# Patient Record
Sex: FEMALE | Race: WHITE | Employment: FULL TIME | URBAN - NONMETROPOLITAN AREA
[De-identification: names, ages, dates, MRNs, and addresses within clinical notes are randomized per-mention and may not be internally consistent; named-entity substitution may affect disease eponyms.]

---

## 2017-10-03 ENCOUNTER — HOSPITAL ENCOUNTER (OUTPATIENT)
Age: 42
Discharge: HOME | End: 2017-10-03
Payer: COMMERCIAL

## 2017-10-03 LAB
ALBUMIN SERPL-MCNC: 4.3 G/DL (ref 3.5–5.1)
ALT SERPL-CCNC: 15 U/L (ref 11–66)
ANISOCYTOSIS: SLIGHT
BASOPHILS # BLD: 0.3 %
BASOPHILS ABSOLUTE: 0 THOU/MM3 (ref 0–0.1)
CREAT SERPL-MCNC: 0.6 MG/DL (ref 0.4–1.2)
EOSINOPHIL # BLD: 1 %
EOSINOPHILS ABSOLUTE: 0.1 THOU/MM3 (ref 0–0.4)
GFR SERPL CREATININE-BSD FRML MDRD: > 90 ML/MIN/1.73M2
HCT VFR BLD CALC: 38.7 % (ref 37–47)
HEMOGLOBIN: 13.1 GM/DL (ref 12–16)
LYMPHOCYTES # BLD: 27.8 %
LYMPHOCYTES ABSOLUTE: 3.4 THOU/MM3 (ref 1–4.8)
MCH RBC QN AUTO: 29 PG (ref 27–31)
MCHC RBC AUTO-ENTMCNC: 33.9 GM/DL (ref 33–37)
MCV RBC AUTO: 85.4 FL (ref 81–99)
MONOCYTES # BLD: 8.3 %
MONOCYTES ABSOLUTE: 1 THOU/MM3 (ref 0.4–1.3)
NUCLEATED RED BLOOD CELLS: 0 /100 WBC
PDW BLD-RTO: 14.6 % (ref 11.5–14.5)
PLATELET # BLD: 351 THOU/MM3 (ref 130–400)
PMV BLD AUTO: 8.2 MCM (ref 7.4–10.4)
RBC # BLD: 4.53 MILL/MM3 (ref 4.2–5.4)
RBC # BLD: NORMAL 10*6/UL
SEDIMENTATION RATE, ERYTHROCYTE: 15 MM/HR (ref 0–20)
SEG NEUTROPHILS: 62.6 %
SEGMENTED NEUTROPHILS ABSOLUTE COUNT: 7.7 THOU/MM3 (ref 1.8–7.7)
WBC # BLD: 12.3 THOU/MM3 (ref 4.8–10.8)

## 2017-10-03 PROCEDURE — 85025 COMPLETE CBC W/AUTO DIFF WBC: CPT

## 2017-10-03 PROCEDURE — 36415 COLL VENOUS BLD VENIPUNCTURE: CPT

## 2017-10-03 PROCEDURE — 82040 ASSAY OF SERUM ALBUMIN: CPT

## 2017-10-03 PROCEDURE — 84460 ALANINE AMINO (ALT) (SGPT): CPT

## 2017-10-03 PROCEDURE — 82565 ASSAY OF CREATININE: CPT

## 2017-10-03 PROCEDURE — 85651 RBC SED RATE NONAUTOMATED: CPT

## 2018-02-01 ENCOUNTER — HOSPITAL ENCOUNTER (OUTPATIENT)
Age: 43
Discharge: HOME | End: 2018-02-01
Payer: COMMERCIAL

## 2018-02-01 LAB
ALBUMIN SERPL-MCNC: 4.4 G/DL (ref 3.5–5.1)
ALT SERPL-CCNC: 16 U/L (ref 11–66)
ANISOCYTOSIS: ABNORMAL
BASOPHILS # BLD: 0.5 %
BASOPHILS ABSOLUTE: 0 THOU/MM3 (ref 0–0.1)
CREAT SERPL-MCNC: 0.5 MG/DL (ref 0.4–1.2)
EOSINOPHIL # BLD: 1.5 %
EOSINOPHILS ABSOLUTE: 0.1 THOU/MM3 (ref 0–0.4)
GFR SERPL CREATININE-BSD FRML MDRD: > 90 ML/MIN/1.73M2
HCT VFR BLD CALC: 39.1 % (ref 37–47)
HEMOGLOBIN: 13.1 GM/DL (ref 12–16)
LYMPHOCYTES # BLD: 37.9 %
LYMPHOCYTES ABSOLUTE: 3.7 THOU/MM3 (ref 1–4.8)
MCH RBC QN AUTO: 28.6 PG (ref 27–31)
MCHC RBC AUTO-ENTMCNC: 33.6 GM/DL (ref 33–37)
MCV RBC AUTO: 85.3 FL (ref 81–99)
MONOCYTES # BLD: 7 %
MONOCYTES ABSOLUTE: 0.7 THOU/MM3 (ref 0.4–1.3)
NUCLEATED RED BLOOD CELLS: 0 /100 WBC
PDW BLD-RTO: 14.9 % (ref 11.5–14.5)
PLATELET # BLD: 350 THOU/MM3 (ref 130–400)
PMV BLD AUTO: 8.3 FL (ref 7.4–10.4)
RBC # BLD: 4.59 MILL/MM3 (ref 4.2–5.4)
SEDIMENTATION RATE, ERYTHROCYTE: 16 MM/HR (ref 0–20)
SEG NEUTROPHILS: 53.1 %
SEGMENTED NEUTROPHILS ABSOLUTE COUNT: 5.2 THOU/MM3 (ref 1.8–7.7)
WBC # BLD: 9.8 THOU/MM3 (ref 4.8–10.8)

## 2018-02-01 PROCEDURE — 36415 COLL VENOUS BLD VENIPUNCTURE: CPT

## 2018-02-01 PROCEDURE — 85025 COMPLETE CBC W/AUTO DIFF WBC: CPT

## 2018-02-01 PROCEDURE — 84460 ALANINE AMINO (ALT) (SGPT): CPT

## 2018-02-01 PROCEDURE — 85651 RBC SED RATE NONAUTOMATED: CPT

## 2018-02-01 PROCEDURE — 82040 ASSAY OF SERUM ALBUMIN: CPT

## 2018-02-01 PROCEDURE — 82565 ASSAY OF CREATININE: CPT

## 2018-04-23 ENCOUNTER — HOSPITAL ENCOUNTER (OUTPATIENT)
Age: 43
Discharge: HOME | End: 2018-04-23
Payer: COMMERCIAL

## 2018-04-23 LAB
ALBUMIN SERPL-MCNC: 4.4 G/DL (ref 3.5–5.1)
ALT SERPL-CCNC: 18 U/L (ref 11–66)
ANISOCYTOSIS: ABNORMAL
BASOPHILS # BLD: 0.3 %
BASOPHILS ABSOLUTE: 0 THOU/MM3 (ref 0–0.1)
CREAT SERPL-MCNC: 0.5 MG/DL (ref 0.4–1.2)
EOSINOPHIL # BLD: 1.2 %
EOSINOPHILS ABSOLUTE: 0.1 THOU/MM3 (ref 0–0.4)
GFR SERPL CREATININE-BSD FRML MDRD: > 90 ML/MIN/1.73M2
HCT VFR BLD CALC: 37.8 % (ref 37–47)
HEMOGLOBIN: 13.1 GM/DL (ref 12–16)
LYMPHOCYTES # BLD: 36 %
LYMPHOCYTES ABSOLUTE: 3.8 THOU/MM3 (ref 1–4.8)
MCH RBC QN AUTO: 29.4 PG (ref 27–31)
MCHC RBC AUTO-ENTMCNC: 34.7 GM/DL (ref 33–37)
MCV RBC AUTO: 84.7 FL (ref 81–99)
MONOCYTES # BLD: 7.7 %
MONOCYTES ABSOLUTE: 0.8 THOU/MM3 (ref 0.4–1.3)
NUCLEATED RED BLOOD CELLS: 0 /100 WBC
PDW BLD-RTO: 15 % (ref 11.5–14.5)
PLATELET # BLD: 380 THOU/MM3 (ref 130–400)
PMV BLD AUTO: 7.7 FL (ref 7.4–10.4)
RBC # BLD: 4.47 MILL/MM3 (ref 4.2–5.4)
SEDIMENTATION RATE, ERYTHROCYTE: 14 MM/HR (ref 0–20)
SEG NEUTROPHILS: 54.8 %
SEGMENTED NEUTROPHILS ABSOLUTE COUNT: 5.8 THOU/MM3 (ref 1.8–7.7)
WBC # BLD: 10.5 THOU/MM3 (ref 4.8–10.8)

## 2018-04-23 PROCEDURE — 36415 COLL VENOUS BLD VENIPUNCTURE: CPT

## 2018-04-23 PROCEDURE — 82040 ASSAY OF SERUM ALBUMIN: CPT

## 2018-04-23 PROCEDURE — 85025 COMPLETE CBC W/AUTO DIFF WBC: CPT

## 2018-04-23 PROCEDURE — 85651 RBC SED RATE NONAUTOMATED: CPT

## 2018-04-23 PROCEDURE — 82565 ASSAY OF CREATININE: CPT

## 2018-04-23 PROCEDURE — 84460 ALANINE AMINO (ALT) (SGPT): CPT

## 2018-06-06 ENCOUNTER — HOSPITAL ENCOUNTER (OUTPATIENT)
Age: 43
Discharge: HOME | End: 2018-06-06
Payer: COMMERCIAL

## 2018-06-06 LAB
HBV SURFACE AB TITR SER: POSITIVE {TITER}
HEPATITIS B SURFACE ANTIGEN: NEGATIVE
HEPATITIS C ANTIBODY: NEGATIVE

## 2018-06-06 PROCEDURE — 86803 HEPATITIS C AB TEST: CPT

## 2018-06-06 PROCEDURE — 87340 HEPATITIS B SURFACE AG IA: CPT

## 2018-06-06 PROCEDURE — 86706 HEP B SURFACE ANTIBODY: CPT

## 2018-06-06 PROCEDURE — 86480 TB TEST CELL IMMUN MEASURE: CPT

## 2018-06-06 PROCEDURE — 86704 HEP B CORE ANTIBODY TOTAL: CPT

## 2018-06-06 PROCEDURE — 36415 COLL VENOUS BLD VENIPUNCTURE: CPT

## 2018-06-09 LAB
HEPATITIS B CORE TOTAL ANTIBODY: NEGATIVE
QUANTIFERON (R) TB GOLD (INCUBATED): NEGATIVE
QUANTIFERON MITOGEN MINUS NIL: > 10 IU/ML
QUANTIFERON NIL: 0.03 IU/ML
QUANTIFERON TB AG MINUS NIL: 0 IU/ML (ref 0–0.34)

## 2018-07-09 ENCOUNTER — OFFICE VISIT (OUTPATIENT)
Dept: PULMONOLOGY | Age: 43
End: 2018-07-09
Payer: COMMERCIAL

## 2018-07-09 VITALS
BODY MASS INDEX: 53.92 KG/M2 | WEIGHT: 293 LBS | RESPIRATION RATE: 20 BRPM | DIASTOLIC BLOOD PRESSURE: 82 MMHG | SYSTOLIC BLOOD PRESSURE: 122 MMHG | HEART RATE: 71 BPM | HEIGHT: 62 IN | OXYGEN SATURATION: 95 %

## 2018-07-09 DIAGNOSIS — Z99.89 OBSTRUCTIVE SLEEP APNEA ON CPAP: Primary | ICD-10-CM

## 2018-07-09 DIAGNOSIS — G47.33 OBSTRUCTIVE SLEEP APNEA ON CPAP: Primary | ICD-10-CM

## 2018-07-09 PROCEDURE — 99213 OFFICE O/P EST LOW 20 MIN: CPT | Performed by: PHYSICIAN ASSISTANT

## 2018-07-09 PROCEDURE — 1036F TOBACCO NON-USER: CPT | Performed by: PHYSICIAN ASSISTANT

## 2018-07-09 PROCEDURE — G8419 CALC BMI OUT NRM PARAM NOF/U: HCPCS | Performed by: PHYSICIAN ASSISTANT

## 2018-07-09 PROCEDURE — G8427 DOCREV CUR MEDS BY ELIG CLIN: HCPCS | Performed by: PHYSICIAN ASSISTANT

## 2018-07-09 ASSESSMENT — ENCOUNTER SYMPTOMS
SHORTNESS OF BREATH: 0
ORTHOPNEA: 0
SINUS PAIN: 0
SORE THROAT: 0
NAUSEA: 0
RESPIRATORY NEGATIVE: 1
HEARTBURN: 0
WHEEZING: 0
EYES NEGATIVE: 1
COUGH: 0
SPUTUM PRODUCTION: 0
GASTROINTESTINAL NEGATIVE: 1

## 2018-07-09 NOTE — PROGRESS NOTES
25 mg by mouth every 6 hours as needed for Itching      Cetirizine HCl (ZYRTEC ALLERGY PO) Take by mouth      loratadine (CLARITIN) 10 MG tablet Take 10 mg by mouth daily.  Cholecalciferol (VITAMIN D) 2000 UNITS CAPS capsule Take  by mouth.  Ascorbic Acid (VITAMIN C) 500 MG CAPS Take  by mouth.  Omega-3 Fatty Acids (OMEGA 3 PO) Take  by mouth 2 times daily.  Multiple Vitamins-Minerals (MULTIVITAL-M PO) Take  by mouth.  acetaminophen (TYLENOL) 325 MG tablet Take 650 mg by mouth every 6 hours as needed.  lisinopril-hydrochlorothiazide (PRINZIDE;ZESTORETIC) 10-12.5 MG per tablet Take 1 tablet by mouth daily.  folic acid (FOLVITE) 1 MG tablet Take 1 mg by mouth daily.  methotrexate, PF, (RHEUMATREX) 25 MG/ML chemo injection Inject 15 mg into the muscle once a week.  metformin (GLUCOPHAGE-XR) 750 MG XR tablet Take 500 mg by mouth daily (with breakfast)       sertraline (ZOLOFT) 100 MG tablet Take 100 mg by mouth daily.  InFLIXimab (REMICADE IV) Infuse 4 vials intravenously. Every 6 weeks       No current facility-administered medications for this visit. Family History   Problem Relation Age of Onset   Alessio Martinez Cancer Mother     Arthritis Father         Review of Systems -   Review of Systems   Constitutional: Negative. Negative for chills and fever. HENT: Negative. Negative for congestion, nosebleeds, sinus pain and sore throat. Eyes: Negative. Respiratory: Negative. Negative for cough, sputum production, shortness of breath and wheezing. Cardiovascular: Negative. Negative for chest pain, orthopnea and PND. Gastrointestinal: Negative. Negative for heartburn and nausea. Genitourinary: Negative. Musculoskeletal: Negative. Negative for joint pain and myalgias. Skin: Negative. Neurological: Negative. Negative for dizziness, weakness and headaches. Endo/Heme/Allergies: Negative. Psychiatric/Behavioral: Negative. earlier appointment if needed for worsening of sleep symptoms.   - She was instructed on weight loss  - Mathieuminesh Jamblayne was educated about my impression and plan. Patient verbalizes understanding.   We will see Angel Terrell back in: 1 year with download    Mamta Vance PA-C, MPAS  7/9/2018

## 2018-07-25 ENCOUNTER — HOSPITAL ENCOUNTER (OUTPATIENT)
Age: 43
Discharge: HOME | End: 2018-07-25
Payer: COMMERCIAL

## 2018-07-25 LAB
ALBUMIN SERPL-MCNC: 4 G/DL (ref 3.5–5.1)
ALT SERPL-CCNC: 15 U/L (ref 11–66)
BASOPHILS # BLD: 0.3 %
BASOPHILS ABSOLUTE: 0 THOU/MM3 (ref 0–0.1)
CREAT SERPL-MCNC: 0.7 MG/DL (ref 0.4–1.2)
EOSINOPHIL # BLD: 1.5 %
EOSINOPHILS ABSOLUTE: 0.1 THOU/MM3 (ref 0–0.4)
ERYTHROCYTE [DISTWIDTH] IN BLOOD BY AUTOMATED COUNT: 13.7 % (ref 11.5–14.5)
ERYTHROCYTE [DISTWIDTH] IN BLOOD BY AUTOMATED COUNT: 43.8 FL (ref 35–45)
GFR SERPL CREATININE-BSD FRML MDRD: > 90 ML/MIN/1.73M2
HCT VFR BLD CALC: 38.7 % (ref 37–47)
HEMOGLOBIN: 13 GM/DL (ref 12–16)
IMMATURE GRANS (ABS): 0.02 THOU/MM3 (ref 0–0.07)
IMMATURE GRANULOCYTES: 0.2 %
LYMPHOCYTES # BLD: 35.7 %
LYMPHOCYTES ABSOLUTE: 3.5 THOU/MM3 (ref 1–4.8)
MCH RBC QN AUTO: 29.5 PG (ref 26–33)
MCHC RBC AUTO-ENTMCNC: 33.6 GM/DL (ref 32.2–35.5)
MCV RBC AUTO: 88 FL (ref 81–99)
MONOCYTES # BLD: 5.6 %
MONOCYTES ABSOLUTE: 0.5 THOU/MM3 (ref 0.4–1.3)
NUCLEATED RED BLOOD CELLS: 0 /100 WBC
PLATELET # BLD: 312 THOU/MM3 (ref 130–400)
PMV BLD AUTO: 9.8 FL (ref 9.4–12.4)
RBC # BLD: 4.4 MILL/MM3 (ref 4.2–5.4)
SEDIMENTATION RATE, ERYTHROCYTE: 17 MM/HR (ref 0–20)
SEG NEUTROPHILS: 56.7 %
SEGMENTED NEUTROPHILS ABSOLUTE COUNT: 5.5 THOU/MM3 (ref 1.8–7.7)
WBC # BLD: 9.7 THOU/MM3 (ref 4.8–10.8)

## 2018-07-25 PROCEDURE — 36415 COLL VENOUS BLD VENIPUNCTURE: CPT

## 2018-07-25 PROCEDURE — 84460 ALANINE AMINO (ALT) (SGPT): CPT

## 2018-07-25 PROCEDURE — 85651 RBC SED RATE NONAUTOMATED: CPT

## 2018-07-25 PROCEDURE — 82565 ASSAY OF CREATININE: CPT

## 2018-07-25 PROCEDURE — 85025 COMPLETE CBC W/AUTO DIFF WBC: CPT

## 2018-07-25 PROCEDURE — 82040 ASSAY OF SERUM ALBUMIN: CPT

## 2018-09-27 ENCOUNTER — HOSPITAL ENCOUNTER (OUTPATIENT)
Age: 43
Discharge: HOME | End: 2018-09-27
Payer: COMMERCIAL

## 2018-09-27 LAB
ALBUMIN SERPL-MCNC: 4.4 G/DL (ref 3.5–5.1)
ALT SERPL-CCNC: 17 U/L (ref 11–66)
BASOPHILS # BLD: 0.3 %
BASOPHILS ABSOLUTE: 0 THOU/MM3 (ref 0–0.1)
CREAT SERPL-MCNC: 0.5 MG/DL (ref 0.4–1.2)
EOSINOPHIL # BLD: 1.1 %
EOSINOPHILS ABSOLUTE: 0.1 THOU/MM3 (ref 0–0.4)
ERYTHROCYTE [DISTWIDTH] IN BLOOD BY AUTOMATED COUNT: 13.4 % (ref 11.5–14.5)
ERYTHROCYTE [DISTWIDTH] IN BLOOD BY AUTOMATED COUNT: 41.3 FL (ref 35–45)
GFR SERPL CREATININE-BSD FRML MDRD: > 90 ML/MIN/1.73M2
HCT VFR BLD CALC: 38.5 % (ref 37–47)
HEMOGLOBIN: 13.3 GM/DL (ref 12–16)
IMMATURE GRANS (ABS): 0.04 THOU/MM3 (ref 0–0.07)
IMMATURE GRANULOCYTES: 0.3 %
LYMPHOCYTES # BLD: 28.3 %
LYMPHOCYTES ABSOLUTE: 3.4 THOU/MM3 (ref 1–4.8)
MCH RBC QN AUTO: 29.6 PG (ref 26–33)
MCHC RBC AUTO-ENTMCNC: 34.5 GM/DL (ref 32.2–35.5)
MCV RBC AUTO: 85.6 FL (ref 81–99)
MONOCYTES # BLD: 6.4 %
MONOCYTES ABSOLUTE: 0.8 THOU/MM3 (ref 0.4–1.3)
NUCLEATED RED BLOOD CELLS: 0 /100 WBC
PLATELET # BLD: 372 THOU/MM3 (ref 130–400)
PMV BLD AUTO: 9.2 FL (ref 9.4–12.4)
RBC # BLD: 4.5 MILL/MM3 (ref 4.2–5.4)
SEDIMENTATION RATE, ERYTHROCYTE: 20 MM/HR (ref 0–20)
SEG NEUTROPHILS: 63.6 %
SEGMENTED NEUTROPHILS ABSOLUTE COUNT: 7.6 THOU/MM3 (ref 1.8–7.7)
WBC # BLD: 12 THOU/MM3 (ref 4.8–10.8)

## 2018-09-27 PROCEDURE — 85651 RBC SED RATE NONAUTOMATED: CPT

## 2018-09-27 PROCEDURE — 85025 COMPLETE CBC W/AUTO DIFF WBC: CPT

## 2018-09-27 PROCEDURE — 36415 COLL VENOUS BLD VENIPUNCTURE: CPT

## 2018-09-27 PROCEDURE — 82040 ASSAY OF SERUM ALBUMIN: CPT

## 2018-09-27 PROCEDURE — 82565 ASSAY OF CREATININE: CPT

## 2018-09-27 PROCEDURE — 84460 ALANINE AMINO (ALT) (SGPT): CPT

## 2018-11-28 ENCOUNTER — OFFICE VISIT (OUTPATIENT)
Dept: FAMILY MEDICINE CLINIC | Age: 43
End: 2018-11-28
Payer: COMMERCIAL

## 2018-11-28 VITALS
HEART RATE: 84 BPM | RESPIRATION RATE: 14 BRPM | DIASTOLIC BLOOD PRESSURE: 64 MMHG | WEIGHT: 293 LBS | BODY MASS INDEX: 44.41 KG/M2 | HEIGHT: 68 IN | OXYGEN SATURATION: 98 % | SYSTOLIC BLOOD PRESSURE: 118 MMHG | TEMPERATURE: 98.2 F

## 2018-11-28 DIAGNOSIS — F32.89 OTHER DEPRESSION: ICD-10-CM

## 2018-11-28 DIAGNOSIS — Z99.89 OBSTRUCTIVE SLEEP APNEA ON CPAP: Primary | ICD-10-CM

## 2018-11-28 DIAGNOSIS — E88.81 INSULIN RESISTANCE: ICD-10-CM

## 2018-11-28 DIAGNOSIS — E66.01 MORBID OBESITY WITH BODY MASS INDEX OF 40.0-44.9 IN ADULT (HCC): ICD-10-CM

## 2018-11-28 DIAGNOSIS — I10 HYPERTENSION, UNSPECIFIED TYPE: ICD-10-CM

## 2018-11-28 DIAGNOSIS — G47.33 OBSTRUCTIVE SLEEP APNEA ON CPAP: Primary | ICD-10-CM

## 2018-11-28 DIAGNOSIS — E28.2 PCOS (POLYCYSTIC OVARIAN SYNDROME): ICD-10-CM

## 2018-11-28 PROCEDURE — G8484 FLU IMMUNIZE NO ADMIN: HCPCS | Performed by: NURSE PRACTITIONER

## 2018-11-28 PROCEDURE — G8427 DOCREV CUR MEDS BY ELIG CLIN: HCPCS | Performed by: NURSE PRACTITIONER

## 2018-11-28 PROCEDURE — G8417 CALC BMI ABV UP PARAM F/U: HCPCS | Performed by: NURSE PRACTITIONER

## 2018-11-28 PROCEDURE — 1036F TOBACCO NON-USER: CPT | Performed by: NURSE PRACTITIONER

## 2018-11-28 PROCEDURE — 99203 OFFICE O/P NEW LOW 30 MIN: CPT | Performed by: NURSE PRACTITIONER

## 2018-11-28 RX ORDER — MELOXICAM 15 MG/1
15 TABLET ORAL DAILY
COMMUNITY
End: 2019-05-28 | Stop reason: ALTCHOICE

## 2018-11-28 RX ORDER — METFORMIN HYDROCHLORIDE 500 MG/1
500 TABLET, EXTENDED RELEASE ORAL
Qty: 90 TABLET | Refills: 3 | Status: SHIPPED | OUTPATIENT
Start: 2018-11-28 | End: 2019-11-26 | Stop reason: SDUPTHER

## 2018-11-28 RX ORDER — LORATADINE 10 MG/1
10 TABLET ORAL DAILY
Qty: 90 TABLET | Refills: 3 | Status: SHIPPED | OUTPATIENT
Start: 2018-11-28 | End: 2019-11-26 | Stop reason: SDUPTHER

## 2018-11-28 RX ORDER — SERTRALINE HYDROCHLORIDE 100 MG/1
100 TABLET, FILM COATED ORAL DAILY
Qty: 90 TABLET | Refills: 3 | Status: SHIPPED | OUTPATIENT
Start: 2018-11-28 | End: 2019-11-26 | Stop reason: SDUPTHER

## 2018-11-28 RX ORDER — LISINOPRIL AND HYDROCHLOROTHIAZIDE 12.5; 1 MG/1; MG/1
1 TABLET ORAL DAILY
Qty: 90 TABLET | Refills: 3 | Status: SHIPPED | OUTPATIENT
Start: 2018-11-28 | End: 2019-11-26 | Stop reason: SDUPTHER

## 2018-11-28 RX ORDER — MELATONIN
1 DAILY
COMMUNITY

## 2018-11-28 RX ORDER — METFORMIN HYDROCHLORIDE 500 MG/1
500 TABLET, EXTENDED RELEASE ORAL
COMMUNITY
End: 2018-11-28 | Stop reason: SDUPTHER

## 2018-11-28 ASSESSMENT — PATIENT HEALTH QUESTIONNAIRE - PHQ9
SUM OF ALL RESPONSES TO PHQ QUESTIONS 1-9: 0
SUM OF ALL RESPONSES TO PHQ9 QUESTIONS 1 & 2: 0
1. LITTLE INTEREST OR PLEASURE IN DOING THINGS: 0
SUM OF ALL RESPONSES TO PHQ QUESTIONS 1-9: 0
2. FEELING DOWN, DEPRESSED OR HOPELESS: 0

## 2018-11-28 ASSESSMENT — ENCOUNTER SYMPTOMS
SINUS PRESSURE: 0
DIARRHEA: 0
BLOOD IN STOOL: 0
SHORTNESS OF BREATH: 0
RHINORRHEA: 0
WHEEZING: 0
EYE ITCHING: 0
VOMITING: 0
EYE REDNESS: 0
CONSTIPATION: 0
COUGH: 1
NAUSEA: 0
EYE PAIN: 0
SINUS PAIN: 0

## 2018-11-28 NOTE — PROGRESS NOTES
prescribed medications. All patient questions answered. Pt voiced understanding. Reviewed health maintenance.        Electronically signed THU Castro - CNP on 11/28/2018 at 5:08 PM

## 2018-11-28 NOTE — PATIENT INSTRUCTIONS
Take part in a Mosque activity or other social gathering. Go to a [employer] game. · Ask a friend to have dinner with you. Take care of yourself  · Eat a balanced diet with plenty of fresh fruits and vegetables, whole grains, and lean protein. If you have lost your appetite, eat small snacks rather than large meals. · Avoid drinking alcohol or using illegal drugs. Do not take medicines that have not been prescribed for you. They may interfere with medicines you may be taking for depression, or they may make your depression worse. · Take your medicines exactly as they are prescribed. You may start to feel better within 1 to 3 weeks of taking antidepressant medicine. But it can take as many as 6 to 8 weeks to see more improvement. If you have questions or concerns about your medicines, or if you do not notice any improvement by 3 weeks, talk to your doctor. · If you have any side effects from your medicine, tell your doctor. Antidepressants can make you feel tired, dizzy, or nervous. Some people have dry mouth, constipation, headaches, sexual problems, or diarrhea. Many of these side effects are mild and will go away on their own after you have been taking the medicine for a few weeks. Some may last longer. Talk to your doctor if side effects are bothering you too much. You might be able to try a different medicine. · Get enough sleep. If you have problems sleeping:  ? Go to bed at the same time every night, and get up at the same time every morning. ? Keep your bedroom dark and quiet. ? Do not exercise after 5:00 p.m.  ? Avoid drinks with caffeine after 5:00 p.m. · Avoid sleeping pills unless they are prescribed by the doctor treating your depression. Sleeping pills may make you groggy during the day, and they may interact with other medicine you are taking. · If you have any other illnesses, such as diabetes, heart disease, or high blood pressure, make sure to continue with your treatment.  Tell your doctor about all of the medicines you take, including those with or without a prescription. · Keep the numbers for these national suicide hotlines: 2-340-174-TALK (8-415.652.6058) and 8-670-XNEXHPH (2-195.561.7556). If you or someone you know talks about suicide or feeling hopeless, get help right away. When should you call for help? Call 911 anytime you think you may need emergency care. For example, call if:    · You feel like hurting yourself or someone else.     · Someone you know has depression and is about to attempt or is attempting suicide.   [facility] your doctor now or seek immediate medical care if:    · You hear voices.     · Someone you know has depression and:  ? Starts to give away his or her possessions. ? Uses illegal drugs or drinks alcohol heavily. ? Talks or writes about death, including writing suicide notes or talking about guns, knives, or pills. ? Starts to spend a lot of time alone. ? Acts very aggressively or suddenly appears calm.    Watch closely for changes in your health, and be sure to contact your doctor if:    · You do not get better as expected. Where can you learn more? Go to https://Brisk.io.Loginza. org and sign in to your [employer] account. Enter I021 in the [facility] box to learn more about \"Recovering From Depression: Care Instructions. \"     If you do not have an account, please click on the \"Sign Up Now\" link. Current as of: December 7, 2017  Content Version: 11.8  © 1591-4881 Healthwise, [employer]. Care instructions adapted under license by [facility] [place] ([facility]). If you have questions about a medical condition or this instruction, always ask your healthcare professional. [place] [address] any warranty or liability for your use of this information. Patient Education        DASH Diet: Care Instructions  Your Care Instructions    The DASH diet is an eating plan that can help lower your blood pressure.  DASH stands for Dietary Approaches to medicines. Take your medicine exactly as prescribed. Call your doctor if you think you are having a problem with your medicine. · Talk to your doctor before you start taking aspirin every day. Aspirin can help certain people lower their risk of a heart attack or stroke. But taking aspirin isn't right for everyone, because it can cause serious bleeding. · See your doctor regularly. You may need to see the doctor more often at first or until your blood pressure comes down. · If you are taking blood pressure medicine, talk to your doctor before you take decongestants or anti-inflammatory medicine, such as ibuprofen. Some of these medicines can raise blood pressure. · Learn how to check your blood pressure at home. Lifestyle changes  · Stay at a healthy weight. This is especially important if you put on weight around the waist. Losing even 10 pounds can help you lower your blood pressure. · If your doctor recommends it, get more exercise. Walking is a good choice. Bit by bit, increase the amount you walk every day. Try for at least 30 minutes on most days of the week. You also may want to swim, bike, or do other activities. · Avoid or limit alcohol. Talk to your doctor about whether you can drink any alcohol. · Try to limit how much sodium you eat to less than 2,300 milligrams (mg) a day. Your doctor may ask you to try to eat less than 1,500 mg a day. · Eat plenty of fruits (such as bananas and oranges), vegetables, legumes, whole grains, and low-fat dairy products. · Lower the amount of saturated fat in your diet. Saturated fat is found in animal products such as milk, cheese, and meat. Limiting these foods may help you lose weight and also lower your risk for heart disease. · Do not smoke. Smoking increases your risk for heart attack and stroke. If you need help quitting, talk to your doctor about stop-smoking programs and medicines. These can increase your chances of quitting for good.   When should you

## 2018-12-18 ENCOUNTER — HOSPITAL ENCOUNTER (OUTPATIENT)
Age: 43
Discharge: HOME | End: 2018-12-18
Payer: COMMERCIAL

## 2018-12-18 DIAGNOSIS — Z99.89 OBSTRUCTIVE SLEEP APNEA ON CPAP: ICD-10-CM

## 2018-12-18 DIAGNOSIS — E66.01 MORBID OBESITY WITH BODY MASS INDEX OF 40.0-44.9 IN ADULT (HCC): ICD-10-CM

## 2018-12-18 DIAGNOSIS — G47.33 OBSTRUCTIVE SLEEP APNEA ON CPAP: ICD-10-CM

## 2018-12-18 DIAGNOSIS — I10 HYPERTENSION, UNSPECIFIED TYPE: ICD-10-CM

## 2018-12-18 LAB
ALBUMIN SERPL-MCNC: 4 G/DL (ref 3.5–5.1)
ALP BLD-CCNC: 74 U/L (ref 38–126)
ALT SERPL-CCNC: 15 U/L (ref 11–66)
ANION GAP SERPL CALCULATED.3IONS-SCNC: 13 MEQ/L (ref 8–16)
AST SERPL-CCNC: 14 U/L (ref 5–40)
BASOPHILS # BLD: 0.3 %
BASOPHILS ABSOLUTE: 0 THOU/MM3 (ref 0–0.1)
BILIRUB SERPL-MCNC: 0.2 MG/DL (ref 0.3–1.2)
BUN BLDV-MCNC: 15 MG/DL (ref 7–22)
CALCIUM SERPL-MCNC: 9.2 MG/DL (ref 8.5–10.5)
CHLORIDE BLD-SCNC: 97 MEQ/L (ref 98–111)
CHOLESTEROL, TOTAL: 150 MG/DL (ref 100–199)
CO2: 26 MEQ/L (ref 23–33)
CREAT SERPL-MCNC: 0.6 MG/DL (ref 0.4–1.2)
EOSINOPHIL # BLD: 1.2 %
EOSINOPHILS ABSOLUTE: 0.1 THOU/MM3 (ref 0–0.4)
ERYTHROCYTE [DISTWIDTH] IN BLOOD BY AUTOMATED COUNT: 14.3 % (ref 11.5–14.5)
ERYTHROCYTE [DISTWIDTH] IN BLOOD BY AUTOMATED COUNT: 44.3 FL (ref 35–45)
GFR SERPL CREATININE-BSD FRML MDRD: > 90 ML/MIN/1.73M2
GLUCOSE BLD-MCNC: 122 MG/DL (ref 70–108)
HCT VFR BLD CALC: 38.5 % (ref 37–47)
HDLC SERPL-MCNC: 32 MG/DL
HEMOGLOBIN: 12.8 GM/DL (ref 12–16)
IMMATURE GRANS (ABS): 0.04 THOU/MM3 (ref 0–0.07)
IMMATURE GRANULOCYTES: 0.3 %
LDL CHOLESTEROL CALCULATED: 67 MG/DL
LYMPHOCYTES # BLD: 25.1 %
LYMPHOCYTES ABSOLUTE: 2.9 THOU/MM3 (ref 1–4.8)
MCH RBC QN AUTO: 28.8 PG (ref 26–33)
MCHC RBC AUTO-ENTMCNC: 33.2 GM/DL (ref 32.2–35.5)
MCV RBC AUTO: 86.7 FL (ref 81–99)
MONOCYTES # BLD: 7.6 %
MONOCYTES ABSOLUTE: 0.9 THOU/MM3 (ref 0.4–1.3)
NUCLEATED RED BLOOD CELLS: 0 /100 WBC
PLATELET # BLD: 359 THOU/MM3 (ref 130–400)
PMV BLD AUTO: 10 FL (ref 9.4–12.4)
POTASSIUM SERPL-SCNC: 3.9 MEQ/L (ref 3.5–5.2)
RBC # BLD: 4.44 MILL/MM3 (ref 4.2–5.4)
SEDIMENTATION RATE, ERYTHROCYTE: 20 MM/HR (ref 0–20)
SEG NEUTROPHILS: 65.5 %
SEGMENTED NEUTROPHILS ABSOLUTE COUNT: 7.6 THOU/MM3 (ref 1.8–7.7)
SODIUM BLD-SCNC: 136 MEQ/L (ref 135–145)
TOTAL PROTEIN: 7.4 G/DL (ref 6.1–8)
TRIGL SERPL-MCNC: 257 MG/DL (ref 0–199)
WBC # BLD: 11.6 THOU/MM3 (ref 4.8–10.8)

## 2018-12-18 PROCEDURE — 85025 COMPLETE CBC W/AUTO DIFF WBC: CPT

## 2018-12-18 PROCEDURE — 36415 COLL VENOUS BLD VENIPUNCTURE: CPT

## 2018-12-18 PROCEDURE — 80053 COMPREHEN METABOLIC PANEL: CPT

## 2018-12-18 PROCEDURE — 80061 LIPID PANEL: CPT

## 2018-12-18 PROCEDURE — 85651 RBC SED RATE NONAUTOMATED: CPT

## 2018-12-19 ENCOUNTER — TELEPHONE (OUTPATIENT)
Dept: FAMILY MEDICINE CLINIC | Age: 43
End: 2018-12-19

## 2018-12-19 NOTE — TELEPHONE ENCOUNTER
----- Message from THU Sapp CNP sent at 12/19/2018 11:20 AM EST -----  Labs are mostly within normal limits. Blood sugar remains elevated at 122 on the metformin. And Triglycerides are elevated at 297; would like this less than 150. Would like pt to really watch diet carb, sugar and simple sweet intake over the next 3-4 months and then recheck. If levels are still high would consider changing her medication. Kidney and liver function within normal. No other changes at this time.

## 2018-12-19 NOTE — TELEPHONE ENCOUNTER
I called the patient and received voicemail. I left a detailed message regarding Gina's response and if she had any questions to give our office a call.

## 2018-12-28 ENCOUNTER — OFFICE VISIT (OUTPATIENT)
Dept: FAMILY MEDICINE CLINIC | Age: 43
End: 2018-12-28
Payer: COMMERCIAL

## 2018-12-28 VITALS
TEMPERATURE: 98.5 F | DIASTOLIC BLOOD PRESSURE: 72 MMHG | RESPIRATION RATE: 28 BRPM | BODY MASS INDEX: 49.14 KG/M2 | OXYGEN SATURATION: 96 % | SYSTOLIC BLOOD PRESSURE: 130 MMHG | WEIGHT: 293 LBS | HEART RATE: 79 BPM

## 2018-12-28 DIAGNOSIS — J01.40 ACUTE NON-RECURRENT PANSINUSITIS: ICD-10-CM

## 2018-12-28 DIAGNOSIS — J20.9 ACUTE BRONCHITIS, UNSPECIFIED ORGANISM: Primary | ICD-10-CM

## 2018-12-28 DIAGNOSIS — B37.31 VAGINA, CANDIDIASIS: ICD-10-CM

## 2018-12-28 PROCEDURE — G8417 CALC BMI ABV UP PARAM F/U: HCPCS | Performed by: FAMILY MEDICINE

## 2018-12-28 PROCEDURE — 1036F TOBACCO NON-USER: CPT | Performed by: FAMILY MEDICINE

## 2018-12-28 PROCEDURE — G8427 DOCREV CUR MEDS BY ELIG CLIN: HCPCS | Performed by: FAMILY MEDICINE

## 2018-12-28 PROCEDURE — 99213 OFFICE O/P EST LOW 20 MIN: CPT | Performed by: FAMILY MEDICINE

## 2018-12-28 PROCEDURE — G8484 FLU IMMUNIZE NO ADMIN: HCPCS | Performed by: FAMILY MEDICINE

## 2018-12-28 RX ORDER — FLUCONAZOLE 150 MG/1
TABLET ORAL
Qty: 2 TABLET | Refills: 0 | Status: SHIPPED | OUTPATIENT
Start: 2018-12-28 | End: 2018-12-29

## 2018-12-28 RX ORDER — DOXYCYCLINE HYCLATE 100 MG
100 TABLET ORAL 2 TIMES DAILY
Qty: 20 TABLET | Refills: 0 | Status: SHIPPED | OUTPATIENT
Start: 2018-12-28 | End: 2019-01-07

## 2018-12-28 ASSESSMENT — ENCOUNTER SYMPTOMS
EYE PAIN: 0
RHINORRHEA: 1
ABDOMINAL PAIN: 0
BLOOD IN STOOL: 0
NAUSEA: 0
WHEEZING: 0
HEARTBURN: 0
HEMOPTYSIS: 0
COUGH: 1
SORE THROAT: 0
DIARRHEA: 0
CONSTIPATION: 0
SHORTNESS OF BREATH: 0
CHEST TIGHTNESS: 0
BACK PAIN: 0
VOMITING: 0

## 2019-02-08 ENCOUNTER — OFFICE VISIT (OUTPATIENT)
Dept: FAMILY MEDICINE CLINIC | Age: 44
End: 2019-02-08
Payer: COMMERCIAL

## 2019-02-08 VITALS
WEIGHT: 293 LBS | DIASTOLIC BLOOD PRESSURE: 78 MMHG | TEMPERATURE: 98.1 F | OXYGEN SATURATION: 98 % | SYSTOLIC BLOOD PRESSURE: 128 MMHG | BODY MASS INDEX: 46.83 KG/M2 | HEART RATE: 90 BPM

## 2019-02-08 DIAGNOSIS — J40 BRONCHITIS: Primary | ICD-10-CM

## 2019-02-08 DIAGNOSIS — R06.2 WHEEZING: ICD-10-CM

## 2019-02-08 LAB
INFLUENZA A ANTIBODY: NEGATIVE
INFLUENZA B ANTIBODY: NEGATIVE

## 2019-02-08 PROCEDURE — G8484 FLU IMMUNIZE NO ADMIN: HCPCS | Performed by: NURSE PRACTITIONER

## 2019-02-08 PROCEDURE — 99213 OFFICE O/P EST LOW 20 MIN: CPT | Performed by: NURSE PRACTITIONER

## 2019-02-08 PROCEDURE — G8427 DOCREV CUR MEDS BY ELIG CLIN: HCPCS | Performed by: NURSE PRACTITIONER

## 2019-02-08 PROCEDURE — G8417 CALC BMI ABV UP PARAM F/U: HCPCS | Performed by: NURSE PRACTITIONER

## 2019-02-08 PROCEDURE — 87804 INFLUENZA ASSAY W/OPTIC: CPT | Performed by: NURSE PRACTITIONER

## 2019-02-08 PROCEDURE — 1036F TOBACCO NON-USER: CPT | Performed by: NURSE PRACTITIONER

## 2019-02-08 RX ORDER — PREDNISONE 20 MG/1
20 TABLET ORAL 2 TIMES DAILY
Qty: 10 TABLET | Refills: 0 | Status: SHIPPED | OUTPATIENT
Start: 2019-02-08 | End: 2019-02-13

## 2019-02-08 RX ORDER — ALBUTEROL SULFATE 90 UG/1
2 AEROSOL, METERED RESPIRATORY (INHALATION) 4 TIMES DAILY PRN
Qty: 3 INHALER | Refills: 1 | Status: SHIPPED | OUTPATIENT
Start: 2019-02-08 | End: 2019-11-26 | Stop reason: CLARIF

## 2019-02-08 RX ORDER — CEFADROXIL 500 MG/1
500 CAPSULE ORAL 2 TIMES DAILY
Qty: 20 CAPSULE | Refills: 0 | Status: SHIPPED | OUTPATIENT
Start: 2019-02-08 | End: 2019-02-18

## 2019-02-08 ASSESSMENT — ENCOUNTER SYMPTOMS
DIARRHEA: 0
SHORTNESS OF BREATH: 0
CONSTIPATION: 0
CHEST TIGHTNESS: 0
COUGH: 1
FACIAL SWELLING: 0
ABDOMINAL DISTENTION: 0
EYE PAIN: 0
BACK PAIN: 0
NAUSEA: 0
PHOTOPHOBIA: 0
SORE THROAT: 1
APNEA: 0
EYE DISCHARGE: 0

## 2019-02-20 ENCOUNTER — TELEPHONE (OUTPATIENT)
Dept: FAMILY MEDICINE CLINIC | Age: 44
End: 2019-02-20

## 2019-02-20 ENCOUNTER — HOSPITAL ENCOUNTER (OUTPATIENT)
Age: 44
Discharge: HOME | End: 2019-02-20
Payer: COMMERCIAL

## 2019-02-20 ENCOUNTER — HOSPITAL ENCOUNTER (OUTPATIENT)
Dept: GENERAL RADIOLOGY | Age: 44
Discharge: HOME | End: 2019-02-20
Payer: COMMERCIAL

## 2019-02-20 DIAGNOSIS — J40 BRONCHITIS: Primary | ICD-10-CM

## 2019-02-20 DIAGNOSIS — J40 BRONCHITIS: ICD-10-CM

## 2019-02-20 PROCEDURE — 71046 X-RAY EXAM CHEST 2 VIEWS: CPT

## 2019-02-20 RX ORDER — AZITHROMYCIN 250 MG/1
250 TABLET, FILM COATED ORAL SEE ADMIN INSTRUCTIONS
Qty: 6 TABLET | Refills: 0 | Status: SHIPPED | OUTPATIENT
Start: 2019-02-20 | End: 2019-05-28 | Stop reason: SDUPTHER

## 2019-02-20 RX ORDER — METHYLPREDNISOLONE 4 MG/1
TABLET ORAL
Qty: 1 KIT | Refills: 0 | Status: SHIPPED | OUTPATIENT
Start: 2019-02-20 | End: 2019-05-28 | Stop reason: SDUPTHER

## 2019-02-20 RX ORDER — GUAIFENESIN 600 MG/1
600 TABLET, EXTENDED RELEASE ORAL 2 TIMES DAILY
Qty: 30 TABLET | Refills: 0 | Status: SHIPPED | OUTPATIENT
Start: 2019-02-20 | End: 2019-03-07

## 2019-02-28 ENCOUNTER — HOSPITAL ENCOUNTER (OUTPATIENT)
Age: 44
Discharge: HOME | End: 2019-02-28
Payer: COMMERCIAL

## 2019-02-28 LAB
ALBUMIN SERPL-MCNC: 4.3 G/DL (ref 3.5–5.1)
ALT SERPL-CCNC: 19 U/L (ref 11–66)
BASOPHILS # BLD: 0.3 %
BASOPHILS ABSOLUTE: 0 THOU/MM3 (ref 0–0.1)
CREAT SERPL-MCNC: 0.6 MG/DL (ref 0.4–1.2)
EOSINOPHIL # BLD: 1.4 %
EOSINOPHILS ABSOLUTE: 0.2 THOU/MM3 (ref 0–0.4)
ERYTHROCYTE [DISTWIDTH] IN BLOOD BY AUTOMATED COUNT: 14.6 % (ref 11.5–14.5)
ERYTHROCYTE [DISTWIDTH] IN BLOOD BY AUTOMATED COUNT: 45.5 FL (ref 35–45)
GFR SERPL CREATININE-BSD FRML MDRD: > 90 ML/MIN/1.73M2
HCT VFR BLD CALC: 40.2 % (ref 37–47)
HEMOGLOBIN: 13 GM/DL (ref 12–16)
IMMATURE GRANS (ABS): 0.02 THOU/MM3 (ref 0–0.07)
IMMATURE GRANULOCYTES: 0.2 %
LYMPHOCYTES # BLD: 30.9 %
LYMPHOCYTES ABSOLUTE: 3.4 THOU/MM3 (ref 1–4.8)
MCH RBC QN AUTO: 28.1 PG (ref 26–33)
MCHC RBC AUTO-ENTMCNC: 32.3 GM/DL (ref 32.2–35.5)
MCV RBC AUTO: 87 FL (ref 81–99)
MONOCYTES # BLD: 6.3 %
MONOCYTES ABSOLUTE: 0.7 THOU/MM3 (ref 0.4–1.3)
NUCLEATED RED BLOOD CELLS: 0 /100 WBC
PLATELET # BLD: 345 THOU/MM3 (ref 130–400)
PMV BLD AUTO: 9.4 FL (ref 9.4–12.4)
RBC # BLD: 4.62 MILL/MM3 (ref 4.2–5.4)
SEDIMENTATION RATE, ERYTHROCYTE: 15 MM/HR (ref 0–20)
SEG NEUTROPHILS: 60.9 %
SEGMENTED NEUTROPHILS ABSOLUTE COUNT: 6.8 THOU/MM3 (ref 1.8–7.7)
WBC # BLD: 11.1 THOU/MM3 (ref 4.8–10.8)

## 2019-02-28 PROCEDURE — 84460 ALANINE AMINO (ALT) (SGPT): CPT

## 2019-02-28 PROCEDURE — 85025 COMPLETE CBC W/AUTO DIFF WBC: CPT

## 2019-02-28 PROCEDURE — 82040 ASSAY OF SERUM ALBUMIN: CPT

## 2019-02-28 PROCEDURE — 85651 RBC SED RATE NONAUTOMATED: CPT

## 2019-02-28 PROCEDURE — 82565 ASSAY OF CREATININE: CPT

## 2019-02-28 PROCEDURE — 36415 COLL VENOUS BLD VENIPUNCTURE: CPT

## 2019-05-02 ENCOUNTER — NURSE ONLY (OUTPATIENT)
Dept: LAB | Age: 44
End: 2019-05-02

## 2019-05-02 LAB
ALBUMIN SERPL-MCNC: 4.1 G/DL (ref 3.5–5.1)
ALT SERPL-CCNC: 22 U/L (ref 11–66)
BASOPHILS # BLD: 0.3 %
BASOPHILS ABSOLUTE: 0 THOU/MM3 (ref 0–0.1)
CREAT SERPL-MCNC: 0.6 MG/DL (ref 0.4–1.2)
EOSINOPHIL # BLD: 1.3 %
EOSINOPHILS ABSOLUTE: 0.1 THOU/MM3 (ref 0–0.4)
ERYTHROCYTE [DISTWIDTH] IN BLOOD BY AUTOMATED COUNT: 14.2 % (ref 11.5–14.5)
ERYTHROCYTE [DISTWIDTH] IN BLOOD BY AUTOMATED COUNT: 44.2 FL (ref 35–45)
GFR SERPL CREATININE-BSD FRML MDRD: > 90 ML/MIN/1.73M2
HCT VFR BLD CALC: 37.3 % (ref 37–47)
HEMOGLOBIN: 12.3 GM/DL (ref 12–16)
IMMATURE GRANS (ABS): 0.03 THOU/MM3 (ref 0–0.07)
IMMATURE GRANULOCYTES: 0.3 %
LYMPHOCYTES # BLD: 30.8 %
LYMPHOCYTES ABSOLUTE: 3.2 THOU/MM3 (ref 1–4.8)
MCH RBC QN AUTO: 28.6 PG (ref 26–33)
MCHC RBC AUTO-ENTMCNC: 33 GM/DL (ref 32.2–35.5)
MCV RBC AUTO: 86.7 FL (ref 81–99)
MONOCYTES # BLD: 5.9 %
MONOCYTES ABSOLUTE: 0.6 THOU/MM3 (ref 0.4–1.3)
NUCLEATED RED BLOOD CELLS: 0 /100 WBC
PLATELET # BLD: 368 THOU/MM3 (ref 130–400)
PMV BLD AUTO: 9.6 FL (ref 9.4–12.4)
RBC # BLD: 4.3 MILL/MM3 (ref 4.2–5.4)
SEDIMENTATION RATE, ERYTHROCYTE: 17 MM/HR (ref 0–20)
SEG NEUTROPHILS: 61.4 %
SEGMENTED NEUTROPHILS ABSOLUTE COUNT: 6.3 THOU/MM3 (ref 1.8–7.7)
WBC # BLD: 10.3 THOU/MM3 (ref 4.8–10.8)

## 2019-05-15 ENCOUNTER — TELEPHONE (OUTPATIENT)
Dept: FAMILY MEDICINE CLINIC | Age: 44
End: 2019-05-15

## 2019-05-15 DIAGNOSIS — R92.8 ABNORMAL MAMMOGRAM OF RIGHT BREAST: Primary | ICD-10-CM

## 2019-05-15 NOTE — TELEPHONE ENCOUNTER
I called the patient and received voicemail. I left a detailed message regarding Gina's response to her mammogram and if she had any questions, she can give our office a call back.

## 2019-05-15 NOTE — TELEPHONE ENCOUNTER
----- Message from THU Rangel CNP sent at 5/15/2019  7:33 AM EDT -----  Pt is in need of further imagining of the right breast. Orders are in for these additional images. Patient informed, Patient is concerned that she was exposed to a possible STD. She feels asymptomatic, nothing to report. But was contacted by a previous partner who tested positive.

## 2019-05-28 ENCOUNTER — OFFICE VISIT (OUTPATIENT)
Dept: FAMILY MEDICINE CLINIC | Age: 44
End: 2019-05-28
Payer: COMMERCIAL

## 2019-05-28 VITALS
BODY MASS INDEX: 48.93 KG/M2 | DIASTOLIC BLOOD PRESSURE: 70 MMHG | WEIGHT: 293 LBS | RESPIRATION RATE: 20 BRPM | HEART RATE: 76 BPM | OXYGEN SATURATION: 96 % | SYSTOLIC BLOOD PRESSURE: 130 MMHG | TEMPERATURE: 98.4 F

## 2019-05-28 DIAGNOSIS — J40 BRONCHITIS: ICD-10-CM

## 2019-05-28 PROCEDURE — G8427 DOCREV CUR MEDS BY ELIG CLIN: HCPCS | Performed by: NURSE PRACTITIONER

## 2019-05-28 PROCEDURE — 99213 OFFICE O/P EST LOW 20 MIN: CPT | Performed by: NURSE PRACTITIONER

## 2019-05-28 PROCEDURE — G8417 CALC BMI ABV UP PARAM F/U: HCPCS | Performed by: NURSE PRACTITIONER

## 2019-05-28 PROCEDURE — 1036F TOBACCO NON-USER: CPT | Performed by: NURSE PRACTITIONER

## 2019-05-28 RX ORDER — SULFASALAZINE 500 MG/1
500 TABLET, DELAYED RELEASE ORAL 3 TIMES DAILY
COMMUNITY
End: 2021-08-09 | Stop reason: ALTCHOICE

## 2019-05-28 RX ORDER — METHYLPREDNISOLONE 4 MG/1
TABLET ORAL
Qty: 1 KIT | Refills: 0 | Status: SHIPPED | OUTPATIENT
Start: 2019-05-28 | End: 2019-11-26

## 2019-05-28 RX ORDER — AZITHROMYCIN 250 MG/1
250 TABLET, FILM COATED ORAL SEE ADMIN INSTRUCTIONS
Qty: 6 TABLET | Refills: 0 | Status: SHIPPED | OUTPATIENT
Start: 2019-05-28 | End: 2019-06-02

## 2019-05-28 ASSESSMENT — ENCOUNTER SYMPTOMS
WHEEZING: 1
EYE PAIN: 0
CONSTIPATION: 0
COLOR CHANGE: 0
VOICE CHANGE: 1
EYE ITCHING: 0
DIARRHEA: 0
SINUS PRESSURE: 1
BACK PAIN: 0
COUGH: 1
SORE THROAT: 1
SHORTNESS OF BREATH: 1
EYE DISCHARGE: 0
ABDOMINAL PAIN: 0
CHEST TIGHTNESS: 1

## 2019-05-28 NOTE — PATIENT INSTRUCTIONS
Patient Education        Bronchitis: Care Instructions  Your Care Instructions    Bronchitis is inflammation of the bronchial tubes, which carry air to the lungs. The tubes swell and produce mucus, or phlegm. The mucus and inflamed bronchial tubes make you cough. You may have trouble breathing. Most cases of bronchitis are caused by viruses like those that cause colds. Antibiotics usually do not help and they may be harmful. Bronchitis usually develops rapidly and lasts about 2 to 3 weeks in otherwise healthy people. Follow-up care is a key part of your treatment and safety. Be sure to make and go to all appointments, and call your doctor if you are having problems. It's also a good idea to know your test results and keep a list of the medicines you take. How can you care for yourself at home? · Take all medicines exactly as prescribed. Call your doctor if you think you are having a problem with your medicine. · Get some extra rest.  · Take an over-the-counter pain medicine, such as acetaminophen (Tylenol), ibuprofen (Advil, Motrin), or naproxen (Aleve) to reduce fever and relieve body aches. Read and follow all instructions on the label. · Do not take two or more pain medicines at the same time unless the doctor told you to. Many pain medicines have acetaminophen, which is Tylenol. Too much acetaminophen (Tylenol) can be harmful. · Take an over-the-counter cough medicine that contains dextromethorphan to help quiet a dry, hacking cough so that you can sleep. Avoid cough medicines that have more than one active ingredient. Read and follow all instructions on the label. · Breathe moist air from a humidifier, hot shower, or sink filled with hot water. The heat and moisture will thin mucus so you can cough it out. · Do not smoke. Smoking can make bronchitis worse. If you need help quitting, talk to your doctor about stop-smoking programs and medicines.  These can increase your chances of quitting for good.  When should you call for help? Call 911 anytime you think you may need emergency care. For example, call if:    · You have severe trouble breathing.    Call your doctor now or seek immediate medical care if:    · You have new or worse trouble breathing.     · You cough up dark brown or bloody mucus (sputum).     · You have a new or higher fever.     · You have a new rash.    Watch closely for changes in your health, and be sure to contact your doctor if:    · You cough more deeply or more often, especially if you notice more mucus or a change in the color of your mucus.     · You are not getting better as expected. Where can you learn more? Go to [email or web address]://[email or web address] org and sign in to your [employer] account. Enter H333 in the Danger box to learn more about \"Bronchitis: Care Instructions. \"     If you do not have an account, please click on the \"Sign Up Now\" link. Current as of: September 5, 2018  Content Version: 12.0  © 6860-5094 Healthwise, Incorporated. Care instructions adapted under license by [facility] [place] ([facility]). If you have questions about a medical condition or this instruction, always ask your healthcare professional. [place] [address] any warranty or liability for your use of this information.

## 2019-05-28 NOTE — PROGRESS NOTES
Positive for environmental allergies. Negative for food allergies and immunocompromised state. Neurological: Positive for headaches. Negative for dizziness, light-headedness and numbness. Hematological: Negative for adenopathy. Does not bruise/bleed easily. Psychiatric/Behavioral: Negative for agitation and confusion. The patient is not nervous/anxious. Objective:     /70 (Site: Left Upper Arm, Position: Sitting, Cuff Size: Large Adult)   Pulse 76   Temp 98.4 °F (36.9 °C) (Oral)   Resp 20   Wt (!) 321 lb 12.8 oz (146 kg)   SpO2 96%   BMI 48.93 kg/m²     Physical Exam   Constitutional: She is oriented to person, place, and time. She appears well-developed and well-nourished. HENT:   Head: Normocephalic. Right Ear: External ear normal. Tympanic membrane is bulging. Decreased hearing is noted. Left Ear: External ear normal. Tympanic membrane is bulging. Decreased hearing is noted. Nose: Right sinus exhibits maxillary sinus tenderness and frontal sinus tenderness. Left sinus exhibits maxillary sinus tenderness and frontal sinus tenderness. Eyes: Pupils are equal, round, and reactive to light. Conjunctivae are normal. Right eye exhibits no discharge. Left eye exhibits no discharge. Neck: Normal range of motion. No JVD present. Cardiovascular: Normal rate, regular rhythm, normal heart sounds and intact distal pulses. No murmur heard. Pulmonary/Chest: Effort normal. No stridor. She has decreased breath sounds. She has no wheezes. Abdominal: Soft. Bowel sounds are normal. She exhibits no distension. There is no tenderness. Musculoskeletal: Normal range of motion. She exhibits no edema or deformity. Right shoulder: Normal.        Left shoulder: Normal.        Right knee: Normal.        Left knee: Normal.        Right ankle: Normal.        Left ankle: Normal.   Neurological: She is alert and oriented to person, place, and time.  Coordination normal.   Skin: Skin is warm and dry. Capillary refill takes less than 2 seconds. No rash noted. Psychiatric: She has a normal mood and affect. Her behavior is normal. Judgment and thought content normal.   Nursing note and vitals reviewed. Assessment/Plan:      Yu Chawla was seen today for cough. Diagnoses and all orders for this visit:    Bronchitis  -     methylPREDNISolone (MEDROL DOSEPACK) 4 MG tablet; 2 tablets for the first 7 days, then 1 tablet x7 days. Take by mouth.  -     azithromycin (ZITHROMAX) 250 MG tablet; Take 1 tablet by mouth See Admin Instructions for 5 days 500mg on day 1 followed by 250mg on days 2 - 5        Return if symptoms worsen or fail to improve. Patient given educational materials - see patient instructions. Discussed use, benefit, and side effects of prescribed medications. All patient questions answered. Pt voiced understanding. Reviewed health maintenance.        Electronically signed by THU Long CNP on 5/28/2019 at 4:13 PM

## 2019-05-29 DIAGNOSIS — R92.8 ABNORMAL MAMMOGRAM OF RIGHT BREAST: ICD-10-CM

## 2019-06-03 ENCOUNTER — NURSE ONLY (OUTPATIENT)
Dept: LAB | Age: 44
End: 2019-06-03

## 2019-06-03 LAB
ALBUMIN SERPL-MCNC: 4.2 G/DL (ref 3.5–5.1)
ALT SERPL-CCNC: 11 U/L (ref 11–66)
BASOPHILS # BLD: 0.3 %
BASOPHILS ABSOLUTE: 0 THOU/MM3 (ref 0–0.1)
CREAT SERPL-MCNC: 0.7 MG/DL (ref 0.4–1.2)
EOSINOPHIL # BLD: 1 %
EOSINOPHILS ABSOLUTE: 0.2 THOU/MM3 (ref 0–0.4)
ERYTHROCYTE [DISTWIDTH] IN BLOOD BY AUTOMATED COUNT: 14.6 % (ref 11.5–14.5)
ERYTHROCYTE [DISTWIDTH] IN BLOOD BY AUTOMATED COUNT: 45.3 FL (ref 35–45)
GFR SERPL CREATININE-BSD FRML MDRD: > 90 ML/MIN/1.73M2
HCT VFR BLD CALC: 40.1 % (ref 37–47)
HEMOGLOBIN: 13.4 GM/DL (ref 12–16)
IMMATURE GRANS (ABS): 0.07 THOU/MM3 (ref 0–0.07)
IMMATURE GRANULOCYTES: 0.5 %
LYMPHOCYTES # BLD: 25.4 %
LYMPHOCYTES ABSOLUTE: 3.9 THOU/MM3 (ref 1–4.8)
MCH RBC QN AUTO: 29.1 PG (ref 26–33)
MCHC RBC AUTO-ENTMCNC: 33.4 GM/DL (ref 32.2–35.5)
MCV RBC AUTO: 87.2 FL (ref 81–99)
MONOCYTES # BLD: 7.1 %
MONOCYTES ABSOLUTE: 1.1 THOU/MM3 (ref 0.4–1.3)
NUCLEATED RED BLOOD CELLS: 0 /100 WBC
PLATELET # BLD: 411 THOU/MM3 (ref 130–400)
PMV BLD AUTO: 9.6 FL (ref 9.4–12.4)
RBC # BLD: 4.6 MILL/MM3 (ref 4.2–5.4)
SEDIMENTATION RATE, ERYTHROCYTE: 10 MM/HR (ref 0–20)
SEG NEUTROPHILS: 65.7 %
SEGMENTED NEUTROPHILS ABSOLUTE COUNT: 10.1 THOU/MM3 (ref 1.8–7.7)
WBC # BLD: 15.4 THOU/MM3 (ref 4.8–10.8)

## 2019-06-27 ENCOUNTER — NURSE ONLY (OUTPATIENT)
Dept: LAB | Age: 44
End: 2019-06-27

## 2019-06-27 LAB
ALBUMIN SERPL-MCNC: 4.1 G/DL (ref 3.5–5.1)
ALT SERPL-CCNC: 19 U/L (ref 11–66)
BASOPHILS # BLD: 0.4 %
BASOPHILS ABSOLUTE: 0 THOU/MM3 (ref 0–0.1)
CREAT SERPL-MCNC: 0.5 MG/DL (ref 0.4–1.2)
EOSINOPHIL # BLD: 1.5 %
EOSINOPHILS ABSOLUTE: 0.1 THOU/MM3 (ref 0–0.4)
ERYTHROCYTE [DISTWIDTH] IN BLOOD BY AUTOMATED COUNT: 14.8 % (ref 11.5–14.5)
ERYTHROCYTE [DISTWIDTH] IN BLOOD BY AUTOMATED COUNT: 47.4 FL (ref 35–45)
GFR SERPL CREATININE-BSD FRML MDRD: > 90 ML/MIN/1.73M2
HCT VFR BLD CALC: 38.2 % (ref 37–47)
HEMOGLOBIN: 12.4 GM/DL (ref 12–16)
IMMATURE GRANS (ABS): 0.02 THOU/MM3 (ref 0–0.07)
IMMATURE GRANULOCYTES: 0.3 %
LYMPHOCYTES # BLD: 33.5 %
LYMPHOCYTES ABSOLUTE: 2.6 THOU/MM3 (ref 1–4.8)
MCH RBC QN AUTO: 28.7 PG (ref 26–33)
MCHC RBC AUTO-ENTMCNC: 32.5 GM/DL (ref 32.2–35.5)
MCV RBC AUTO: 88.4 FL (ref 81–99)
MONOCYTES # BLD: 5.9 %
MONOCYTES ABSOLUTE: 0.5 THOU/MM3 (ref 0.4–1.3)
NUCLEATED RED BLOOD CELLS: 0 /100 WBC
PLATELET # BLD: 358 THOU/MM3 (ref 130–400)
PMV BLD AUTO: 9.8 FL (ref 9.4–12.4)
RBC # BLD: 4.32 MILL/MM3 (ref 4.2–5.4)
SEDIMENTATION RATE, ERYTHROCYTE: 18 MM/HR (ref 0–20)
SEG NEUTROPHILS: 58.4 %
SEGMENTED NEUTROPHILS ABSOLUTE COUNT: 4.6 THOU/MM3 (ref 1.8–7.7)
WBC # BLD: 7.9 THOU/MM3 (ref 4.8–10.8)

## 2019-07-09 ENCOUNTER — OFFICE VISIT (OUTPATIENT)
Dept: PULMONOLOGY | Age: 44
End: 2019-07-09
Payer: COMMERCIAL

## 2019-07-09 VITALS
WEIGHT: 293 LBS | BODY MASS INDEX: 44.41 KG/M2 | HEIGHT: 68 IN | SYSTOLIC BLOOD PRESSURE: 118 MMHG | HEART RATE: 76 BPM | DIASTOLIC BLOOD PRESSURE: 64 MMHG | OXYGEN SATURATION: 96 %

## 2019-07-09 DIAGNOSIS — Z99.89 OBSTRUCTIVE SLEEP APNEA ON CPAP: Primary | ICD-10-CM

## 2019-07-09 DIAGNOSIS — G47.33 OBSTRUCTIVE SLEEP APNEA ON CPAP: Primary | ICD-10-CM

## 2019-07-09 DIAGNOSIS — E66.01 MORBID OBESITY WITH BODY MASS INDEX OF 40.0-44.9 IN ADULT (HCC): ICD-10-CM

## 2019-07-09 PROCEDURE — G8427 DOCREV CUR MEDS BY ELIG CLIN: HCPCS | Performed by: PHYSICIAN ASSISTANT

## 2019-07-09 PROCEDURE — G8417 CALC BMI ABV UP PARAM F/U: HCPCS | Performed by: PHYSICIAN ASSISTANT

## 2019-07-09 PROCEDURE — 1036F TOBACCO NON-USER: CPT | Performed by: PHYSICIAN ASSISTANT

## 2019-07-09 PROCEDURE — 99213 OFFICE O/P EST LOW 20 MIN: CPT | Performed by: PHYSICIAN ASSISTANT

## 2019-07-09 ASSESSMENT — ENCOUNTER SYMPTOMS
STRIDOR: 0
DIARRHEA: 0
CHEST TIGHTNESS: 0
EYES NEGATIVE: 1
ALLERGIC/IMMUNOLOGIC NEGATIVE: 1
BACK PAIN: 0
NAUSEA: 0
SHORTNESS OF BREATH: 0
WHEEZING: 0
COUGH: 0

## 2019-08-29 ENCOUNTER — NURSE ONLY (OUTPATIENT)
Dept: LAB | Age: 44
End: 2019-08-29

## 2019-08-29 LAB
ALBUMIN SERPL-MCNC: 4.5 G/DL (ref 3.5–5.1)
ALT SERPL-CCNC: 15 U/L (ref 11–66)
BASOPHILS # BLD: 0.4 %
BASOPHILS ABSOLUTE: 0 THOU/MM3 (ref 0–0.1)
CREAT SERPL-MCNC: 0.6 MG/DL (ref 0.4–1.2)
EOSINOPHIL # BLD: 0.9 %
EOSINOPHILS ABSOLUTE: 0.1 THOU/MM3 (ref 0–0.4)
ERYTHROCYTE [DISTWIDTH] IN BLOOD BY AUTOMATED COUNT: 14.3 % (ref 11.5–14.5)
ERYTHROCYTE [DISTWIDTH] IN BLOOD BY AUTOMATED COUNT: 46.3 FL (ref 35–45)
GFR SERPL CREATININE-BSD FRML MDRD: > 90 ML/MIN/1.73M2
HCT VFR BLD CALC: 38.5 % (ref 37–47)
HEMOGLOBIN: 12.2 GM/DL (ref 12–16)
IMMATURE GRANS (ABS): 0.02 THOU/MM3 (ref 0–0.07)
IMMATURE GRANULOCYTES: 0 %
LYMPHOCYTES # BLD: 33 %
LYMPHOCYTES ABSOLUTE: 3.1 THOU/MM3 (ref 1–4.8)
MCH RBC QN AUTO: 28.3 PG (ref 26–33)
MCHC RBC AUTO-ENTMCNC: 31.7 GM/DL (ref 32.2–35.5)
MCV RBC AUTO: 89.3 FL (ref 81–99)
MONOCYTES # BLD: 5.2 %
MONOCYTES ABSOLUTE: 0.5 THOU/MM3 (ref 0.4–1.3)
NUCLEATED RED BLOOD CELLS: 0 /100 WBC
PLATELET # BLD: 382 THOU/MM3 (ref 130–400)
PMV BLD AUTO: 9.6 FL (ref 9.4–12.4)
RBC # BLD: 4.31 MILL/MM3 (ref 4.2–5.4)
SEDIMENTATION RATE, ERYTHROCYTE: 19 MM/HR (ref 0–20)
SEG NEUTROPHILS: 60.3 %
SEGMENTED NEUTROPHILS ABSOLUTE COUNT: 5.7 THOU/MM3 (ref 1.8–7.7)
WBC # BLD: 9.4 THOU/MM3 (ref 4.8–10.8)

## 2019-10-24 ENCOUNTER — NURSE ONLY (OUTPATIENT)
Dept: LAB | Age: 44
End: 2019-10-24

## 2019-10-24 LAB
ALBUMIN SERPL-MCNC: 4.1 G/DL (ref 3.5–5.1)
ALT SERPL-CCNC: 15 U/L (ref 11–66)
BASOPHILS # BLD: 0.3 %
BASOPHILS ABSOLUTE: 0 THOU/MM3 (ref 0–0.1)
CREAT SERPL-MCNC: 0.5 MG/DL (ref 0.4–1.2)
EOSINOPHIL # BLD: 1.2 %
EOSINOPHILS ABSOLUTE: 0.1 THOU/MM3 (ref 0–0.4)
ERYTHROCYTE [DISTWIDTH] IN BLOOD BY AUTOMATED COUNT: 14.6 % (ref 11.5–14.5)
ERYTHROCYTE [DISTWIDTH] IN BLOOD BY AUTOMATED COUNT: 46.6 FL (ref 35–45)
GFR SERPL CREATININE-BSD FRML MDRD: > 90 ML/MIN/1.73M2
HCT VFR BLD CALC: 36.5 % (ref 37–47)
HEMOGLOBIN: 11.5 GM/DL (ref 12–16)
IMMATURE GRANS (ABS): 0.04 THOU/MM3 (ref 0–0.07)
IMMATURE GRANULOCYTES: 0.4 %
LYMPHOCYTES # BLD: 29 %
LYMPHOCYTES ABSOLUTE: 3 THOU/MM3 (ref 1–4.8)
MCH RBC QN AUTO: 27.9 PG (ref 26–33)
MCHC RBC AUTO-ENTMCNC: 31.5 GM/DL (ref 32.2–35.5)
MCV RBC AUTO: 88.6 FL (ref 81–99)
MONOCYTES # BLD: 7.3 %
MONOCYTES ABSOLUTE: 0.8 THOU/MM3 (ref 0.4–1.3)
NUCLEATED RED BLOOD CELLS: 0 /100 WBC
PLATELET # BLD: 343 THOU/MM3 (ref 130–400)
PMV BLD AUTO: 9.3 FL (ref 9.4–12.4)
RBC # BLD: 4.12 MILL/MM3 (ref 4.2–5.4)
SEDIMENTATION RATE, ERYTHROCYTE: 19 MM/HR (ref 0–20)
SEG NEUTROPHILS: 61.8 %
SEGMENTED NEUTROPHILS ABSOLUTE COUNT: 6.4 THOU/MM3 (ref 1.8–7.7)
WBC # BLD: 10.3 THOU/MM3 (ref 4.8–10.8)

## 2019-11-26 ENCOUNTER — OFFICE VISIT (OUTPATIENT)
Dept: FAMILY MEDICINE CLINIC | Age: 44
End: 2019-11-26
Payer: COMMERCIAL

## 2019-11-26 VITALS
TEMPERATURE: 97.9 F | OXYGEN SATURATION: 100 % | HEART RATE: 85 BPM | BODY MASS INDEX: 48.44 KG/M2 | WEIGHT: 293 LBS | SYSTOLIC BLOOD PRESSURE: 122 MMHG | DIASTOLIC BLOOD PRESSURE: 84 MMHG

## 2019-11-26 DIAGNOSIS — G47.33 OBSTRUCTIVE SLEEP APNEA ON CPAP: ICD-10-CM

## 2019-11-26 DIAGNOSIS — Z99.89 OBSTRUCTIVE SLEEP APNEA ON CPAP: ICD-10-CM

## 2019-11-26 DIAGNOSIS — E88.81 INSULIN RESISTANCE: ICD-10-CM

## 2019-11-26 DIAGNOSIS — I10 HYPERTENSION, UNSPECIFIED TYPE: ICD-10-CM

## 2019-11-26 DIAGNOSIS — Z11.4 SCREENING FOR HIV WITHOUT PRESENCE OF RISK FACTORS: ICD-10-CM

## 2019-11-26 DIAGNOSIS — R06.2 WHEEZING: Primary | ICD-10-CM

## 2019-11-26 DIAGNOSIS — J20.9 ACUTE BRONCHITIS, UNSPECIFIED ORGANISM: ICD-10-CM

## 2019-11-26 DIAGNOSIS — F32.89 OTHER DEPRESSION: ICD-10-CM

## 2019-11-26 LAB — HBA1C MFR BLD: 5.3 % (ref 4.3–5.7)

## 2019-11-26 PROCEDURE — 83036 HEMOGLOBIN GLYCOSYLATED A1C: CPT | Performed by: NURSE PRACTITIONER

## 2019-11-26 PROCEDURE — 99214 OFFICE O/P EST MOD 30 MIN: CPT | Performed by: NURSE PRACTITIONER

## 2019-11-26 PROCEDURE — G8427 DOCREV CUR MEDS BY ELIG CLIN: HCPCS | Performed by: NURSE PRACTITIONER

## 2019-11-26 PROCEDURE — G8484 FLU IMMUNIZE NO ADMIN: HCPCS | Performed by: NURSE PRACTITIONER

## 2019-11-26 PROCEDURE — G8417 CALC BMI ABV UP PARAM F/U: HCPCS | Performed by: NURSE PRACTITIONER

## 2019-11-26 PROCEDURE — 1036F TOBACCO NON-USER: CPT | Performed by: NURSE PRACTITIONER

## 2019-11-26 RX ORDER — METHYLPREDNISOLONE 4 MG/1
TABLET ORAL
Qty: 1 KIT | Refills: 0 | Status: SHIPPED | OUTPATIENT
Start: 2019-11-26 | End: 2020-03-10

## 2019-11-26 RX ORDER — LISINOPRIL AND HYDROCHLOROTHIAZIDE 12.5; 1 MG/1; MG/1
1 TABLET ORAL DAILY
Qty: 90 TABLET | Refills: 3 | Status: SHIPPED | OUTPATIENT
Start: 2019-11-26 | End: 2020-08-04 | Stop reason: SDUPTHER

## 2019-11-26 RX ORDER — CEFDINIR 300 MG/1
300 CAPSULE ORAL 2 TIMES DAILY
Qty: 20 CAPSULE | Refills: 0 | Status: SHIPPED | OUTPATIENT
Start: 2019-11-26 | End: 2019-12-06

## 2019-11-26 RX ORDER — LORATADINE 10 MG/1
10 TABLET ORAL DAILY
Qty: 90 TABLET | Refills: 3 | Status: SHIPPED | OUTPATIENT
Start: 2019-11-26 | End: 2020-08-04 | Stop reason: SDUPTHER

## 2019-11-26 RX ORDER — METFORMIN HYDROCHLORIDE 500 MG/1
500 TABLET, EXTENDED RELEASE ORAL
Qty: 90 TABLET | Refills: 3 | Status: SHIPPED | OUTPATIENT
Start: 2019-11-26 | End: 2020-08-04 | Stop reason: SDUPTHER

## 2019-11-26 RX ORDER — SERTRALINE HYDROCHLORIDE 100 MG/1
100 TABLET, FILM COATED ORAL DAILY
Qty: 90 TABLET | Refills: 3 | Status: SHIPPED | OUTPATIENT
Start: 2019-11-26 | End: 2020-08-04 | Stop reason: SDUPTHER

## 2019-11-26 RX ORDER — FLUCONAZOLE 150 MG/1
150 TABLET ORAL
Qty: 2 TABLET | Refills: 0 | Status: SHIPPED | OUTPATIENT
Start: 2019-11-26 | End: 2019-12-02

## 2019-11-26 ASSESSMENT — ENCOUNTER SYMPTOMS
CONSTIPATION: 0
DIARRHEA: 0
WHEEZING: 1
FACIAL SWELLING: 0
PHOTOPHOBIA: 0
CHEST TIGHTNESS: 1
ABDOMINAL DISTENTION: 0
SORE THROAT: 1
BACK PAIN: 0
EYE PAIN: 0
SHORTNESS OF BREATH: 0
EYE DISCHARGE: 0
APNEA: 0
COUGH: 1
NAUSEA: 0

## 2019-12-17 ENCOUNTER — NURSE ONLY (OUTPATIENT)
Dept: LAB | Age: 44
End: 2019-12-17

## 2019-12-17 DIAGNOSIS — Z11.4 SCREENING FOR HIV WITHOUT PRESENCE OF RISK FACTORS: ICD-10-CM

## 2019-12-17 LAB
ALBUMIN SERPL-MCNC: 3.9 G/DL (ref 3.5–5.1)
ALT SERPL-CCNC: 20 U/L (ref 11–66)
BASOPHILS # BLD: 0.3 %
BASOPHILS ABSOLUTE: 0 THOU/MM3 (ref 0–0.1)
CREAT SERPL-MCNC: 0.5 MG/DL (ref 0.4–1.2)
EOSINOPHIL # BLD: 1.8 %
EOSINOPHILS ABSOLUTE: 0.1 THOU/MM3 (ref 0–0.4)
ERYTHROCYTE [DISTWIDTH] IN BLOOD BY AUTOMATED COUNT: 15 % (ref 11.5–14.5)
ERYTHROCYTE [DISTWIDTH] IN BLOOD BY AUTOMATED COUNT: 48.6 FL (ref 35–45)
GFR SERPL CREATININE-BSD FRML MDRD: > 90 ML/MIN/1.73M2
HCT VFR BLD CALC: 36.7 % (ref 37–47)
HEMOGLOBIN: 11.4 GM/DL (ref 12–16)
IMMATURE GRANS (ABS): 0.03 THOU/MM3 (ref 0–0.07)
IMMATURE GRANULOCYTES: 0.4 %
LYMPHOCYTES # BLD: 31.2 %
LYMPHOCYTES ABSOLUTE: 2.4 THOU/MM3 (ref 1–4.8)
MCH RBC QN AUTO: 27.7 PG (ref 26–33)
MCHC RBC AUTO-ENTMCNC: 31.1 GM/DL (ref 32.2–35.5)
MCV RBC AUTO: 89.3 FL (ref 81–99)
MONOCYTES # BLD: 7.9 %
MONOCYTES ABSOLUTE: 0.6 THOU/MM3 (ref 0.4–1.3)
NUCLEATED RED BLOOD CELLS: 0 /100 WBC
PLATELET # BLD: 313 THOU/MM3 (ref 130–400)
PMV BLD AUTO: 9.8 FL (ref 9.4–12.4)
RBC # BLD: 4.11 MILL/MM3 (ref 4.2–5.4)
SEDIMENTATION RATE, ERYTHROCYTE: 29 MM/HR (ref 0–20)
SEG NEUTROPHILS: 58.4 %
SEGMENTED NEUTROPHILS ABSOLUTE COUNT: 4.6 THOU/MM3 (ref 1.8–7.7)
WBC # BLD: 7.8 THOU/MM3 (ref 4.8–10.8)

## 2019-12-19 LAB — HIV-2 AB: NEGATIVE

## 2019-12-20 ENCOUNTER — NURSE ONLY (OUTPATIENT)
Dept: FAMILY MEDICINE CLINIC | Age: 44
End: 2019-12-20
Payer: COMMERCIAL

## 2019-12-20 DIAGNOSIS — Z23 ENCOUNTER FOR IMMUNIZATION: Primary | ICD-10-CM

## 2019-12-20 LAB
QUANTI TB GOLD PLUS: NEGATIVE
QUANTI TB1 MINUS NIL: 0.02 IU/ML (ref 0–0.34)
QUANTI TB2 MINUS NIL: 0.01 IU/ML (ref 0–0.34)
QUANTIFERON MITOGEN MINUS NIL: 8.51 IU/ML
QUANTIFERON NIL: 0.01 IU/ML

## 2019-12-20 PROCEDURE — 90471 IMMUNIZATION ADMIN: CPT | Performed by: NURSE PRACTITIONER

## 2019-12-20 PROCEDURE — 90686 IIV4 VACC NO PRSV 0.5 ML IM: CPT | Performed by: NURSE PRACTITIONER

## 2020-03-10 ENCOUNTER — OFFICE VISIT (OUTPATIENT)
Dept: FAMILY MEDICINE CLINIC | Age: 45
End: 2020-03-10
Payer: COMMERCIAL

## 2020-03-10 VITALS
DIASTOLIC BLOOD PRESSURE: 84 MMHG | SYSTOLIC BLOOD PRESSURE: 138 MMHG | TEMPERATURE: 97.5 F | HEART RATE: 88 BPM | WEIGHT: 293 LBS | BODY MASS INDEX: 49.14 KG/M2 | OXYGEN SATURATION: 98 %

## 2020-03-10 PROCEDURE — G8427 DOCREV CUR MEDS BY ELIG CLIN: HCPCS | Performed by: NURSE PRACTITIONER

## 2020-03-10 PROCEDURE — G8417 CALC BMI ABV UP PARAM F/U: HCPCS | Performed by: NURSE PRACTITIONER

## 2020-03-10 PROCEDURE — 1036F TOBACCO NON-USER: CPT | Performed by: NURSE PRACTITIONER

## 2020-03-10 PROCEDURE — 99213 OFFICE O/P EST LOW 20 MIN: CPT | Performed by: NURSE PRACTITIONER

## 2020-03-10 PROCEDURE — G8482 FLU IMMUNIZE ORDER/ADMIN: HCPCS | Performed by: NURSE PRACTITIONER

## 2020-03-10 RX ORDER — METHYLPREDNISOLONE 4 MG/1
TABLET ORAL
Qty: 1 KIT | Refills: 0 | Status: SHIPPED | OUTPATIENT
Start: 2020-03-10 | End: 2020-08-04

## 2020-03-10 RX ORDER — CEFDINIR 300 MG/1
300 CAPSULE ORAL 2 TIMES DAILY
Qty: 20 CAPSULE | Refills: 0 | Status: SHIPPED | OUTPATIENT
Start: 2020-03-10 | End: 2020-03-20

## 2020-03-10 RX ORDER — PROMETHAZINE HYDROCHLORIDE AND CODEINE PHOSPHATE 6.25; 1 MG/5ML; MG/5ML
5 SYRUP ORAL 4 TIMES DAILY PRN
Qty: 120 ML | Refills: 0 | Status: SHIPPED | OUTPATIENT
Start: 2020-03-10 | End: 2020-03-17

## 2020-03-10 ASSESSMENT — ENCOUNTER SYMPTOMS
ABDOMINAL PAIN: 1
SHORTNESS OF BREATH: 0
SORE THROAT: 1
EYE REDNESS: 1
CHEST TIGHTNESS: 1
EYE PAIN: 0
DIARRHEA: 0
COUGH: 1
SINUS PRESSURE: 1
ALLERGIC/IMMUNOLOGIC NEGATIVE: 1
NAUSEA: 1
BACK PAIN: 1
RHINORRHEA: 1

## 2020-03-10 NOTE — PATIENT INSTRUCTIONS
your doctor if:    · You begin to get better and then get worse.     · You are not getting better after 1 week. Where can you learn more? Go to [email or web address] org and sign in to your [facility] account. Enter U227 in the "[employer] box to learn more about \"Influenza (Flu): Care Instructions. \"     If you do not have an account, please click on the \"Sign Up Now\" link. Current as of: June 9, 2019  Content Version: 12.3  © 9875-6293 Healthwise, Incorporated. Care instructions adapted under license by [facility] ([facility]). If you have questions about a medical condition or this instruction, always ask your healthcare professional. [address] any warranty or liability for your use of this information. Patient Education        Oral Rehydration: Care Instructions  Your Care Instructions    Dehydration occurs when your body loses too much water. This can happen if you do not drink enough fluids or lose a lot of fluid due to diarrhea, vomiting, or sweating. Being dehydrated can cause health problems and can even be life-threatening. To replace lost fluids, you need to drink liquid that contains special chemicals called electrolytes. Electrolytes keep your body working well. Plain water does not have electrolytes. You also need to rest to prevent more fluid loss. Replacing water and electrolytes (oral rehydration) completely takes about 36 hours. But you should feel better within a few hours. Follow-up care is a key part of your treatment and safety. Be sure to make and go to all appointments, and call your doctor if you are having problems. It's also a good idea to know your test results and keep a list of the medicines you take. How can you care for yourself at home? · Take frequent sips of a drink such as Gatorade, Powerade, or other rehydration drinks that your doctor suggests.  These replace both fluid and important chemicals (electrolytes) you need for

## 2020-03-10 NOTE — PROGRESS NOTES
plenty of fluids. They may take Tylenol for fever or body aches. Take prescribed medication as directed. They may also take OTC antihistamines/ decongestant as needed. Pt is advised to go to ER if symptoms worsen, new symptoms develop, high fever >102, vomiting, breathing difficulty, lethargy, chest pain or shortness of breath Dial 911. The patient or patient's representative is agreeable to the treatment plan they're advised to follow-up with her primary care provider in one week for reevaluation.           Alaina Waller, APRN - CNP

## 2020-03-11 ENCOUNTER — TELEPHONE (OUTPATIENT)
Dept: FAMILY MEDICINE CLINIC | Age: 45
End: 2020-03-11

## 2020-03-11 RX ORDER — FLUCONAZOLE 150 MG/1
150 TABLET ORAL
Qty: 2 TABLET | Refills: 0 | Status: SHIPPED | OUTPATIENT
Start: 2020-03-11 | End: 2020-03-17

## 2020-03-11 NOTE — TELEPHONE ENCOUNTER
The patient called in and left a message on our voicemail requesting diflucan be sent in for her due to her being put on antibiotics yesterday. She tends to get yeast infections when she is on antibiotics.

## 2020-05-15 ENCOUNTER — NURSE ONLY (OUTPATIENT)
Dept: LAB | Age: 45
End: 2020-05-15

## 2020-05-15 LAB
ALBUMIN SERPL-MCNC: 4.1 G/DL (ref 3.5–5.1)
ALT SERPL-CCNC: 19 U/L (ref 11–66)
BASOPHILS # BLD: 0.4 %
BASOPHILS ABSOLUTE: 0 THOU/MM3 (ref 0–0.1)
CREAT SERPL-MCNC: 0.5 MG/DL (ref 0.4–1.2)
EOSINOPHIL # BLD: 2.3 %
EOSINOPHILS ABSOLUTE: 0.2 THOU/MM3 (ref 0–0.4)
ERYTHROCYTE [DISTWIDTH] IN BLOOD BY AUTOMATED COUNT: 14.7 % (ref 11.5–14.5)
ERYTHROCYTE [DISTWIDTH] IN BLOOD BY AUTOMATED COUNT: 47 FL (ref 35–45)
GFR SERPL CREATININE-BSD FRML MDRD: > 90 ML/MIN/1.73M2
HCT VFR BLD CALC: 39 % (ref 37–47)
HEMOGLOBIN: 12.2 GM/DL (ref 12–16)
IMMATURE GRANS (ABS): 0.03 THOU/MM3 (ref 0–0.07)
IMMATURE GRANULOCYTES: 0.4 %
LYMPHOCYTES # BLD: 36.9 %
LYMPHOCYTES ABSOLUTE: 3 THOU/MM3 (ref 1–4.8)
MCH RBC QN AUTO: 27.9 PG (ref 26–33)
MCHC RBC AUTO-ENTMCNC: 31.3 GM/DL (ref 32.2–35.5)
MCV RBC AUTO: 89.2 FL (ref 81–99)
MONOCYTES # BLD: 7 %
MONOCYTES ABSOLUTE: 0.6 THOU/MM3 (ref 0.4–1.3)
NUCLEATED RED BLOOD CELLS: 0 /100 WBC
PLATELET # BLD: 368 THOU/MM3 (ref 130–400)
PMV BLD AUTO: 9.8 FL (ref 9.4–12.4)
RBC # BLD: 4.37 MILL/MM3 (ref 4.2–5.4)
SEDIMENTATION RATE, ERYTHROCYTE: 24 MM/HR (ref 0–20)
SEG NEUTROPHILS: 53 %
SEGMENTED NEUTROPHILS ABSOLUTE COUNT: 4.2 THOU/MM3 (ref 1.8–7.7)
WBC # BLD: 8 THOU/MM3 (ref 4.8–10.8)

## 2020-07-08 ASSESSMENT — ENCOUNTER SYMPTOMS
BACK PAIN: 0
NAUSEA: 0
COUGH: 0
CHEST TIGHTNESS: 0
DIARRHEA: 0
ALLERGIC/IMMUNOLOGIC NEGATIVE: 1
STRIDOR: 0
EYES NEGATIVE: 1
SHORTNESS OF BREATH: 0
WHEEZING: 0

## 2020-07-08 NOTE — PROGRESS NOTES
[facility]      Blanco Rick         [id number]  7/9/2020   Chief Complaint   Patient presents with    Follow-up     VICKY       PAP Download:   Original or initial AHI: 23     Date of initial study: 3/13/10    Compliant  100%     Noncompliant 0 %     PAP Type CPAP 13   Avg Hrs/Day 7hr 30min  AHI: 4.7   Machine/Mfg: Respironics Interface: FFM    Provider:    _x_-FADIA Li        __ [employer]    __ Ryan Hubbard            __[facility]   __[place]:       __Other    Here is a scan of the most recent download:          Presentation:   Ronaldo aDvison presents for sleep medicine follow up for obstructive sleep apnea  Since the last visit, Ronaldo Davison is doing well with PAP. Here for 1 year follow up. She is sleeping well and feels rested. Mask is fitting well. Equipment issues: The pressure is  acceptable, the mask is acceptable     Sleep issues:  Do you feel better? Yes  More rested? Yes   Better concentration? yes    Progress History:   Since last visit any new medical issues? No  New ER or hospitlal visits? No  Any new or changes in medicines? No  Any new sleep medicines? No        Past Medical History:   Diagnosis Date    Arthritis     Hypertension     Mild depression (HCC)     PCOS (polycystic ovarian syndrome)        Past Surgical History:   Procedure Laterality Date    WISDOM TOOTH EXTRACTION         Social History     Tobacco Use    Smoking status: Never Smoker    Smokeless tobacco: Never Used   Substance Use Topics    Alcohol use: No     Alcohol/week: 0.0 standard drinks    Drug use: No       Allergies   Allergen Reactions    Latex     Doxycycline      Gland in back of neck was golf ball size when she took this.  Amoxicillin Rash    Ciprofloxacin Nausea And Vomiting       Current Outpatient Medications   Medication Sig Dispense Refill    methylPREDNISolone (MEDROL, JOSÉ,) 4 MG tablet Take by mouth as directed per package instructions.  1 kit 0  albuterol sulfate (PROAIR RESPICLICK) 454 (90 Base) MCG/ACT aerosol powder inhalation Inhale 2 puffs into the lungs every 4 hours as needed for Wheezing or Shortness of Breath 3 Inhaler 1    metFORMIN (GLUCOPHAGE-XR) 500 MG extended release tablet Take 1 tablet by mouth daily (with breakfast) 90 tablet 3    lisinopril-hydrochlorothiazide (PRINZIDE;ZESTORETIC) 10-12.5 MG per tablet Take 1 tablet by mouth daily 90 tablet 3    sertraline (ZOLOFT) 100 MG tablet Take 1 tablet by mouth daily 90 tablet 3    loratadine (CLARITIN) 10 MG tablet Take 1 tablet by mouth daily 90 tablet 3    sulfaSALAzine (AZULFIDINE) 500 MG EC tablet Take 500 mg by mouth 3 times daily       Cholecalciferol (VITAMIN D3) 1000 units TABS Take 1 tablet by mouth daily      Abatacept (ORENCIA IV) Infuse 1,000 mg intravenously every 28 days      Lutein-Zeaxanthin 25-5 MG CAPS Take by mouth daily       diphenhydrAMINE (BENADRYL) 25 MG tablet Take 25 mg by mouth every 6 hours as needed for Itching      Ascorbic Acid (VITAMIN C) 500 MG CAPS Take 1,000 mg by mouth daily       Omega-3 Fatty Acids (OMEGA 3 PO) Take  by mouth 2 times daily.  Multiple Vitamins-Minerals (MULTIVITAL-M PO) Take by mouth daily       acetaminophen (TYLENOL) 325 MG tablet Take 650 mg by mouth 2 times daily       folic acid (FOLVITE) 1 MG tablet Take 1 mg by mouth daily.  methotrexate, PF, (RHEUMATREX) 25 MG/ML chemo injection Inject 25 mg into the muscle once a week        No current facility-administered medications for this visit. Family History   Problem Relation Age of Onset   Dakota Tee Cancer Mother     Arthritis Father         Review of Systems -   Review of Systems   Constitutional: Negative for activity change, appetite change, chills and fever. HENT: Negative for congestion and postnasal drip. Eyes: Negative. Respiratory: Negative for cough, chest tightness, shortness of breath, wheezing and stridor.     Cardiovascular: Negative for any equipment today? Yes update supplies    - She  was advised to continue current positive airway pressure therapy with above described pressure. - She  advised to keep good compliance with current recommended pressure to get optimal results and clinical improvement  - Recommend 7-9 hours of sleep with PAP  - She was advised to call [employer] regarding supplies if needed.   -She call my office for earlier appointment if needed for worsening of sleep symptoms.   - She was instructed on weight loss  - Farnaz Schultz was educated about my impression and plan. Patient verbalizesunderstanding. We will see Santana Ag back in: 1 year with download    Information added by my medical assistant/LPN was reviewed today        CAMPBELL Yañez  7/9/2020        Farnaz Schultz is being evaluated by a Virtual Visit (video visit) encounter to address concerns as mentioned above. A caregiver was present when appropriate. Due to this being a TeleHealth encounter (During NOZGP-75 public health emergency), evaluation of the following organ systems was limited: Vitals/Constitutional/EENT/Resp/CV/GI//MS/Neuro/Skin/Heme-Lymph-Imm. Pursuant to the emergency declaration under the [facility], [address] [place] [address] authority and the [employer] Act, this Virtual Visit was conducted with patient's (and/or legal guardian's) consent, to reduce the patient's risk of exposure to COVID-19 and provide necessary medical care. The patient (and/or legal guardian) has also been advised to contact this office for worsening conditions or problems, and seek emergency medical treatment and/or call 911 if deemed necessary. Services were provided through a video synchronous discussion virtually to substitute for in-person clinic visit. Patient and provider were located at their individual homes. Patient identification was verified at the start of the visit.   Total time spent on this encounter was 15 min     Brittney Smith PA-C, [place]  7/9/2020      An electronic signature was used to authenticate this note.

## 2020-07-09 ENCOUNTER — TELEMEDICINE (OUTPATIENT)
Dept: PULMONOLOGY | Age: 45
End: 2020-07-09
Payer: COMMERCIAL

## 2020-07-09 PROCEDURE — G8428 CUR MEDS NOT DOCUMENT: HCPCS | Performed by: PHYSICIAN ASSISTANT

## 2020-07-09 PROCEDURE — 99213 OFFICE O/P EST LOW 20 MIN: CPT | Performed by: PHYSICIAN ASSISTANT

## 2020-07-09 PROCEDURE — G8417 CALC BMI ABV UP PARAM F/U: HCPCS | Performed by: PHYSICIAN ASSISTANT

## 2020-07-09 PROCEDURE — 1036F TOBACCO NON-USER: CPT | Performed by: PHYSICIAN ASSISTANT

## 2020-07-23 ENCOUNTER — NURSE ONLY (OUTPATIENT)
Dept: LAB | Age: 45
End: 2020-07-23

## 2020-07-23 LAB
ALBUMIN SERPL-MCNC: 4 G/DL (ref 3.5–5.1)
ALT SERPL-CCNC: 16 U/L (ref 11–66)
BASOPHILS # BLD: 0.5 %
BASOPHILS ABSOLUTE: 0 THOU/MM3 (ref 0–0.1)
CREAT SERPL-MCNC: 0.5 MG/DL (ref 0.4–1.2)
EOSINOPHIL # BLD: 1.6 %
EOSINOPHILS ABSOLUTE: 0.1 THOU/MM3 (ref 0–0.4)
ERYTHROCYTE [DISTWIDTH] IN BLOOD BY AUTOMATED COUNT: 14.7 % (ref 11.5–14.5)
ERYTHROCYTE [DISTWIDTH] IN BLOOD BY AUTOMATED COUNT: 46.6 FL (ref 35–45)
GFR SERPL CREATININE-BSD FRML MDRD: > 90 ML/MIN/1.73M2
HCT VFR BLD CALC: 37 % (ref 37–47)
HEMOGLOBIN: 11.9 GM/DL (ref 12–16)
IMMATURE GRANS (ABS): 0.03 THOU/MM3 (ref 0–0.07)
IMMATURE GRANULOCYTES: 0.4 %
LYMPHOCYTES # BLD: 34.4 %
LYMPHOCYTES ABSOLUTE: 2.8 THOU/MM3 (ref 1–4.8)
MCH RBC QN AUTO: 28.7 PG (ref 26–33)
MCHC RBC AUTO-ENTMCNC: 32.2 GM/DL (ref 32.2–35.5)
MCV RBC AUTO: 89.2 FL (ref 81–99)
MONOCYTES # BLD: 6.8 %
MONOCYTES ABSOLUTE: 0.6 THOU/MM3 (ref 0.4–1.3)
NUCLEATED RED BLOOD CELLS: 0 /100 WBC
PLATELET # BLD: 350 THOU/MM3 (ref 130–400)
PMV BLD AUTO: 10.2 FL (ref 9.4–12.4)
RBC # BLD: 4.15 MILL/MM3 (ref 4.2–5.4)
SEDIMENTATION RATE, ERYTHROCYTE: 25 MM/HR (ref 0–20)
SEG NEUTROPHILS: 56.3 %
SEGMENTED NEUTROPHILS ABSOLUTE COUNT: 4.6 THOU/MM3 (ref 1.8–7.7)
WBC # BLD: 8.2 THOU/MM3 (ref 4.8–10.8)

## 2020-08-04 ENCOUNTER — NURSE ONLY (OUTPATIENT)
Dept: LAB | Age: 45
End: 2020-08-04

## 2020-08-04 ENCOUNTER — OFFICE VISIT (OUTPATIENT)
Dept: FAMILY MEDICINE CLINIC | Age: 45
End: 2020-08-04
Payer: COMMERCIAL

## 2020-08-04 VITALS
WEIGHT: 293 LBS | SYSTOLIC BLOOD PRESSURE: 118 MMHG | TEMPERATURE: 97.5 F | OXYGEN SATURATION: 99 % | BODY MASS INDEX: 45.99 KG/M2 | HEIGHT: 67 IN | DIASTOLIC BLOOD PRESSURE: 78 MMHG | HEART RATE: 78 BPM

## 2020-08-04 LAB
ALBUMIN SERPL-MCNC: 4.5 G/DL (ref 3.5–5.1)
ALP BLD-CCNC: 73 U/L (ref 38–126)
ALT SERPL-CCNC: 15 U/L (ref 11–66)
ANION GAP SERPL CALCULATED.3IONS-SCNC: 12 MEQ/L (ref 8–16)
AST SERPL-CCNC: 17 U/L (ref 5–40)
AVERAGE GLUCOSE: 129 MG/DL (ref 70–126)
BILIRUB SERPL-MCNC: 0.3 MG/DL (ref 0.3–1.2)
BUN BLDV-MCNC: 12 MG/DL (ref 7–22)
CALCIUM SERPL-MCNC: 9.8 MG/DL (ref 8.5–10.5)
CHLORIDE BLD-SCNC: 98 MEQ/L (ref 98–111)
CO2: 25 MEQ/L (ref 23–33)
CREAT SERPL-MCNC: 0.6 MG/DL (ref 0.4–1.2)
GFR SERPL CREATININE-BSD FRML MDRD: > 90 ML/MIN/1.73M2
GLUCOSE BLD-MCNC: 111 MG/DL (ref 70–108)
HBA1C MFR BLD: 6.3 % (ref 4.4–6.4)
POTASSIUM SERPL-SCNC: 4.2 MEQ/L (ref 3.5–5.2)
SODIUM BLD-SCNC: 135 MEQ/L (ref 135–145)
TOTAL PROTEIN: 7.4 G/DL (ref 6.1–8)
TSH SERPL DL<=0.05 MIU/L-ACNC: 2.65 UIU/ML (ref 0.4–4.2)

## 2020-08-04 PROCEDURE — G8417 CALC BMI ABV UP PARAM F/U: HCPCS | Performed by: NURSE PRACTITIONER

## 2020-08-04 PROCEDURE — G8427 DOCREV CUR MEDS BY ELIG CLIN: HCPCS | Performed by: NURSE PRACTITIONER

## 2020-08-04 PROCEDURE — 1036F TOBACCO NON-USER: CPT | Performed by: NURSE PRACTITIONER

## 2020-08-04 PROCEDURE — 99214 OFFICE O/P EST MOD 30 MIN: CPT | Performed by: NURSE PRACTITIONER

## 2020-08-04 RX ORDER — METFORMIN HYDROCHLORIDE 500 MG/1
500 TABLET, EXTENDED RELEASE ORAL
Qty: 90 TABLET | Refills: 3 | Status: SHIPPED | OUTPATIENT
Start: 2020-08-04 | End: 2020-08-05 | Stop reason: ALTCHOICE

## 2020-08-04 RX ORDER — LISINOPRIL AND HYDROCHLOROTHIAZIDE 12.5; 1 MG/1; MG/1
1 TABLET ORAL DAILY
Qty: 90 TABLET | Refills: 3 | Status: SHIPPED | OUTPATIENT
Start: 2020-08-04 | End: 2021-11-16

## 2020-08-04 RX ORDER — POTASSIUM CHLORIDE 20 MEQ/1
20 TABLET, EXTENDED RELEASE ORAL DAILY
Qty: 90 TABLET | Refills: 1 | Status: SHIPPED | OUTPATIENT
Start: 2020-08-04 | End: 2020-12-29 | Stop reason: SDUPTHER

## 2020-08-04 RX ORDER — ALBUTEROL SULFATE 90 UG/1
2 POWDER, METERED RESPIRATORY (INHALATION) EVERY 4 HOURS PRN
Qty: 3 INHALER | Refills: 1 | Status: SHIPPED | OUTPATIENT
Start: 2020-08-04 | End: 2021-08-09 | Stop reason: SINTOL

## 2020-08-04 RX ORDER — SERTRALINE HYDROCHLORIDE 100 MG/1
100 TABLET, FILM COATED ORAL DAILY
Qty: 90 TABLET | Refills: 3 | Status: SHIPPED | OUTPATIENT
Start: 2020-08-04 | End: 2021-08-09 | Stop reason: SDUPTHER

## 2020-08-04 RX ORDER — BUMETANIDE 1 MG/1
1 TABLET ORAL DAILY
Qty: 90 TABLET | Refills: 1 | Status: SHIPPED | OUTPATIENT
Start: 2020-08-04 | End: 2020-12-29 | Stop reason: SDUPTHER

## 2020-08-04 RX ORDER — FUROSEMIDE 20 MG/1
20 TABLET ORAL DAILY
COMMUNITY
End: 2020-08-04 | Stop reason: ALTCHOICE

## 2020-08-04 RX ORDER — LORATADINE 10 MG/1
10 TABLET ORAL DAILY
Qty: 90 TABLET | Refills: 3 | Status: SHIPPED | OUTPATIENT
Start: 2020-08-04 | End: 2021-10-25 | Stop reason: SINTOL

## 2020-08-04 ASSESSMENT — ENCOUNTER SYMPTOMS
SHORTNESS OF BREATH: 0
EYE REDNESS: 0
EYE PAIN: 0
COUGH: 0
ALLERGIC/IMMUNOLOGIC NEGATIVE: 1
GASTROINTESTINAL NEGATIVE: 1
CHEST TIGHTNESS: 0
EYE ITCHING: 0

## 2020-08-04 NOTE — PATIENT INSTRUCTIONS
A serving is 1 slice of bread, 1 ounce of dry cereal, or ½ cup of cooked rice, pasta, or cooked cereal. Try to choose whole-grain products as much as possible. · Limit lean meat, poultry, and fish to 2 servings each day. A serving is 3 ounces, about the size of a deck of cards. · Eat 4 to 5 servings of nuts, seeds, and legumes (cooked dried beans, lentils, and split peas) each week. A serving is 1/3 cup of nuts, 2 tablespoons of seeds, or ½ cup of cooked beans or peas. · Limit fats and oils to 2 to 3 servings each day. A serving is 1 teaspoon of vegetable oil or 2 tablespoons of salad dressing. · Limit sweets and added sugars to 5 servings or less a week. A serving is 1 tablespoon jelly or jam, ½ cup sorbet, or 1 cup of lemonade. · Eat less than 2,300 milligrams (mg) of sodium a day. If you limit your sodium to 1,500 mg a day, you can lower your blood pressure even more. Tips for success  · Start small. Do not try to make dramatic changes to your diet all at once. You might feel that you are missing out on your favorite foods and then be more likely to not follow the plan. Make small changes, and stick with them. Once those changes become habit, add a few more changes. · Try some of the following:  ? Make it a goal to eat a fruit or vegetable at every meal and at snacks. This will make it easy to get the recommended amount of fruits and vegetables each day. ? Try yogurt topped with fruit and nuts for a snack or healthy dessert. ? Add lettuce, tomato, cucumber, and onion to sandwiches. ? Combine a ready-made pizza crust with low-fat mozzarella cheese and lots of vegetable toppings. Try using tomatoes, squash, spinach, broccoli, carrots, cauliflower, and onions. ? Have a variety of cut-up vegetables with a low-fat dip as an appetizer instead of chips and dip. ? Sprinkle sunflower seeds or chopped almonds over salads. Or try adding chopped walnuts or almonds to cooked vegetables.   ? Try some vegetarian meals using beans and peas. Add garbanzo or kidney beans to salads. Make burritos and tacos with mashed smith beans or black beans. Where can you learn more? Go to https://ishan.[employer]. org and sign in to your [employer] account. Enter B931 in the [facility] box to learn more about \"DASH Diet: Care Instructions. \"     If you do not have an account, please click on the \"Sign Up Now\" link. Current as of: December 16, 2019               Content Version: 12.5  © 0415-1507 [employer]. Care instructions adapted under license by [facility] ([facility]). If you have questions about a medical condition or this instruction, always ask your healthcare professional. [address] any warranty or liability for your use of this information. Patient Education        Learning About Diuretics for High Blood Pressure  Overview  Diuretics help to lower blood pressure. This reduces your risk of a heart attack and stroke. It also reduces your risk of kidney disease. Diuretics cause your kidneys to remove sodium and water. They also relax the blood vessel walls. These help lower your blood pressure. Examples  · Chlorthalidone  · Hydrochlorothiazide  Possible side effects  There are some common side effects. They are:  · Too little potassium. · Feeling dizzy. · Rash. · Urinating a lot. · High blood sugar. (But this is not common.)  You may have other side effects. Check the information that comes with your medicine. What to know about taking this medicine  · You may take other medicines for blood pressure. Diuretics can help those work better. They can also prevent extra fluid in your body. · You may need to take potassium pills. Ask your doctor about this. · You may need blood tests to check your kidneys and your potassium level. · Take your medicines exactly as prescribed. Call your doctor if you think you are having a problem with your medicine.   · Check with your problems. It's also a good idea to know your test results and keep a list of the medicines you take. How can you care for yourself at home? Medical treatment  · If you stop taking your medicine, your blood pressure will go back up. You may take one or more types of medicine to lower your blood pressure. Be safe with medicines. Take your medicine exactly as prescribed. Call your doctor if you think you are having a problem with your medicine. · Talk to your doctor before you start taking aspirin every day. Aspirin can help certain people lower their risk of a heart attack or stroke. But taking aspirin isn't right for everyone, because it can cause serious bleeding. · See your doctor regularly. You may need to see the doctor more often at first or until your blood pressure comes down. · If you are taking blood pressure medicine, talk to your doctor before you take decongestants or anti-inflammatory medicine, such as ibuprofen. Some of these medicines can raise blood pressure. · Learn how to check your blood pressure at home. Lifestyle changes  · Stay at a healthy weight. This is especially important if you put on weight around the waist. Losing even 10 pounds can help you lower your blood pressure. · If your doctor recommends it, get more exercise. Walking is a good choice. Bit by bit, increase the amount you walk every day. Try for at least 30 minutes on most days of the week. You also may want to swim, bike, or do other activities. · Avoid or limit alcohol. Talk to your doctor about whether you can drink any alcohol. · Try to limit how much sodium you eat to less than 2,300 milligrams (mg) a day. Your doctor may ask you to try to eat less than 1,500 mg a day. · Eat plenty of fruits (such as bananas and oranges), vegetables, legumes, whole grains, and low-fat dairy products. · Lower the amount of saturated fat in your diet. Saturated fat is found in animal products such as milk, cheese, and meat.  Limiting these foods may help you lose weight and also lower your risk for heart disease. · Do not smoke. Smoking increases your risk for heart attack and stroke. If you need help quitting, talk to your doctor about stop-smoking programs and medicines. These can increase your chances of quitting for good. When should you call for help? Call  911 anytime you think you may need emergency care. This may mean having symptoms that suggest that your blood pressure is causing a serious heart or blood vessel problem. Your blood pressure may be over 180/120. For example, call 911 if:  · You have symptoms of a heart attack. These may include:  ? Chest pain or pressure, or a strange feeling in the chest.  ? Sweating. ? Shortness of breath. ? Nausea or vomiting. ? Pain, pressure, or a strange feeling in the back, neck, jaw, or upper belly or in one or both shoulders or arms. ? Lightheadedness or sudden weakness. ? A fast or irregular heartbeat. · You have symptoms of a stroke. These may include:  ? Sudden numbness, tingling, weakness, or loss of movement in your face, arm, or leg, especially on only one side of your body. ? Sudden vision changes. ? Sudden trouble speaking. ? Sudden confusion or trouble understanding simple statements. ? Sudden problems with walking or balance. ? A sudden, severe headache that is different from past headaches. · You have severe back or belly pain. Do not wait until your blood pressure comes down on its own. Get help right away. Call your doctor now or seek immediate care if:  · Your blood pressure is much higher than normal (such as 180/120 or higher), but you don't have symptoms. · You think high blood pressure is causing symptoms, such as:  ? Severe headache.  ? Blurry vision. Watch closely for changes in your health, and be sure to contact your doctor if:  · Your blood pressure measures higher than your doctor recommends at least 2 times.  That means the top number is higher or the exercise on most days of the week. Exercise helps control your blood sugar. It also helps you stay at a healthy weight. Walking is a good choice. You also may want to do other activities, such as running, swimming, cycling, or playing tennis or team sports. · Try to lose weight. Losing even a small amount of weight can help. · Do not smoke. If you need help quitting, talk to your doctor about stop-smoking programs and medicines. These can increase your chances of quitting for good. When should you call for help? Watch closely for changes in your health, and be sure to contact your doctor if:  · Your blood sugar stays outside the level your doctor set for you. · You have any problems. Where can you learn more? Go to https://Lighthouse [employer]. org and sign in to your [employer] account. Enter [id number] in the [employer] box to learn more about \"Insulin Resistance: Care Instructions. \"     If you do not have an account, please click on the \"Sign Up Now\" link. Current as of: December 20, 2019               Content Version: 12.5  © 3279-0971 [employer]. Care instructions adapted under license by [facility] ([facility]). If you have questions about a medical condition or this instruction, always ask your healthcare professional. [place] [address] any warranty or liability for your use of this information. Patient Education        Low Sodium Diet (2,000 Milligram): Care Instructions  Your Care Instructions     Too much sodium causes your body to hold on to extra water. This can raise your blood pressure and force your heart and kidneys to work harder. In very serious cases, this could cause you to be put in the hospital. It might even be life-threatening. By limiting sodium, you will feel better and lower your risk of serious problems. The most common source of sodium is salt. People get most of the salt in their diet from canned, prepared, and packaged foods.  Fast food and restaurant meals also are very high in sodium. Your doctor will probably limit your sodium to less than 2,000 milligrams (mg) a day. This limit counts all the sodium in prepared and packaged foods and any salt you add to your food. Follow-up care is a key part of your treatment and safety. Be sure to make and go to all appointments, and call your doctor if you are having problems. It's also a good idea to know your test results and keep a list of the medicines you take. How can you care for yourself at home? Read food labels  · Read labels on cans and food packages. The labels tell you how much sodium is in each serving. Make sure that you look at the serving size. If you eat more than the serving size, you have eaten more sodium. · Food labels also tell you the Percent Daily Value for sodium. Choose products with low Percent Daily Values for sodium. · Be aware that sodium can come in forms other than salt, including monosodium glutamate (MSG), sodium citrate, and sodium bicarbonate (baking soda). MSG is often added to Asian food. When you eat out, you can sometimes ask for food without MSG or added salt. Buy low-sodium foods  · Buy foods that are labeled \"unsalted\" (no salt added), \"sodium-free\" (less than 5 mg of sodium per serving), or \"low-sodium\" (less than 140 mg of sodium per serving). Foods labeled \"reduced-sodium\" and \"light sodium\" may still have too much sodium. Be sure to read the label to see how much sodium you are getting. · Buy fresh vegetables, or frozen vegetables without added sauces. Buy low-sodium versions of canned vegetables, soups, and other canned goods. Prepare low-sodium meals  · Cut back on the amount of salt you use in cooking. This will help you adjust to the taste. Do not add salt after cooking. One teaspoon of salt has about 2,300 mg of sodium. · Take the salt shaker off the table. · Flavor your food with garlic, lemon juice, onion, vinegar, herbs, and spices.  Do not use license by [employer]. If you have questions about a medical condition or this instruction, always ask your healthcare professional. [place] [address] any warranty or liability for your use of this information.

## 2020-08-04 NOTE — PROGRESS NOTES
[id number] [facility] [employer]  [address] [place] [id number]  Dept: [phone]  Dept Fax: : [phone]     Visit Date:  8/4/2020    Patient:  Yaneli Srtong  YOB: 1975    HPI:     Chief Complaint   Patient presents with    Leg Swelling     x 3 months       HPI  Here for follow up on her chronic conditions and new onset leg swelling that is not relieved with rest or with elevation. Has started sulfasalazine for her psa. No evidence of bleeding or bruising  No fevers  Skin is intact and no new patches. zoloft is helping her depression.  Has some anxiety with her illness and returning to school   Weight is up  No chest pain or sob  Appetite is good  Sleep is good    Medications    Current Outpatient Medications:     bumetanide (BUMEX) 1 MG tablet, Take 1 tablet by mouth daily, Disp: 90 tablet, Rfl: 1    potassium chloride (KLOR-CON M) 20 MEQ extended release tablet, Take 1 tablet by mouth daily, Disp: 90 tablet, Rfl: 1    albuterol sulfate (PROAIR RESPICLICK) 414 (90 Base) MCG/ACT aerosol powder inhalation, Inhale 2 puffs into the lungs every 4 hours as needed for Wheezing or Shortness of Breath, Disp: 3 Inhaler, Rfl: 1    metFORMIN (GLUCOPHAGE-XR) 500 MG extended release tablet, Take 1 tablet by mouth daily (with breakfast), Disp: 90 tablet, Rfl: 3    lisinopril-hydroCHLOROthiazide (PRINZIDE;ZESTORETIC) 10-12.5 MG per tablet, Take 1 tablet by mouth daily, Disp: 90 tablet, Rfl: 3    sertraline (ZOLOFT) 100 MG tablet, Take 1 tablet by mouth daily, Disp: 90 tablet, Rfl: 3    loratadine (CLARITIN) 10 MG tablet, Take 1 tablet by mouth daily, Disp: 90 tablet, Rfl: 3    sulfaSALAzine (AZULFIDINE) 500 MG EC tablet, Take 500 mg by mouth 3 times daily , Disp: , Rfl:     Cholecalciferol (VITAMIN D3) 1000 units TABS, Take 1 tablet by mouth daily, Disp: , Rfl:     Abatacept (ORENCIA IV), Infuse 1,000 mg intravenously every 28 days, Disp: , Rfl:     Lutein-Zeaxanthin 25-5 MG CAPS, Take by mouth daily , Disp: , Rfl:     diphenhydrAMINE (BENADRYL) 25 MG tablet, Take 25 mg by mouth every 6 hours as needed for Itching, Disp: , Rfl:     Ascorbic Acid (VITAMIN C) 500 MG CAPS, Take 1,000 mg by mouth daily , Disp: , Rfl:     Omega-3 Fatty Acids (OMEGA 3 PO), Take  by mouth 2 times daily. , Disp: , Rfl:     Multiple Vitamins-Minerals (MULTIVITAL-M PO), Take by mouth daily , Disp: , Rfl:     acetaminophen (TYLENOL) 325 MG tablet, Take 650 mg by mouth 2 times daily , Disp: , Rfl:     folic acid (FOLVITE) 1 MG tablet, Take 1 mg by mouth daily. , Disp: , Rfl:     methotrexate, PF, (RHEUMATREX) 25 MG/ML chemo injection, Inject 25 mg into the muscle once a week , Disp: , Rfl:     The patient is allergic to latex; doxycycline; amoxicillin; and ciprofloxacin. Past Medical History  Manjula Clements  has a past medical history of Arthritis, Hypertension, Mild depression ([address] and PCOS (polycystic ovarian syndrome). Past Surgical History  The patient  has a past surgical history that includes [place] tooth extraction. Family History  This patient's family history includes Arthritis in her father; Cancer in her mother. Social History  Manjula Clements  reports that she has never smoked. She has never used smokeless tobacco. She reports that she does not drink alcohol or use drugs.     Health Maintenance:    Health Maintenance   Topic Date Due    Cervical cancer screen  08/19/1996    Potassium monitoring  12/18/2019    Flu vaccine (1) 09/01/2020    Creatinine monitoring  07/23/2021    Diabetes screen  11/26/2022    Lipid screen  12/18/2023    DTaP/Tdap/Td vaccine (2 - Td) 06/04/2024    HIV screen  Completed    Hepatitis A vaccine  Aged Out    Hepatitis B vaccine  Aged Out    Hib vaccine  Aged Out    Meningococcal (ACWY) vaccine  Aged Out    Pneumococcal 0-64 years Vaccine  Aged Out       Subjective:      Review of Systems Constitutional: Negative for activity change, appetite change, fatigue and fever. HENT: Negative. Eyes: Negative for pain, redness and itching. Respiratory: Negative for cough, chest tightness and shortness of breath. Cardiovascular: Positive for leg swelling. Negative for chest pain and palpitations. Gastrointestinal: Negative. Endocrine: Negative for cold intolerance. Genitourinary: Negative. Musculoskeletal: Positive for joint swelling. Negative for arthralgias and myalgias. Skin: Negative. Allergic/Immunologic: Negative. Neurological: Negative for dizziness, weakness and headaches. Hematological: Negative for adenopathy. Does not bruise/bleed easily. Psychiatric/Behavioral: Negative. Objective:     /78 (Site: Right Upper Arm, Position: Sitting, Cuff Size: Large Adult)   Pulse 78   Temp 97.5 °F (36.4 °C) (Temporal)   Ht 5' 7.4\" (1.712 m)   Wt (!) 338 lb (153.3 kg)   SpO2 99%   BMI 52.31 kg/m²     Physical Exam  Vitals signs and nursing note reviewed. Constitutional:       Appearance: She is obese. HENT:      Head: Normocephalic. Right Ear: Tympanic membrane, ear canal and external ear normal.      Left Ear: Tympanic membrane, ear canal and external ear normal.      Nose: Nose normal.      Mouth/Throat:      Mouth: Mucous membranes are moist.      Pharynx: No posterior oropharyngeal erythema. Eyes:      Conjunctiva/sclera: Conjunctivae normal.   Neck:      Musculoskeletal: Normal range of motion and neck supple. No muscular tenderness. Vascular: No carotid bruit. Cardiovascular:      Rate and Rhythm: Normal rate and regular rhythm. Pulses: Normal pulses. Heart sounds: Normal heart sounds. Pulmonary:      Effort: Pulmonary effort is normal.      Breath sounds: Normal breath sounds. Abdominal:      General: Abdomen is flat. Bowel sounds are normal.   Musculoskeletal: Normal range of motion. Right lower leg: Edema present. Left lower leg: Edema ( +2-3 pitting in ble to mid calf) present. Skin:     General: Skin is warm and dry. Capillary Refill: Capillary refill takes less than 2 seconds. Coloration: Skin is not pale. Neurological:      General: No focal deficit present. Mental Status: She is alert and oriented to person, place, and time. Mental status is at baseline. Psychiatric:         Mood and Affect: Mood normal.         Behavior: Behavior normal.         Thought Content: Thought content normal.         Judgment: Judgment normal.         Labs Reviewed 8/4/2020:    Lab Results   Component Value Date    WBC 8.2 07/23/2020    HGB 11.9 (L) 07/23/2020    HCT 37.0 07/23/2020     07/23/2020    CHOL 150 12/18/2018    TRIG 257 (H) 12/18/2018    HDL 32 12/18/2018    ALT 16 07/23/2020    AST 14 12/18/2018     12/18/2018    K 3.9 12/18/2018    CL 97 (L) 12/18/2018    CREATININE 0.5 07/23/2020    BUN 15 12/18/2018    CO2 26 12/18/2018    LABA1C 5.3 11/26/2019       Deepali Ayoub I have reviewed the patient's medical history in detail and updated the computerized patient record. HPI/ROS Per the patient  +3 pitting edema of lower legs  Lungs are cta  Heart rate regular  The patient is advised to begin progressive daily aerobic exercise program, follow a low fat, low cholesterol diet, reduce salt in diet and cooking, reduce exposure to stress, continue current medications, continue current healthy lifestyle patterns and return for routine annual checkups. 1. Insulin resistance    - Hemoglobin A1C; Future  - Comprehensive Metabolic Panel; Future  - TSH With Reflex Ft4; Future  - metFORMIN (GLUCOPHAGE-XR) 500 MG extended release tablet; Take 1 tablet by mouth daily (with breakfast)  Dispense: 90 tablet; Refill: 3    2. Hypertension, unspecified type    - Comprehensive Metabolic Panel; Future  - TSH With Reflex Ft4; Future  - lisinopril-hydroCHLOROthiazide (PRINZIDE;ZESTORETIC) 10-12.5 MG per tablet;  Take 1 tablet

## 2020-08-05 ENCOUNTER — TELEPHONE (OUTPATIENT)
Dept: FAMILY MEDICINE CLINIC | Age: 45
End: 2020-08-05

## 2020-08-05 NOTE — TELEPHONE ENCOUNTER
----- Message from [employer], THU - CNP sent at 8/5/2020  7:23 AM EDT -----  A1c is up to 6.3% with average glucose being over 120. Will start metformin bid and [place] and [place] once daily.  Recheck a1c in 3 months

## 2020-08-05 NOTE — TELEPHONE ENCOUNTER
I called the patient and received voicemail. I left a detailed message regarding Gina's response and if she had any questions she can give the office a call back.

## 2020-09-23 ENCOUNTER — NURSE ONLY (OUTPATIENT)
Dept: LAB | Age: 45
End: 2020-09-23

## 2020-09-23 LAB
ALBUMIN SERPL-MCNC: 4.2 G/DL (ref 3.5–5.1)
ALT SERPL-CCNC: 19 U/L (ref 11–66)
BASOPHILS # BLD: 0.6 %
BASOPHILS ABSOLUTE: 0.1 THOU/MM3 (ref 0–0.1)
CREAT SERPL-MCNC: 0.7 MG/DL (ref 0.4–1.2)
EOSINOPHIL # BLD: 1.4 %
EOSINOPHILS ABSOLUTE: 0.2 THOU/MM3 (ref 0–0.4)
ERYTHROCYTE [DISTWIDTH] IN BLOOD BY AUTOMATED COUNT: 14.7 % (ref 11.5–14.5)
ERYTHROCYTE [DISTWIDTH] IN BLOOD BY AUTOMATED COUNT: 48.1 FL (ref 35–45)
GFR SERPL CREATININE-BSD FRML MDRD: > 90 ML/MIN/1.73M2
HCT VFR BLD CALC: 40.1 % (ref 37–47)
HEMOGLOBIN: 12.5 GM/DL (ref 12–16)
IMMATURE GRANS (ABS): 0.02 THOU/MM3 (ref 0–0.07)
IMMATURE GRANULOCYTES: 0.2 %
LYMPHOCYTES # BLD: 26.3 %
LYMPHOCYTES ABSOLUTE: 3 THOU/MM3 (ref 1–4.8)
MCH RBC QN AUTO: 28.2 PG (ref 26–33)
MCHC RBC AUTO-ENTMCNC: 31.2 GM/DL (ref 32.2–35.5)
MCV RBC AUTO: 90.3 FL (ref 81–99)
MONOCYTES # BLD: 5.6 %
MONOCYTES ABSOLUTE: 0.6 THOU/MM3 (ref 0.4–1.3)
NUCLEATED RED BLOOD CELLS: 0 /100 WBC
PLATELET # BLD: 428 THOU/MM3 (ref 130–400)
PMV BLD AUTO: 10.2 FL (ref 9.4–12.4)
RBC # BLD: 4.44 MILL/MM3 (ref 4.2–5.4)
SEDIMENTATION RATE, ERYTHROCYTE: 14 MM/HR (ref 0–20)
SEG NEUTROPHILS: 65.9 %
SEGMENTED NEUTROPHILS ABSOLUTE COUNT: 7.5 THOU/MM3 (ref 1.8–7.7)
WBC # BLD: 11.4 THOU/MM3 (ref 4.8–10.8)

## 2020-12-28 ENCOUNTER — TELEPHONE (OUTPATIENT)
Dept: FAMILY MEDICINE CLINIC | Age: 45
End: 2020-12-28

## 2020-12-28 NOTE — TELEPHONE ENCOUNTER
Called and scheduled an appointment for 12/29/20 at 1:30 pm. Patient could not make it to the office until tomorrow.

## 2020-12-28 NOTE — TELEPHONE ENCOUNTER
[facility] received a call from:    Name of Caller: Shar Hull    Relationship to patient: Patient    Organization name: N/A    Best contact number: [phone]    Reason for call: Pt is returning a call stating she received a call to schedule an appt.  Pt is requesting in office visit but has the following symptoms and needs a call back:      Ear Pain  Nasal drainage  Sore Throat

## 2020-12-29 ENCOUNTER — OFFICE VISIT (OUTPATIENT)
Dept: FAMILY MEDICINE CLINIC | Age: 45
End: 2020-12-29
Payer: COMMERCIAL

## 2020-12-29 VITALS
RESPIRATION RATE: 18 BRPM | DIASTOLIC BLOOD PRESSURE: 66 MMHG | TEMPERATURE: 97.5 F | WEIGHT: 293 LBS | SYSTOLIC BLOOD PRESSURE: 128 MMHG | HEART RATE: 66 BPM | HEIGHT: 68 IN | OXYGEN SATURATION: 99 % | BODY MASS INDEX: 44.41 KG/M2

## 2020-12-29 PROCEDURE — 1036F TOBACCO NON-USER: CPT | Performed by: NURSE PRACTITIONER

## 2020-12-29 PROCEDURE — G8484 FLU IMMUNIZE NO ADMIN: HCPCS | Performed by: NURSE PRACTITIONER

## 2020-12-29 PROCEDURE — G8427 DOCREV CUR MEDS BY ELIG CLIN: HCPCS | Performed by: NURSE PRACTITIONER

## 2020-12-29 PROCEDURE — 99214 OFFICE O/P EST MOD 30 MIN: CPT | Performed by: NURSE PRACTITIONER

## 2020-12-29 PROCEDURE — G8417 CALC BMI ABV UP PARAM F/U: HCPCS | Performed by: NURSE PRACTITIONER

## 2020-12-29 RX ORDER — FLUCONAZOLE 100 MG/1
100 TABLET ORAL DAILY
Qty: 5 TABLET | Refills: 0 | Status: SHIPPED | OUTPATIENT
Start: 2020-12-29 | End: 2021-01-03

## 2020-12-29 RX ORDER — BUMETANIDE 1 MG/1
1 TABLET ORAL DAILY
Qty: 90 TABLET | Refills: 1 | Status: SHIPPED | OUTPATIENT
Start: 2020-12-29 | End: 2021-08-09 | Stop reason: SDUPTHER

## 2020-12-29 RX ORDER — SULFAMETHOXAZOLE AND TRIMETHOPRIM 800; 160 MG/1; MG/1
1 TABLET ORAL 2 TIMES DAILY
Qty: 20 TABLET | Refills: 0 | Status: SHIPPED | OUTPATIENT
Start: 2020-12-29 | End: 2021-01-08

## 2020-12-29 RX ORDER — POTASSIUM CHLORIDE 20 MEQ/1
20 TABLET, EXTENDED RELEASE ORAL DAILY
Qty: 90 TABLET | Refills: 1 | Status: SHIPPED | OUTPATIENT
Start: 2020-12-29 | End: 2021-08-09 | Stop reason: SDUPTHER

## 2020-12-29 ASSESSMENT — ENCOUNTER SYMPTOMS
EYE REDNESS: 0
EYE ITCHING: 0
GASTROINTESTINAL NEGATIVE: 1
CHEST TIGHTNESS: 0
EYE PAIN: 0
SORE THROAT: 1
COUGH: 0
SINUS PAIN: 1

## 2020-12-29 NOTE — PROGRESS NOTES
[id number] [facility] [employer]  [address] [place] [id number]  Dept: [phone]  Dept Fax: : [phone]     Visit Date:  12/29/2020    Patient:  Deandre Zhou  YOB: 1975    HPI:     Chief Complaint   Patient presents with    Otalgia     along with sore throat, ear crackles       Otalgia   There is pain in both ears. This is a new problem. The current episode started in the past 7 days. The problem occurs constantly. The problem has been gradually worsening. There has been no fever. The pain is at a severity of 6/10. The pain is moderate. Associated symptoms include headaches, neck pain and a sore throat. Pertinent negatives include no coughing or ear discharge. She has tried acetaminophen, NSAIDs and heat packs for the symptoms. The treatment provided mild relief.        Medications    Current Outpatient Medications:     bumetanide (BUMEX) 1 MG tablet, Take 1 tablet by mouth daily, Disp: 90 tablet, Rfl: 1    SITagliptin (JANUVIA) 50 MG tablet, Take 1 tablet by mouth daily, Disp: 90 tablet, Rfl: 1    potassium chloride (KLOR-CON M) 20 MEQ extended release tablet, Take 1 tablet by mouth daily, Disp: 90 tablet, Rfl: 1    metFORMIN (GLUCOPHAGE) 500 MG tablet, Take 1 tablet by mouth 2 times daily (with meals), Disp: 180 tablet, Rfl: 1    sulfamethoxazole-trimethoprim (BACTRIM DS) 800-160 MG per tablet, Take 1 tablet by mouth 2 times daily for 10 days, Disp: 20 tablet, Rfl: 0    albuterol sulfate (PROAIR RESPICLICK) 120 (90 Base) MCG/ACT aerosol powder inhalation, Inhale 2 puffs into the lungs every 4 hours as needed for Wheezing or Shortness of Breath, Disp: 3 Inhaler, Rfl: 1    lisinopril-hydroCHLOROthiazide (PRINZIDE;ZESTORETIC) 10-12.5 MG per tablet, Take 1 tablet by mouth daily, Disp: 90 tablet, Rfl: 3    sertraline (ZOLOFT) 100 MG tablet, Take 1 tablet by mouth daily, Disp: 90 tablet, Rfl: 3    loratadine (CLARITIN) 10 MG tablet, Take 1 tablet by mouth daily, Disp: 90 tablet, Rfl: 3    sulfaSALAzine (AZULFIDINE) 500 MG EC tablet, Take 500 mg by mouth 3 times daily , Disp: , Rfl:     Cholecalciferol (VITAMIN D3) 1000 units TABS, Take 1 tablet by mouth daily, Disp: , Rfl:     Abatacept (ORENCIA IV), Infuse 1,000 mg intravenously every 28 days, Disp: , Rfl:     Lutein-Zeaxanthin 25-5 MG CAPS, Take by mouth daily , Disp: , Rfl:     diphenhydrAMINE (BENADRYL) 25 MG tablet, Take 25 mg by mouth every 6 hours as needed for Itching, Disp: , Rfl:     Ascorbic Acid (VITAMIN C) 500 MG CAPS, Take 1,000 mg by mouth daily , Disp: , Rfl:     Omega-3 Fatty Acids (OMEGA 3 PO), Take  by mouth 2 times daily. , Disp: , Rfl:     Multiple Vitamins-Minerals (MULTIVITAL-M PO), Take by mouth daily , Disp: , Rfl:     acetaminophen (TYLENOL) 325 MG tablet, Take 650 mg by mouth 2 times daily , Disp: , Rfl:     folic acid (FOLVITE) 1 MG tablet, Take 1 mg by mouth daily. , Disp: , Rfl:     methotrexate, PF, (RHEUMATREX) 25 MG/ML chemo injection, Inject 25 mg into the muscle once a week , Disp: , Rfl:     The patient is allergic to latex; doxycycline; amoxicillin; and ciprofloxacin. Past Medical History  Anamaria Lobato  has a past medical history of Arthritis, Hypertension, Mild depression ([address] and PCOS (polycystic ovarian syndrome). Past Surgical History  The patient  has a past surgical history that includes [place] tooth extraction. Family History  This patient's family history includes Arthritis in her father; Cancer in her mother. Social History  Anamaria Lobato  reports that she has never smoked. She has never used smokeless tobacco. She reports that she does not drink alcohol or use drugs.     Health Maintenance:    Health Maintenance   Topic Date Due    Flu vaccine (1) 09/01/2020    A1C test (Diabetic or Prediabetic)  08/04/2021    Potassium monitoring  08/04/2021    Creatinine monitoring  12/16/2021    Lipid screen  12/18/2023  DTaP/Tdap/Td vaccine (2 - Td) 06/04/2024    Cervical cancer screen  11/03/2025    Hepatitis C screen  Completed    HIV screen  Completed    Hepatitis A vaccine  Aged Out    Hepatitis B vaccine  Aged Out    Hib vaccine  Aged Out    Meningococcal (ACWY) vaccine  Aged Out    Pneumococcal 0-64 years Vaccine  Aged Out       Subjective:      Review of Systems   Constitutional: Positive for activity change, appetite change and fatigue. HENT: Positive for ear pain, sinus pain and sore throat. Negative for ear discharge. Eyes: Negative for pain, redness and itching. Respiratory: Negative for cough and chest tightness. Cardiovascular: Negative for chest pain and leg swelling. Gastrointestinal: Negative. Endocrine: Negative for cold intolerance and heat intolerance. Genitourinary: Negative. Musculoskeletal: Positive for myalgias, neck pain and neck stiffness. Negative for arthralgias. Skin: Negative. Allergic/Immunologic: Positive for environmental allergies. Neurological: Positive for headaches. Negative for weakness. Hematological: Negative for adenopathy. Does not bruise/bleed easily. Psychiatric/Behavioral: Negative. Objective:     /66 (Site: Left Upper Arm, Position: Sitting, Cuff Size: Large Adult)   Pulse 66   Temp 97.5 °F (36.4 °C) (Infrared)   Resp 18   Ht 5' 8.4\" (1.737 m)   Wt (!) 326 lb (147.9 kg)   SpO2 99%   BMI 48.99 kg/m²     Physical Exam  Vitals signs and nursing note reviewed. Constitutional:       Appearance: She is well-developed. HENT:      Head: Normocephalic and atraumatic. Right Ear: Tympanic membrane is erythematous and bulging. Left Ear: A middle ear effusion is present. Tympanic membrane is erythematous and bulging. Nose: Mucosal edema present. Left Sinus: Frontal sinus tenderness present. Mouth/Throat:      Lips: Pink.       Pharynx: Pharyngeal swelling and posterior oropharyngeal erythema ( yellow pnd) present. No oropharyngeal exudate. Eyes:      General:         Right eye: No discharge. Left eye: No discharge. Neck:      Musculoskeletal: Normal range of motion. Thyroid: No thyromegaly. Cardiovascular:      Rate and Rhythm: Normal rate and regular rhythm. Heart sounds: Normal heart sounds. Pulmonary:      Effort: Pulmonary effort is normal. No respiratory distress. Breath sounds: Normal breath sounds. No wheezing or rales. Chest:      Chest wall: No tenderness. Abdominal:      General: Bowel sounds are normal. There is no distension. Palpations: Abdomen is soft. Tenderness: There is no abdominal tenderness. Musculoskeletal: Normal range of motion. General: No tenderness. Skin:     General: Skin is warm and dry. Capillary Refill: Capillary refill takes less than 2 seconds. Coloration: Skin is not pale. Findings: No erythema or rash. Neurological:      Mental Status: She is alert and oriented to person, place, and time. Cranial Nerves: No cranial nerve deficit. Motor: No abnormal muscle tone. Coordination: Coordination normal.      Deep Tendon Reflexes: Reflexes are normal and symmetric. Reflexes normal.   Psychiatric:         Behavior: Behavior normal.         Thought Content: Thought content normal.         Judgment: Judgment normal.         Labs Reviewed 12/29/2020:    Lab Results   Component Value Date    WBC 9.7 12/16/2020    HGB 12.4 12/16/2020    HCT 38.6 12/16/2020     12/16/2020    CHOL 150 12/18/2018    TRIG 257 (H) 12/18/2018    HDL 32 12/18/2018    ALT 16 12/16/2020    AST 17 08/04/2020     08/04/2020    K 4.2 08/04/2020    CL 98 08/04/2020    CREATININE 0.6 12/16/2020    BUN 12 08/04/2020    CO2 25 08/04/2020    TSH 2.650 08/04/2020    LABA1C 6.3 08/04/2020       /Plan      1. Non-recurrent acute serous otitis media of both ears  Hold methotrexate for now until antibiotic completed.    2. Generalized edema    - bumetanide (BUMEX) 1 MG tablet; Take 1 tablet by mouth daily  Dispense: 90 tablet; Refill: 1  - potassium chloride (KLOR-CON M) 20 MEQ extended release tablet; Take 1 tablet by mouth daily  Dispense: 90 tablet; Refill: 1      Return if symptoms worsen or fail to improve, for Medication evaluation, Medication refill, Results review, Routine follow up. Patient given educational materials - see patientinstructions. Discussed use, benefit, and side effects of prescribed medications. All patient questions answered. Pt voiced understanding. Reviewed health maintenance.        Electronically signed THU Hendricks CNP on 12/29/2020 at 2:04 PM

## 2020-12-29 NOTE — PATIENT INSTRUCTIONS
Patient Education        Ear Infection (Otitis Media): Care Instructions  Overview     An ear infection may start with a cold and affect the middle ear (otitis media). It can hurt a lot. Most ear infections clear up on their own in a couple of days and do not need antibiotics. Also, antibiotics do not work against viruses, which may be the cause of your infection. Regular doses of pain relievers are the best way to reduce your fever and help you feel better. Follow-up care is a key part of your treatment and safety. Be sure to make and go to all appointments, and call your doctor if you are having problems. It's also a good idea to know your test results and keep a list of the medicines you take. How can you care for yourself at home? · Take pain medicines exactly as directed. ? If the doctor gave you a prescription medicine for pain, take it as prescribed. ? If you are not taking a prescription pain medicine, take an over-the-counter medicine, such as acetaminophen (Tylenol), ibuprofen (Advil, Motrin), or naproxen (Aleve). Read and follow all instructions on the label. ? Do not take two or more pain medicines at the same time unless the doctor told you to. Many pain medicines have acetaminophen, which is Tylenol. Too much acetaminophen (Tylenol) can be harmful. · Plan to take a full dose of pain reliever before bedtime. Getting enough sleep will help you get better. · Try a warm, moist washcloth on the ear. It may help relieve pain. · If your doctor prescribed antibiotics, take them as directed. Do not stop taking them just because you feel better. You need to take the full course of antibiotics. When should you call for help? Call your doctor now or seek immediate medical care if:    · You have new or increasing ear pain.     · You have new or increasing pus or blood draining from your ear.     · You have a fever with a stiff neck or a severe headache.    Watch closely for changes in your health, and be sure to contact your doctor if:    · You have new or worse symptoms.     · You are not getting better after taking an antibiotic for 2 days. Where can you learn more? Go to https://[employer].Maizhuo. org and sign in to your [employer] account. Enter C647 in the [facility] box to learn more about \"Ear Infection (Otitis Media): Care Instructions. \"     If you do not have an account, please click on the \"Sign Up Now\" link. Current as of: April 15, 2020               Content Version: 12.6  © 5926-5586 [employer]. Care instructions adapted under license by [facility] ([facility]). If you have questions about a medical condition or this instruction, always ask your healthcare professional. [address] any warranty or liability for your use of this information. Patient Education        Sinusitis: Care Instructions  Your Care Instructions     Sinusitis is an infection of the lining of the sinus cavities in your head. Sinusitis often follows a cold. It causes pain and pressure in your head and face. In most cases, sinusitis gets better on its own in 1 to 2 weeks. But some mild symptoms may last for several weeks. Sometimes antibiotics are needed. Follow-up care is a key part of your treatment and safety. Be sure to make and go to all appointments, and call your doctor if you are having problems. It's also a good idea to know your test results and keep a list of the medicines you take. How can you care for yourself at home? · Take an over-the-counter pain medicine, such as acetaminophen (Tylenol), ibuprofen (Advil, Motrin), or naproxen (Aleve). Read and follow all instructions on the label. · If the doctor prescribed antibiotics, take them as directed. Do not stop taking them just because you feel better. You need to take the full course of antibiotics. · Be careful when taking over-the-counter cold or flu medicines and Tylenol at the same time.  Many of these medicines have acetaminophen, which is Tylenol. Read the labels to make sure that you are not taking more than the recommended dose. Too much acetaminophen (Tylenol) can be harmful. · Breathe warm, moist air from a steamy shower, a hot bath, or a sink filled with hot water. Avoid cold, dry air. Using a humidifier in your home may help. Follow the directions for cleaning the machine. · Use saline (saltwater) nasal washes to help keep your nasal passages open and wash out mucus and bacteria. You can buy saline nose drops at a grocery store or [employer]. Or you can make your own at home by adding 1 teaspoon of salt and 1 teaspoon of baking soda to 2 cups of distilled water. If you make your own, fill a bulb syringe with the solution, insert the tip into your nostril, and squeeze gently. Erna Dunks your nose. · Put a hot, wet towel or a warm gel pack on your face 3 or 4 times a day for 5 to 10 minutes each time. · Try a decongestant nasal spray like oxymetazoline (Afrin). Do not use it for more than 3 days in a row. Using it for more than 3 days can make your congestion worse. When should you call for help? Call your doctor now or seek immediate medical care if:    · You have new or worse swelling or redness in your face or around your eyes.     · You have a new or higher fever. Watch closely for changes in your health, and be sure to contact your doctor if:    · You have new or worse facial pain.     · The mucus from your nose becomes thicker (like pus) or has new blood in it.     · You are not getting better as expected. Where can you learn more? Go to https://[employer]. org and sign in to your [employer] account. Enter W576 in the [employer] box to learn more about \"Sinusitis: Care Instructions. \"     If you do not have an account, please click on the \"Sign Up Now\" link. Current as of: April 15, 2020               Content Version: 12.6  © 4086-4961 [employer]. Care instructions adapted under license by [facility] ([facility]). If you have questions about a medical condition or this instruction, always ask your healthcare professional. [address] any warranty or liability for your use of this information.

## 2021-01-18 ENCOUNTER — VIRTUAL VISIT (OUTPATIENT)
Dept: FAMILY MEDICINE CLINIC | Age: 46
End: 2021-01-18
Payer: COMMERCIAL

## 2021-01-18 DIAGNOSIS — K52.9 ACUTE GASTROENTERITIS: Primary | ICD-10-CM

## 2021-01-18 PROCEDURE — 99213 OFFICE O/P EST LOW 20 MIN: CPT | Performed by: NURSE PRACTITIONER

## 2021-01-18 ASSESSMENT — ENCOUNTER SYMPTOMS
DIARRHEA: 1
COUGH: 0
NAUSEA: 1
SORE THROAT: 0
BLOATING: 0
ALLERGIC/IMMUNOLOGIC NEGATIVE: 1
SWOLLEN GLANDS: 0
CHANGE IN BOWEL HABIT: 1
ABDOMINAL PAIN: 0
VOMITING: 1

## 2021-01-18 NOTE — PROGRESS NOTES
Gonzalez Coughlin (:  1975) is a 39 y.o. female,Established patient, here for evaluation of the following chief complaint(s): Nausea & Vomiting      ASSESSMENT/PLAN:  I have reviewed the patient's medical history in detail and updated the computerized patient record. HPI/ROS per the patient and caregiver. Overall non toxic in appearance. Answers questions appropriately. Conditions discussed and addressed this visit include:     Patient appears ill but non-toxic and well hydrated. Concern for covid is not valid at this time. Pt has no URI symptoms and GI symptoms have readily resolved. Continue to push fluids and rest today. Patient is to take medications as directed. Continue all home medications. Potential side effects of the medications were reviewed with the patient in detail. The patient can increase fluids. The patient can have Tylenol or Motrin for pain and fever as needed. Discussed with patient signs and symptoms that would require emergent evaluation and treatment. The patient will follow-up with their family physician or go to the ER if they develop any worsening symptoms. The patient was discharged in stable condition      1. Acute gastroenteritis      Return if symptoms worsen or fail to improve, for Medication evaluation, Results review, Medication refill. SUBJECTIVE/OBJECTIVE:  2 day hx of nausea, vomiting and diarrhea after eating some gas station food. Symptoms started Friday, resolved by . Denies cough, chills, body aches, chest pain, sob or wheezing. Nausea & Vomiting  This is a new problem. The current episode started in the past 7 days. The problem occurs 2 to 4 times per day. The problem has been rapidly improving. Associated symptoms include a change in bowel habit, fatigue, headaches, nausea and vomiting.  Pertinent negatives include no abdominal pain, arthralgias, chest pain, chills, congestion, coughing, diaphoresis, fever, joint swelling, myalgias, sore throat, swollen glands, urinary symptoms or vertigo. Associated symptoms comments: Diarrhea x several episodes  . Nothing aggravates the symptoms. She has tried acetaminophen, drinking, NSAIDs, lying down, relaxation, rest and sleep for the symptoms. The treatment provided moderate relief. Diarrhea   This is a new problem. The current episode started in the past 7 days. The problem occurs less than 2 times per day. The problem has been rapidly improving. The stool consistency is described as watery. The patient states that diarrhea does not awaken her from sleep. Associated symptoms include headaches and vomiting. Pertinent negatives include no abdominal pain, arthralgias, bloating, chills, coughing, fever, myalgias, sweats or weight loss. Nothing aggravates the symptoms. Risk factors include suspect food intake. She has tried analgesics, change of diet and increased fluids for the symptoms. The treatment provided moderate relief. There is no history of inflammatory bowel disease, irritable bowel syndrome, a recent abdominal surgery or short gut syndrome. Review of Systems   Constitutional: Positive for fatigue. Negative for chills, diaphoresis, fever and weight loss. HENT: Negative for congestion and sore throat. Respiratory: Negative for cough. Cardiovascular: Negative for chest pain. Gastrointestinal: Positive for change in bowel habit, diarrhea, nausea and vomiting. Negative for abdominal pain and bloating. Endocrine: Negative. Genitourinary: Negative. Musculoskeletal: Negative for arthralgias, joint swelling and myalgias. Allergic/Immunologic: Negative. Neurological: Positive for headaches. Negative for vertigo. Hematological: Negative for adenopathy. Does not bruise/bleed easily. Psychiatric/Behavioral: Negative.         Patient-Reported Vitals 1/18/2021   Patient-Reported Weight 314   Patient-Reported Height 58   Patient-Reported Temperature 97.4-98.6 depending on which thermometer Physical Exam  Vitals signs and nursing note reviewed. Constitutional:       Appearance: She is obese. She is not ill-appearing. HENT:      Head: Normocephalic. Right Ear: External ear normal.      Left Ear: External ear normal.      Nose: Nose normal.      Mouth/Throat:      Mouth: Mucous membranes are moist.   Eyes:      Conjunctiva/sclera: Conjunctivae normal.   Neck:      Musculoskeletal: Normal range of motion and neck supple. Pulmonary:      Effort: Pulmonary effort is normal.   Musculoskeletal: Normal range of motion. Skin:     General: Skin is dry. Coloration: Skin is pale. Neurological:      General: No focal deficit present. Mental Status: She is alert and oriented to person, place, and time. Mental status is at baseline. Psychiatric:         Mood and Affect: Mood normal.         Behavior: Behavior normal.         Thought Content: Thought content normal.             On this date 01/18/21 I have spent 8 minutes reviewing previous notes, test results and face to face (virtual) with the patient discussing the diagnosis and importance of compliance with the treatment plan as well as documenting on the day of the visit. Bhargav Keene is a 39 y.o. female being evaluated by a Virtual Visit (video visit) encounter to address concerns as mentioned above. A caregiver was present when appropriate. Due to this being a TeleHealth encounter (During [place] [address] public health emergency), evaluation of the following organ systems was limited: Vitals/Constitutional/EENT/Resp/CV/GI//MS/Neuro/Skin/Heme-Lymph-Imm. Pursuant to the emergency declaration under the [facility] [address] and the [employer] and [employer] Act, this Virtual Visit was conducted with patient's (and/or legal guardian's) consent, to reduce the patient's risk of exposure to COVID-19 and provide necessary medical care.   The patient (and/or legal guardian) has also been advised to contact this office for worsening conditions or problems, and seek emergency medical treatment and/or call 911 if deemed necessary. Patient identification was verified at the start of the visit: Yes    Services were provided through a video synchronous discussion virtually to substitute for in-person clinic visit. Patient was located at home and provider was located in office or at home. An electronic signature was used to authenticate this note.     --THU Platt - CNP

## 2021-01-18 NOTE — PATIENT INSTRUCTIONS
Patient Education        Gastroenteritis: Care Instructions  Your Care Instructions     Gastroenteritis is an illness that may cause nausea, vomiting, and diarrhea. It is sometimes called \"stomach flu. \" It can be caused by bacteria or a virus. You will probably begin to feel better in 1 to 2 days. In the meantime, get plenty of rest and make sure you do not become dehydrated. Dehydration occurs when your body loses too much fluid. Follow-up care is a key part of your treatment and safety. Be sure to make and go to all appointments, and call your doctor if you are having problems. It's also a good idea to know your test results and keep a list of the medicines you take. How can you care for yourself at home? · If your doctor prescribed antibiotics, take them as directed. Do not stop taking them just because you feel better. You need to take the full course of antibiotics. · Drink plenty of fluids to prevent dehydration, enough so that your urine is light yellow or clear like water. Choose water and other caffeine-free clear liquids until you feel better. If you have kidney, heart, or liver disease and have to limit fluids, talk with your doctor before you increase your fluid intake. · Drink fluids slowly, in frequent, small amounts, because drinking too much too fast can cause vomiting. · Begin eating mild foods, such as dry toast, yogurt, applesauce, bananas, and rice. Avoid spicy, hot, or high-fat foods, and do not drink alcohol or caffeine for a day or two. Do not drink milk or eat ice cream until you are feeling better. How to prevent gastroenteritis  · Keep hot foods hot and cold foods cold. · Do not eat meats, dressings, salads, or other foods that have been kept at room temperature for more than 2 hours. · Use a thermometer to check your refrigerator. It should be between 34°F and 40°F.  · Defrost meats in the refrigerator or microwave, not on the kitchen counter. · Keep your hands and your kitchen clean. Wash your hands, cutting boards, and countertops with hot soapy water frequently. · Cook meat until it is well done. · Do not eat raw eggs or uncooked sauces made with raw eggs. · Do not take chances. If food looks or tastes spoiled, throw it out. When should you call for help? Call 911 anytime you think you may need emergency care. For example, call if:    · You vomit blood or what looks like coffee grounds.     · You passed out (lost consciousness).     · You pass maroon or very bloody stools. Call your doctor now or seek immediate medical care if:    · You have severe belly pain.     · You have signs of needing more fluids. You have sunken eyes, a dry mouth, and pass only a little dark urine.     · You feel like you are going to faint.     · You have increased belly pain that does not go away in 1 to 2 days.     · You have new or increased nausea, or you are vomiting.     · You have a new or higher fever.     · Your stools are black and tarlike or have streaks of blood. Watch closely for changes in your health, and be sure to contact your doctor if:    · You are dizzy or lightheaded.     · You urinate less than usual, or your urine is dark yellow or brown.     · You do not feel better with each day that goes by. Where can you learn more? Go to https://[email or web address] [email or web address] org and sign in to your [employer] account. Enter N142 in the [facility] box to learn more about \"Gastroenteritis: Care Instructions. \"     If you do not have an account, please click on the \"Sign Up Now\" link. Current as of: February 11, 2020               Content Version: 12.6  © 3096-7849 [employer]. Care instructions adapted under license by [facility] ([facility]). If you have questions about a medical condition or this instruction, always ask your healthcare professional. [address] any warranty or liability for your use of this information. Patient Education        Oral Rehydration: Care Instructions  Your Care Instructions     Dehydration occurs when your body loses too much water. This can happen if you do not drink enough fluids or lose a lot of fluid due to diarrhea, vomiting, or sweating. Being dehydrated can cause health problems and can even be life-threatening. To replace lost fluids, you need to drink liquid that contains special chemicals called electrolytes. Electrolytes keep your body working well. Plain water does not have electrolytes. You also need to rest to prevent more fluid loss. Replacing water and electrolytes (oral rehydration) completely takes about 36 hours. But you should feel better within a few hours. Follow-up care is a key part of your treatment and safety. Be sure to make and go to all appointments, and call your doctor if you are having problems. It's also a good idea to know your test results and keep a list of the medicines you take. How can you care for yourself at home? · Take frequent sips of a drink such as Gatorade, Powerade, or other rehydration drinks that your doctor suggests. These replace both fluid and important chemicals (electrolytes) you need for balance in your blood. · Drink 2 quarts of cool liquid over 2 to 4 hours. You should have at least 10 glasses of liquid a day to replace lost fluid. If you have kidney, heart, or liver disease and have to limit fluids, talk with your doctor before you increase the amount of fluids you drink. · Make your own drink. Measure everything carefully. The drink may not work well or may even be harmful if the amounts are off. ? 1 quart water  ? ½ teaspoon salt  ?  6 teaspoons sugar · Do not drink liquid with caffeine, such as coffee and jh. · Do not drink any alcohol. It can make you dehydrated. · Drink plenty of fluids, enough so that your urine is light yellow or clear like water. If you have kidney, heart, or liver disease and have to limit fluids, talk with your doctor before you increase the amount of fluids you drink. When should you call for help? Call 911 anytime you think you may need emergency care. For example, call if:    · You have signs of severe dehydration, such as:  ? You are confused or unable to stay awake.  ? You passed out (lost consciousness). Call your doctor now or seek immediate medical care if:    · You still have signs of dehydration. You have sunken eyes and a dry mouth, and you pass only a little dark urine.     · You are dizzy or lightheaded, or you feel like you may faint.     · You are not able to keep down fluids. Watch closely for changes in your health, and be sure to contact your doctor if:    · You do not get better as expected. Where can you learn more? Go to https://WizelinepeTaxiPixi.[employer]. org and sign in to your [employer] account. Enter I040 in the [place] box to learn more about \"Oral Rehydration: Care Instructions. \"     If you do not have an account, please click on the \"Sign Up Now\" link. Current as of: June 26, 2019               Content Version: 12.6  © 3464-0553 [employer]. Care instructions adapted under license by [facility] ([facility]). If you have questions about a medical condition or this instruction, always ask your healthcare professional. [place] [address] any warranty or liability for your use of this information.

## 2021-02-25 ENCOUNTER — NURSE ONLY (OUTPATIENT)
Dept: LAB | Age: 46
End: 2021-02-25

## 2021-02-25 LAB
ALBUMIN SERPL-MCNC: 4.2 G/DL (ref 3.5–5.1)
ALT SERPL-CCNC: 14 U/L (ref 11–66)
BASOPHILS # BLD: 0.3 %
BASOPHILS ABSOLUTE: 0 THOU/MM3 (ref 0–0.1)
CREAT SERPL-MCNC: 0.5 MG/DL (ref 0.4–1.2)
EOSINOPHIL # BLD: 1 %
EOSINOPHILS ABSOLUTE: 0.1 THOU/MM3 (ref 0–0.4)
ERYTHROCYTE [DISTWIDTH] IN BLOOD BY AUTOMATED COUNT: 15.9 % (ref 11.5–14.5)
ERYTHROCYTE [DISTWIDTH] IN BLOOD BY AUTOMATED COUNT: 51.1 FL (ref 35–45)
GFR SERPL CREATININE-BSD FRML MDRD: > 90 ML/MIN/1.73M2
HCT VFR BLD CALC: 37.8 % (ref 37–47)
HEMOGLOBIN: 11.7 GM/DL (ref 12–16)
IMMATURE GRANS (ABS): 0.03 THOU/MM3 (ref 0–0.07)
IMMATURE GRANULOCYTES: 0.2 %
LYMPHOCYTES # BLD: 23.3 %
LYMPHOCYTES ABSOLUTE: 2.9 THOU/MM3 (ref 1–4.8)
MCH RBC QN AUTO: 27.4 PG (ref 26–33)
MCHC RBC AUTO-ENTMCNC: 31 GM/DL (ref 32.2–35.5)
MCV RBC AUTO: 88.5 FL (ref 81–99)
MONOCYTES # BLD: 6.9 %
MONOCYTES ABSOLUTE: 0.9 THOU/MM3 (ref 0.4–1.3)
NUCLEATED RED BLOOD CELLS: 0 /100 WBC
PLATELET # BLD: 383 THOU/MM3 (ref 130–400)
PMV BLD AUTO: 10.3 FL (ref 9.4–12.4)
RBC # BLD: 4.27 MILL/MM3 (ref 4.2–5.4)
SEDIMENTATION RATE, ERYTHROCYTE: 21 MM/HR (ref 0–20)
SEG NEUTROPHILS: 68.3 %
SEGMENTED NEUTROPHILS ABSOLUTE COUNT: 8.6 THOU/MM3 (ref 1.8–7.7)
WBC # BLD: 12.6 THOU/MM3 (ref 4.8–10.8)

## 2021-05-06 ENCOUNTER — NURSE ONLY (OUTPATIENT)
Dept: LAB | Age: 46
End: 2021-05-06

## 2021-05-06 LAB
ALBUMIN SERPL-MCNC: 4.1 G/DL (ref 3.5–5.1)
ALT SERPL-CCNC: 17 U/L (ref 11–66)
BASOPHILS # BLD: 0.3 %
BASOPHILS ABSOLUTE: 0 THOU/MM3 (ref 0–0.1)
CREAT SERPL-MCNC: 0.5 MG/DL (ref 0.4–1.2)
EOSINOPHIL # BLD: 1.3 %
EOSINOPHILS ABSOLUTE: 0.2 THOU/MM3 (ref 0–0.4)
ERYTHROCYTE [DISTWIDTH] IN BLOOD BY AUTOMATED COUNT: 15.6 % (ref 11.5–14.5)
ERYTHROCYTE [DISTWIDTH] IN BLOOD BY AUTOMATED COUNT: 49.8 FL (ref 35–45)
GFR SERPL CREATININE-BSD FRML MDRD: > 90 ML/MIN/1.73M2
HCT VFR BLD CALC: 37.9 % (ref 37–47)
HEMOGLOBIN: 11.9 GM/DL (ref 12–16)
IMMATURE GRANS (ABS): 0.04 THOU/MM3 (ref 0–0.07)
IMMATURE GRANULOCYTES: 0.3 %
LYMPHOCYTES # BLD: 25.1 %
LYMPHOCYTES ABSOLUTE: 2.9 THOU/MM3 (ref 1–4.8)
MCH RBC QN AUTO: 27.9 PG (ref 26–33)
MCHC RBC AUTO-ENTMCNC: 31.4 GM/DL (ref 32.2–35.5)
MCV RBC AUTO: 88.8 FL (ref 81–99)
MONOCYTES # BLD: 5.4 %
MONOCYTES ABSOLUTE: 0.6 THOU/MM3 (ref 0.4–1.3)
NUCLEATED RED BLOOD CELLS: 0 /100 WBC
PLATELET # BLD: 358 THOU/MM3 (ref 130–400)
PMV BLD AUTO: 10.1 FL (ref 9.4–12.4)
RBC # BLD: 4.27 MILL/MM3 (ref 4.2–5.4)
SEDIMENTATION RATE, ERYTHROCYTE: 22 MM/HR (ref 0–20)
SEG NEUTROPHILS: 67.6 %
SEGMENTED NEUTROPHILS ABSOLUTE COUNT: 7.9 THOU/MM3 (ref 1.8–7.7)
WBC # BLD: 11.7 THOU/MM3 (ref 4.8–10.8)

## 2021-06-28 ENCOUNTER — VIRTUAL VISIT (OUTPATIENT)
Dept: FAMILY MEDICINE CLINIC | Age: 46
End: 2021-06-28
Payer: COMMERCIAL

## 2021-06-28 DIAGNOSIS — R30.0 DYSURIA: Primary | ICD-10-CM

## 2021-06-28 PROCEDURE — 1036F TOBACCO NON-USER: CPT | Performed by: NURSE PRACTITIONER

## 2021-06-28 PROCEDURE — 99213 OFFICE O/P EST LOW 20 MIN: CPT | Performed by: NURSE PRACTITIONER

## 2021-06-28 PROCEDURE — G8417 CALC BMI ABV UP PARAM F/U: HCPCS | Performed by: NURSE PRACTITIONER

## 2021-06-28 PROCEDURE — G8428 CUR MEDS NOT DOCUMENT: HCPCS | Performed by: NURSE PRACTITIONER

## 2021-06-28 RX ORDER — CEFDINIR 300 MG/1
300 CAPSULE ORAL 2 TIMES DAILY
Qty: 14 CAPSULE | Refills: 0 | Status: SHIPPED | OUTPATIENT
Start: 2021-06-28 | End: 2021-07-05

## 2021-06-28 RX ORDER — FLUCONAZOLE 100 MG/1
100 TABLET ORAL DAILY
Qty: 7 TABLET | Refills: 0 | Status: SHIPPED | OUTPATIENT
Start: 2021-06-28 | End: 2021-07-05

## 2021-06-28 ASSESSMENT — ENCOUNTER SYMPTOMS
EYES NEGATIVE: 1
SHORTNESS OF BREATH: 0
GASTROINTESTINAL NEGATIVE: 1
CHEST TIGHTNESS: 0
ALLERGIC/IMMUNOLOGIC NEGATIVE: 1
WHEEZING: 0
COUGH: 0

## 2021-06-28 NOTE — PROGRESS NOTES
[address]  Dept: BRUNA Coyne (:  1975) is a 39 y.o. female,Established patient, here for evaluation of the following chief complaint(s):  No chief complaint on file. ASSESSMENT/PLAN:  I have reviewed the patient's medical history in detail and updated the computerized patient record. HPI/ROS per the patient and caregiver. Overall non toxic in appearance. Answers questions appropriately. Conditions discussed and addressed this visit include:     Rhea Medrano, was evaluated through a synchronous (real-time) audio-video encounter. The patient (or guardian if applicable) is aware that this is a billable service. Verbal consent to proceed has been obtained within the past 12 months. The visit was conducted pursuant to the emergency declaration under the [address] [place] [address], [address] [place] [address] authority and the [employer] and [employer] Act. Patient identification was verified, and a caregiver was present when appropriate. The patient was located in a state where the provider was credentialed to provide care. Total time spent for this encounter: 7 min    --THU Bonds CNP on 2021 at 9:54 AM    An electronic signature was used to authenticate this note. 1. Dysuria  -     fluconazole (DIFLUCAN) 100 MG tablet; Take 1 tablet by mouth daily for 7 days, Disp-7 tablet, R-0Normal  -     cefdinir (OMNICEF) 300 MG capsule; Take 1 capsule by mouth 2 times daily for 7 days, Disp-14 capsule, R-0Normal      Return if symptoms worsen or fail to improve, for Results review, Routine follow up. SUBJECTIVE/OBJECTIVE:  Dysuria   This is a new problem. The current episode started in the past 7 days. The problem occurs every urination. The problem has been gradually worsening. The quality of the pain is described as aching and shooting. The pain is at a severity of 5/10. The pain is moderate. There has been no fever. She is sexually active. There is no history of pyelonephritis. Associated symptoms include frequency, hesitancy and urgency. She has tried acetaminophen, NSAIDs and increased fluids for the symptoms. The treatment provided no relief.    Review of Systems   Constitutional: Positive for activity change, appetite change and fatigue. HENT: Negative. Eyes: Negative. Respiratory: Negative for cough, chest tightness, shortness of breath and wheezing. Cardiovascular: Negative for chest pain and leg swelling. Gastrointestinal: Negative. Endocrine: Negative for cold intolerance and heat intolerance. Genitourinary: Positive for dysuria, frequency, hesitancy and urgency. Musculoskeletal: Positive for arthralgias and myalgias. Skin: Negative. Allergic/Immunologic: Negative. Neurological: Negative. Hematological: Negative for adenopathy. Does not bruise/bleed easily. Physical Exam  Vitals and nursing note reviewed. Constitutional:       Appearance: Normal appearance. She is well-developed. She is obese. HENT:      Head: Normocephalic and atraumatic. Right Ear: External ear normal.      Left Ear: External ear normal.      Nose: Nose normal.      Mouth/Throat:      Mouth: Mucous membranes are moist.      Pharynx: No oropharyngeal exudate. Eyes:      Conjunctiva/sclera: Conjunctivae normal.   Neck:      Thyroid: No thyromegaly. Pulmonary:      Effort: Pulmonary effort is normal. No respiratory distress. Breath sounds: No wheezing or rales. Chest:      Chest wall: No tenderness. Abdominal:      General: There is no distension. Tenderness: There is no abdominal tenderness. Musculoskeletal:         General: Normal range of motion. Cervical back: Normal range of motion and neck supple. No muscular tenderness. Skin:     General: Skin is dry. Coloration: Skin is not pale.    Neurological:      General: No focal deficit present. Mental Status: She is alert and oriented to person, place, and time. Cranial Nerves: No cranial nerve deficit. Motor: No abnormal muscle tone. Coordination: Coordination normal.      Deep Tendon Reflexes: Reflexes are normal and symmetric. Reflexes normal.   Psychiatric:         Behavior: Behavior normal.         Thought Content: Thought content normal.         Judgment: Judgment normal.                 An electronic signature was used to authenticate this note.     --THU Bonds - CNP

## 2021-07-07 ENCOUNTER — NURSE ONLY (OUTPATIENT)
Dept: LAB | Age: 46
End: 2021-07-07

## 2021-07-07 LAB
ALBUMIN SERPL-MCNC: 4.2 G/DL (ref 3.5–5.1)
ALT SERPL-CCNC: 16 U/L (ref 11–66)
BASOPHILS # BLD: 0.5 %
BASOPHILS ABSOLUTE: 0 THOU/MM3 (ref 0–0.1)
CREAT SERPL-MCNC: 0.5 MG/DL (ref 0.4–1.2)
EOSINOPHIL # BLD: 1.4 %
EOSINOPHILS ABSOLUTE: 0.1 THOU/MM3 (ref 0–0.4)
ERYTHROCYTE [DISTWIDTH] IN BLOOD BY AUTOMATED COUNT: 15.3 % (ref 11.5–14.5)
ERYTHROCYTE [DISTWIDTH] IN BLOOD BY AUTOMATED COUNT: 48.6 FL (ref 35–45)
GFR SERPL CREATININE-BSD FRML MDRD: > 90 ML/MIN/1.73M2
HCT VFR BLD CALC: 38.2 % (ref 37–47)
HEMOGLOBIN: 12.2 GM/DL (ref 12–16)
IMMATURE GRANS (ABS): 0.03 THOU/MM3 (ref 0–0.07)
IMMATURE GRANULOCYTES: 0.3 %
LYMPHOCYTES # BLD: 28.3 %
LYMPHOCYTES ABSOLUTE: 2.8 THOU/MM3 (ref 1–4.8)
MCH RBC QN AUTO: 28.3 PG (ref 26–33)
MCHC RBC AUTO-ENTMCNC: 31.9 GM/DL (ref 32.2–35.5)
MCV RBC AUTO: 88.6 FL (ref 81–99)
MONOCYTES # BLD: 7.9 %
MONOCYTES ABSOLUTE: 0.8 THOU/MM3 (ref 0.4–1.3)
NUCLEATED RED BLOOD CELLS: 0 /100 WBC
PLATELET # BLD: 370 THOU/MM3 (ref 130–400)
PMV BLD AUTO: 10.4 FL (ref 9.4–12.4)
RBC # BLD: 4.31 MILL/MM3 (ref 4.2–5.4)
SEDIMENTATION RATE, ERYTHROCYTE: 25 MM/HR (ref 0–20)
SEG NEUTROPHILS: 61.6 %
SEGMENTED NEUTROPHILS ABSOLUTE COUNT: 6.1 THOU/MM3 (ref 1.8–7.7)
WBC # BLD: 9.9 THOU/MM3 (ref 4.8–10.8)

## 2021-07-09 NOTE — PROGRESS NOTES
[facility]      Frannie Smoker         [id number]  7/12/2021   Chief Complaint   Patient presents with    Follow-up     VICKY 1 year with download         Pt of Dr. Duran GARCIA Download:   Original or initial AHI: 23.0     Date of initial study: 3/13/2010      Compliant  100%     Noncompliant 0 %     PAP Type CPap-C-flexLevel  13   Avg Hrs/Day 7 hr 28 min 4 sec  AHI: 3.4   Recorded compliance dates , 6/7/2021  to 7/6/2021   Machine/Mfg:   [] ResMed    [x] Respironics/Dreamstation   Interface:   [] Nasal    [] Nasal pillows   [x] FFM      Provider:      [x] -FDAIA     []Yumiko     [] Stephanie    [] Kathy Shore    [] [place]               [] [facility]      [] Adaptive    [] [address]      [] Other    Neck Size: 19  Mallampati Mallampati 2  ESS:  8  SAQLI: 87    Here is a scan of the most recent download:          Presentation:   Bonnie Lemon presents for sleep medicine follow up for obstructive sleep apnea  Since the last visit, Bonnie Lemon is doing well with PAP. She is sleeping well and feels rested. Equipment issues: The pressure is  acceptable, the mask is acceptable     Sleep issues:  Do you feel better? Yes  More rested? Yes   Better concentration? yes    Progress History:   Since last visit any new medical issues? No  New ER or hospital visits? No  Any new or changes in medicines? No  Any new sleep medicines? No    Review of Systems -   Review of Systems   Constitutional: Negative for activity change, appetite change, chills and fever. HENT: Negative for congestion and postnasal drip. Eyes: Negative. Respiratory: Negative for cough, chest tightness, shortness of breath, wheezing and stridor. Cardiovascular: Negative for chest pain and leg swelling. Gastrointestinal: Negative for diarrhea and nausea. Endocrine: Negative. Genitourinary: Negative. Musculoskeletal: Negative. Negative for arthralgias and back pain. Skin: Negative. Allergic/Immunologic: Negative. Neurological: Negative. Negative for dizziness and light-headedness. Psychiatric/Behavioral: Negative. All other systems reviewed and are negative. Physical Exam:    BMI:  Body mass index is 48.93 kg/m². Wt Readings from Last 3 Encounters:   07/12/21 (!) 325 lb 9.6 oz (147.7 kg)   12/29/20 (!) 326 lb (147.9 kg)   08/04/20 (!) 338 lb (153.3 kg)     Weight lost 13 lbs over 1 year  Vitals: /80 (Site: Left Upper Arm, Position: Sitting, Cuff Size: Large Adult)   Pulse 89   Temp 97.6 °F (36.4 °C) (Temporal)   Ht 5' 8.4\" (1.737 m)   Wt (!) 325 lb 9.6 oz (147.7 kg)   SpO2 99% Comment: rm air at rest  BMI 48.93 kg/m²       Physical Exam  Constitutional:       Appearance: She is well-developed. HENT:      Head: Normocephalic and atraumatic. Right Ear: External ear normal.      Left Ear: External ear normal.   Eyes:      Conjunctiva/sclera: Conjunctivae normal.      Pupils: Pupils are equal, round, and reactive to light. Cardiovascular:      Rate and Rhythm: Normal rate and regular rhythm. Heart sounds: Normal heart sounds. Pulmonary:      Effort: Pulmonary effort is normal.      Breath sounds: Normal breath sounds. Musculoskeletal:         General: Normal range of motion. Cervical back: Normal range of motion and neck supple. Skin:     General: Skin is warm and dry. Neurological:      Mental Status: She is alert and oriented to person, place, and time. Psychiatric:         Behavior: Behavior normal.         Thought Content: Thought content normal.         Judgment: Judgment normal.           ASSESSMENT/DIAGNOSIS     Diagnosis Orders   1. Obstructive sleep apnea on CPAP     2. Morbid obesity with BMI of 45.0-49.9, adult [facility])              Plan   Do you need any equipment today?  Yes update supplies  - Discussed the recall, she is on the list for replacement   - Download reviewed and discussed with patient  - She  was advised to continue current positive airway pressure therapy with above described pressure. - She  advised to keep good compliance with current recommended pressure to get optimal results and clinical improvement  - Recommend 7-9 hours of sleep with PAP  - She was advised to call [employer] company regarding supplies if needed.   -She call my office for earlier appointment if needed for worsening of sleep symptoms.   - She was instructed on weight loss  - Nathaniel Barraza was educated about my impression and plan. Patient verbalizesunderstanding.   We will see Jerzy Lepe back in: 1 year with download    Information added by my medical assistant/LPN was reviewed today           Hayes Baxter PA-C, MPAS  7/12/2021

## 2021-07-12 ENCOUNTER — OFFICE VISIT (OUTPATIENT)
Dept: PULMONOLOGY | Age: 46
End: 2021-07-12
Payer: COMMERCIAL

## 2021-07-12 VITALS
HEART RATE: 89 BPM | BODY MASS INDEX: 44.41 KG/M2 | TEMPERATURE: 97.6 F | SYSTOLIC BLOOD PRESSURE: 128 MMHG | DIASTOLIC BLOOD PRESSURE: 80 MMHG | HEIGHT: 68 IN | OXYGEN SATURATION: 99 % | WEIGHT: 293 LBS

## 2021-07-12 DIAGNOSIS — Z99.89 OBSTRUCTIVE SLEEP APNEA ON CPAP: Primary | ICD-10-CM

## 2021-07-12 DIAGNOSIS — E66.01 MORBID OBESITY WITH BMI OF 45.0-49.9, ADULT (HCC): ICD-10-CM

## 2021-07-12 DIAGNOSIS — G47.33 OBSTRUCTIVE SLEEP APNEA ON CPAP: Primary | ICD-10-CM

## 2021-07-12 PROCEDURE — 1036F TOBACCO NON-USER: CPT | Performed by: PHYSICIAN ASSISTANT

## 2021-07-12 PROCEDURE — 99213 OFFICE O/P EST LOW 20 MIN: CPT | Performed by: PHYSICIAN ASSISTANT

## 2021-07-12 PROCEDURE — G8427 DOCREV CUR MEDS BY ELIG CLIN: HCPCS | Performed by: PHYSICIAN ASSISTANT

## 2021-07-12 PROCEDURE — G8417 CALC BMI ABV UP PARAM F/U: HCPCS | Performed by: PHYSICIAN ASSISTANT

## 2021-07-12 ASSESSMENT — ENCOUNTER SYMPTOMS
NAUSEA: 0
SHORTNESS OF BREATH: 0
STRIDOR: 0
DIARRHEA: 0
WHEEZING: 0
CHEST TIGHTNESS: 0
BACK PAIN: 0
EYES NEGATIVE: 1
COUGH: 0
ALLERGIC/IMMUNOLOGIC NEGATIVE: 1

## 2021-07-23 ENCOUNTER — NURSE ONLY (OUTPATIENT)
Dept: LAB | Age: 46
End: 2021-07-23

## 2021-07-23 LAB
ERYTHROCYTE [DISTWIDTH] IN BLOOD BY AUTOMATED COUNT: 15.1 % (ref 11.5–14.5)
ERYTHROCYTE [DISTWIDTH] IN BLOOD BY AUTOMATED COUNT: 15.2 % (ref 11.5–14.5)
ERYTHROCYTE [DISTWIDTH] IN BLOOD BY AUTOMATED COUNT: 48 FL (ref 35–45)
ERYTHROCYTE [DISTWIDTH] IN BLOOD BY AUTOMATED COUNT: 48.7 FL (ref 35–45)
HCT VFR BLD CALC: 38 % (ref 37–47)
HCT VFR BLD CALC: 38.3 % (ref 37–47)
HEMOGLOBIN: 11.9 GM/DL (ref 12–16)
HEMOGLOBIN: 11.9 GM/DL (ref 12–16)
MCH RBC QN AUTO: 27.7 PG (ref 26–33)
MCH RBC QN AUTO: 27.9 PG (ref 26–33)
MCHC RBC AUTO-ENTMCNC: 31.1 GM/DL (ref 32.2–35.5)
MCHC RBC AUTO-ENTMCNC: 31.3 GM/DL (ref 32.2–35.5)
MCV RBC AUTO: 89.1 FL (ref 81–99)
MCV RBC AUTO: 89.2 FL (ref 81–99)
PLATELET # BLD: 362 THOU/MM3 (ref 130–400)
PLATELET # BLD: 367 THOU/MM3 (ref 130–400)
PMV BLD AUTO: 10 FL (ref 9.4–12.4)
PMV BLD AUTO: 10.3 FL (ref 9.4–12.4)
RBC # BLD: 4.26 MILL/MM3 (ref 4.2–5.4)
RBC # BLD: 4.3 MILL/MM3 (ref 4.2–5.4)
TSH SERPL DL<=0.05 MIU/L-ACNC: 2.19 UIU/ML (ref 0.4–4.2)
WBC # BLD: 8.7 THOU/MM3 (ref 4.8–10.8)
WBC # BLD: 9.2 THOU/MM3 (ref 4.8–10.8)

## 2021-07-24 LAB — INSULIN, RANDOM OR FASTING: 41.2 MU/L

## 2021-08-05 ENCOUNTER — HOSPITAL ENCOUNTER (OUTPATIENT)
Age: 46
Discharge: HOME | End: 2021-08-05
Payer: COMMERCIAL

## 2021-08-05 LAB
EKG ATRIAL RATE: 71 BPM
EKG P AXIS: 52 DEGREES
EKG P-R INTERVAL: 142 MS
EKG Q-T INTERVAL: 432 MS
EKG QRS DURATION: 88 MS
EKG QTC CALCULATION (BAZETT): 469 MS
EKG R AXIS: 11 DEGREES
EKG T AXIS: 25 DEGREES
EKG VENTRICULAR RATE: 71 BPM

## 2021-08-05 PROCEDURE — 93005 ELECTROCARDIOGRAM TRACING: CPT | Performed by: OBSTETRICS & GYNECOLOGY

## 2021-08-09 ENCOUNTER — OFFICE VISIT (OUTPATIENT)
Dept: FAMILY MEDICINE CLINIC | Age: 46
End: 2021-08-09
Payer: COMMERCIAL

## 2021-08-09 VITALS
HEART RATE: 86 BPM | OXYGEN SATURATION: 98 % | WEIGHT: 293 LBS | BODY MASS INDEX: 49.35 KG/M2 | SYSTOLIC BLOOD PRESSURE: 128 MMHG | TEMPERATURE: 97.9 F | DIASTOLIC BLOOD PRESSURE: 78 MMHG

## 2021-08-09 DIAGNOSIS — R60.1 GENERALIZED EDEMA: ICD-10-CM

## 2021-08-09 DIAGNOSIS — I10 ESSENTIAL HYPERTENSION: Primary | ICD-10-CM

## 2021-08-09 DIAGNOSIS — R94.31 PROLONGATION OF QRS COMPLEX ON ELECTROCARDIOGRAPHY: ICD-10-CM

## 2021-08-09 DIAGNOSIS — F32.89 OTHER DEPRESSION: ICD-10-CM

## 2021-08-09 DIAGNOSIS — E88.81 INSULIN RESISTANCE: ICD-10-CM

## 2021-08-09 LAB — HBA1C MFR BLD: 5.7 % (ref 4.3–5.7)

## 2021-08-09 PROCEDURE — G8427 DOCREV CUR MEDS BY ELIG CLIN: HCPCS | Performed by: NURSE PRACTITIONER

## 2021-08-09 PROCEDURE — 1036F TOBACCO NON-USER: CPT | Performed by: NURSE PRACTITIONER

## 2021-08-09 PROCEDURE — G8417 CALC BMI ABV UP PARAM F/U: HCPCS | Performed by: NURSE PRACTITIONER

## 2021-08-09 PROCEDURE — 99214 OFFICE O/P EST MOD 30 MIN: CPT | Performed by: NURSE PRACTITIONER

## 2021-08-09 PROCEDURE — 83036 HEMOGLOBIN GLYCOSYLATED A1C: CPT | Performed by: NURSE PRACTITIONER

## 2021-08-09 RX ORDER — LEFLUNOMIDE 20 MG/1
20 TABLET ORAL EVERY OTHER DAY
COMMUNITY
End: 2022-07-12

## 2021-08-09 RX ORDER — BUMETANIDE 1 MG/1
1 TABLET ORAL DAILY
Qty: 90 TABLET | Refills: 1 | Status: SHIPPED | OUTPATIENT
Start: 2021-08-09

## 2021-08-09 RX ORDER — SERTRALINE HYDROCHLORIDE 100 MG/1
100 TABLET, FILM COATED ORAL DAILY
Qty: 90 TABLET | Refills: 3 | Status: SHIPPED | OUTPATIENT
Start: 2021-08-09 | End: 2022-06-29 | Stop reason: SDUPTHER

## 2021-08-09 RX ORDER — ALBUTEROL SULFATE 90 UG/1
2 AEROSOL, METERED RESPIRATORY (INHALATION) 4 TIMES DAILY PRN
Qty: 3 INHALER | Refills: 3 | Status: SHIPPED | OUTPATIENT
Start: 2021-08-09 | End: 2022-06-29 | Stop reason: SDUPTHER

## 2021-08-09 RX ORDER — POTASSIUM CHLORIDE 20 MEQ/1
20 TABLET, EXTENDED RELEASE ORAL DAILY
Qty: 90 TABLET | Refills: 1 | Status: SHIPPED | OUTPATIENT
Start: 2021-08-09

## 2021-08-09 SDOH — ECONOMIC STABILITY: FOOD INSECURITY: WITHIN THE PAST 12 MONTHS, THE FOOD YOU BOUGHT JUST DIDN'T LAST AND YOU DIDN'T HAVE MONEY TO GET MORE.: NEVER TRUE

## 2021-08-09 SDOH — ECONOMIC STABILITY: FOOD INSECURITY: WITHIN THE PAST 12 MONTHS, YOU WORRIED THAT YOUR FOOD WOULD RUN OUT BEFORE YOU GOT MONEY TO BUY MORE.: NEVER TRUE

## 2021-08-09 ASSESSMENT — ENCOUNTER SYMPTOMS
GASTROINTESTINAL NEGATIVE: 1
COUGH: 0
ALLERGIC/IMMUNOLOGIC NEGATIVE: 1
EYE ITCHING: 0
CHEST TIGHTNESS: 0
EYE PAIN: 0
EYE REDNESS: 0

## 2021-08-09 ASSESSMENT — SOCIAL DETERMINANTS OF HEALTH (SDOH): HOW HARD IS IT FOR YOU TO PAY FOR THE VERY BASICS LIKE FOOD, HOUSING, MEDICAL CARE, AND HEATING?: NOT HARD AT ALL

## 2021-08-09 NOTE — PROGRESS NOTES
[address]  Dept: BRUNA Coyen (:  1975) is a 39 y.o. female,Established patient, here for evaluation of the following chief complaint(s):  Medication Refill and Eye Problem (right, a spot on the eye lid)      ASSESSMENT/PLAN:  I have reviewed the patient's medical history in detail and updated the computerized patient record. HPI/ROS per the patient and caregiver. Overall non toxic in appearance. Answers questions appropriately. Conditions discussed and addressed this visit include:     Overall unchanged  Lab Results   Component Value Date    LABA1C 5.7 2021     No results found for: EAG  Stable at this time  Pre surgery work up shows an EKG with prolonged qrs complex, sinus rhythm. No ekg to compare to  Will have her complete echo. Follow up with Dr Tyshawn Augustine for further recommendations. Continue bumex for edema. Use prn only  Continue to watch diet and activity level  The patient is advised to begin progressive daily aerobic exercise program, follow a low fat, low cholesterol diet, attempt to lose weight, reduce salt in diet and cooking, reduce exposure to stress, improve dietary compliance, use calcium 1 gram daily with Vit D, continue current medications, continue current healthy lifestyle patterns and return for routine annual checkups. 1. Essential hypertension  2. Insulin resistance  -     POCT glycosylated hemoglobin (Hb A1C)  3. Generalized edema  -     bumetanide (BUMEX) 1 MG tablet; Take 1 tablet by mouth daily, Disp-90 tablet, R-1Normal  -     potassium chloride (KLOR-CON M) 20 MEQ extended release tablet; Take 1 tablet by mouth daily, Disp-90 tablet, R-1Normal  4. Prolongation of QRS complex on electrocardiography  -     ECHO Complete 2D W Doppler W Color; Future  5. Other depression  -     sertraline (ZOLOFT) 100 MG tablet;  Take 1 tablet by mouth daily, Disp-90 tablet, R-3Normal      Return in about 6 months (around 2/9/2022) for Medication evaluation, Results review, Routine follow up. SUBJECTIVE/OBJECTIVE:  HPI   Here for 6 month follow up   Change in rheumatoid meds again. Tolerating well at this time   Joints continue to swell and be painful, mostly  Hands and knees   Blood pressure stable   Denies cp    Has sob and wheezing at times placido with weather changes   Due to have D/C for fibroids. Work up completed  [employer] changes on work up  [place]   Review of Systems   Constitutional: Negative for activity change, appetite change, fatigue and fever. HENT: Negative. Eyes: Negative for pain, redness and itching. Respiratory: Positive for shortness of breath and wheezing. Negative for cough and chest tightness. Cardiovascular: Positive for leg swelling. Negative for chest pain and palpitations. Gastrointestinal: Negative. Endocrine: Negative for cold intolerance. Genitourinary: Negative. Musculoskeletal: Positive for arthralgias and joint swelling. Negative for myalgias. Skin: Negative. Allergic/Immunologic: Negative. Neurological: Negative for dizziness, weakness and headaches. Hematological: Negative for adenopathy. Does not bruise/bleed easily. Psychiatric/Behavioral: Negative. Physical Exam  Vitals and nursing note reviewed. Constitutional:       Appearance: She is obese. She is not ill-appearing. HENT:      Head: Normocephalic. Right Ear: Tympanic membrane, ear canal and external ear normal.      Left Ear: Tympanic membrane, ear canal and external ear normal.      Nose: Nose normal. No rhinorrhea. Mouth/Throat:      Mouth: Mucous membranes are moist.      Pharynx: No posterior oropharyngeal erythema. Eyes:      Conjunctiva/sclera: Conjunctivae normal.   Neck:      Vascular: No carotid bruit. Cardiovascular:      Rate and Rhythm: Normal rate and regular rhythm. Pulses: Normal pulses. Heart sounds: Normal heart sounds.    Pulmonary: Effort: Pulmonary effort is normal.      Breath sounds: Normal breath sounds. No wheezing, rhonchi or rales. Abdominal:      General: Abdomen is flat. Bowel sounds are normal.   Musculoskeletal:         General: Normal range of motion. Cervical back: Normal range of motion and neck supple. No muscular tenderness. Right lower leg: Edema present. Left lower leg: Edema ( mild where socks had been) present. Skin:     General: Skin is warm and dry. Capillary Refill: Capillary refill takes less than 2 seconds. Coloration: Skin is not pale. Neurological:      General: No focal deficit present. Mental Status: She is alert and oriented to person, place, and time. Mental status is at baseline. Psychiatric:         Mood and Affect: Mood normal. Affect is tearful ( when speaking of son and his needs for school). Speech: Speech normal.         Behavior: Behavior normal. Behavior is cooperative. Thought Content: Thought content normal.         Cognition and Memory: Cognition normal.         Judgment: Judgment normal.                 An electronic signature was used to authenticate this note.     --THU Caraballo - CNP

## 2021-08-09 NOTE — PATIENT INSTRUCTIONS
Patient Education        Well Visit, Ages 25 to 48: Care Instructions  Overview     Well visits can help you stay healthy. Your doctor has checked your overall health and may have suggested ways to take good care of yourself. Your doctor also may have recommended tests. At home, you can help prevent illness with healthy eating, regular exercise, and other steps. Follow-up care is a key part of your treatment and safety. Be sure to make and go to all appointments, and call your doctor if you are having problems. It's also a good idea to know your test results and keep a list of the medicines you take. How can you care for yourself at home? · Get screening tests that you and your doctor decide on. Screening helps find diseases before any symptoms appear. · Eat healthy foods. Choose fruits, vegetables, whole grains, protein, and low-fat dairy foods. Limit fat, especially saturated fat. Reduce salt in your diet. · Limit alcohol. If you are a man, have no more than 2 drinks a day or 14 drinks a week. If you are a woman, have no more than 1 drink a day or 7 drinks a week. · Get at least 30 minutes of physical activity on most days of the week. Walking is a good choice. You also may want to do other activities, such as running, swimming, cycling, or playing tennis or team sports. Discuss any changes in your exercise program with your doctor. · Reach and stay at a healthy weight. This will lower your risk for many problems, such as obesity, diabetes, heart disease, and high blood pressure. · Do not smoke or allow others to smoke around you. If you need help quitting, talk to your doctor about stop-smoking programs and medicines. These can increase your chances of quitting for good. · Care for your mental health. It is easy to get weighed down by worry and stress. Learn strategies to manage stress, like deep breathing and mindfulness, and stay connected with your family and community.  If you find you often feel sad or hopeless, talk with your doctor. Treatment can help. · Talk to your doctor about whether you have any risk factors for sexually transmitted infections (STIs). You can help prevent STIs if you wait to have sex with a new partner (or partners) until you've each been tested for STIs. It also helps if you use condoms (male or female condoms) and if you limit your sex partners to one person who only has sex with you. Vaccines are available for some STIs, such as HPV. · Use birth control if it's important to you to prevent pregnancy. Talk with your doctor about the choices available and what might be best for you. · If you think you may have a problem with alcohol or drug use, talk to your doctor. This includes prescription medicines (such as amphetamines and opioids) and illegal drugs (such as cocaine and methamphetamine). Your doctor can help you figure out what type of treatment is best for you. · Protect your skin from too much sun. When you're outdoors from 10 a.m. to 4 p.m., stay in the shade or cover up with clothing and a hat with a wide brim. Wear sunglasses that block UV rays. Even when it's cloudy, put broad-spectrum sunscreen (SPF 30 or higher) on any exposed skin. · See a dentist one or two times a year for checkups and to have your teeth cleaned. · Wear a seat belt in the car. When should you call for help? Watch closely for changes in your health, and be sure to contact your doctor if you have any problems or symptoms that concern you. Where can you learn more? Go to https://[employer]. org and sign in to your [employer] account. Enter P072 in the [employer] box to learn more about \"Well Visit, Ages 25 to 48: Care Instructions. \"     If you do not have an account, please click on the \"Sign Up Now\" link. Current as of: May 27, 2020               Content Version: 12.9  © 3226-4265 Healthwise, Incorporated. Care instructions adapted under license by [facility] [address] If you have questions about a medical condition or this instruction, always ask your healthcare professional. [address] any warranty or liability for your use of this information. Patient Education        Learning About Carbohydrate (Carb) Counting and Eating Out When You Have Diabetes  Why plan your meals? Meal planning can be a key part of managing diabetes. Planning meals and snacks with the right balance of carbohydrate, protein, and fat can help you keep your blood sugar at the target level you set with your doctor. You don't have to eat special foods. You can eat what your family eats, including sweets once in a while. But you do have to pay attention to how often you eat and how much you eat of certain foods. You may want to work with a dietitian or a certified diabetes educator. He or she can give you tips and meal ideas and can answer your questions about meal planning. This health professional can also help you reach a healthy weight if that is one of your goals. What should you know about eating carbs? Managing the amount of carbohydrate (carbs) you eat is an important part of healthy meals when you have diabetes. Carbohydrate is found in many foods. · Learn which foods have carbs. And learn the amounts of carbs in different foods. ? Bread, cereal, pasta, and rice have about 15 grams of carbs in a serving. A serving is 1 slice of bread (1 ounce), ½ cup of cooked cereal, or 1/3 cup of cooked pasta or rice. ? Fruits have 15 grams of carbs in a serving. A serving is 1 small fresh fruit, such as an apple or orange; ½ of a banana; ½ cup of cooked or canned fruit; ½ cup of fruit juice; 1 cup of melon or raspberries; or 2 tablespoons of dried fruit. ? Milk and no-sugar-added yogurt have 15 grams of carbs in a serving. A serving is 1 cup of milk or 2/3 cup of no-sugar-added yogurt. ? Starchy vegetables have 15 grams of carbs in a serving.  A serving is ½ cup of mashed potatoes or sweet potato; 1 cup winter squash; ½ of a small baked potato; ½ cup of cooked beans; or ½ cup cooked corn or green peas. · Learn how much carbs to eat each day and at each meal. A dietitian or CDE can teach you how to keep track of the amount of carbs you eat. This is called carbohydrate counting. · If you are not sure how to count carbohydrate grams, use the Plate Method to plan meals. It is a good, quick way to make sure that you have a balanced meal. It also helps you spread carbs throughout the day. ? Divide your plate by types of foods. Put non-starchy vegetables on half the plate, meat or other protein food on one-quarter of the plate, and a grain or starchy vegetable in the final quarter of the plate. To this you can add a small piece of fruit and 1 cup of milk or yogurt, depending on how many carbs you are supposed to eat at a meal.  · Try to eat about the same amount of carbs at each meal. Do not \"save up\" your daily allowance of carbs to eat at one meal.  · Proteins have very little or no carbs per serving. Examples of proteins are beef, chicken, turkey, fish, eggs, tofu, cheese, cottage cheese, and peanut butter. A serving size of meat is 3 ounces, which is about the size of a deck of cards. Examples of meat substitute serving sizes (equal to 1 ounce of meat) are 1/4 cup of cottage cheese, 1 egg, 1 tablespoon of peanut butter, and ½ cup of tofu. How can you eat out and still eat healthy? · Learn to estimate the serving sizes of foods that have carbohydrate. If you measure food at home, it will be easier to estimate the amount in a serving of restaurant food. · If the meal you order has too much carbohydrate (such as potatoes, corn, or baked beans), ask to have a low-carbohydrate food instead. Ask for a salad or green vegetables. · If you use insulin, check your blood sugar before and after eating out to help you plan how much to eat in the future.   · If you eat more carbohydrate at a meal than you had planned, take a walk or do other exercise. This will help lower your blood sugar. What are some tips for eating healthy? · Limit saturated fat, such as the fat from meat and dairy products. This is a healthy choice because people who have diabetes are at higher risk of heart disease. So choose lean cuts of meat and nonfat or low-fat dairy products. Use olive or canola oil instead of butter or shortening when cooking. · Don't skip meals. Your blood sugar may drop too low if you skip meals and take insulin or certain medicines for diabetes. · Check with your doctor before you drink alcohol. Alcohol can cause your blood sugar to drop too low. Alcohol can also cause a bad reaction if you take certain diabetes medicines. Follow-up care is a key part of your treatment and safety. Be sure to make and go to all appointments, and call your doctor if you are having problems. It's also a good idea to know your test results and keep a list of the medicines you take. Where can you learn more? Go to https://[employer]. org and sign in to your [employer] account. Enter L783 in the [employer] box to learn more about \"Learning About Carbohydrate (Carb) Counting and Eating Out When You Have Diabetes. \"     If you do not have an account, please click on the \"Sign Up Now\" link. Current as of: August 31, 2020               Content Version: 12.9  © 4223-6270 Healthwise, Incorporated. Care instructions adapted under license by [facility] ([facility]). If you have questions about a medical condition or this instruction, always ask your healthcare professional. [address] any warranty or liability for your use of this information.

## 2021-08-10 PROBLEM — R60.1 GENERALIZED EDEMA: Status: ACTIVE | Noted: 2021-08-10

## 2021-08-10 PROBLEM — R94.31 PROLONGATION OF QRS COMPLEX ON ELECTROCARDIOGRAPHY: Status: ACTIVE | Noted: 2021-08-10

## 2021-08-10 ASSESSMENT — ENCOUNTER SYMPTOMS
WHEEZING: 1
SHORTNESS OF BREATH: 1

## 2021-08-16 ENCOUNTER — HOSPITAL ENCOUNTER (OUTPATIENT)
Dept: NON INVASIVE DIAGNOSTICS | Age: 46
Discharge: HOME | End: 2021-08-16
Payer: COMMERCIAL

## 2021-08-16 DIAGNOSIS — R94.31 PROLONGATION OF QRS COMPLEX ON ELECTROCARDIOGRAPHY: ICD-10-CM

## 2021-08-16 LAB
LV EF: 58 %
LVEF MODALITY: NORMAL

## 2021-08-16 PROCEDURE — 93306 TTE W/DOPPLER COMPLETE: CPT

## 2021-08-17 ENCOUNTER — TELEPHONE (OUTPATIENT)
Dept: FAMILY MEDICINE CLINIC | Age: 46
End: 2021-08-17

## 2021-08-17 NOTE — TELEPHONE ENCOUNTER
I called and spoke to the patient. I let her know Gina's response regarding her Echo results. The patient said that she has not seen her OB yet. That appointment is on Monday.

## 2021-08-17 NOTE — TELEPHONE ENCOUNTER
----- Message from THU Hart CNP sent at 8/17/2021  4:17 PM EDT -----  Echo is normal at this time. No structural issues. Valves are normal. Did Dr Griselda Karvonen recommend stress test? We can order if she wants.

## 2021-08-19 ENCOUNTER — OFFICE VISIT (OUTPATIENT)
Dept: CARDIOLOGY CLINIC | Age: 46
End: 2021-08-19
Payer: COMMERCIAL

## 2021-08-19 VITALS
HEIGHT: 68 IN | BODY MASS INDEX: 44.41 KG/M2 | SYSTOLIC BLOOD PRESSURE: 112 MMHG | HEART RATE: 78 BPM | WEIGHT: 293 LBS | DIASTOLIC BLOOD PRESSURE: 72 MMHG

## 2021-08-19 DIAGNOSIS — Z01.810 PREOP CARDIOVASCULAR EXAM: Primary | ICD-10-CM

## 2021-08-19 PROCEDURE — G8417 CALC BMI ABV UP PARAM F/U: HCPCS | Performed by: INTERNAL MEDICINE

## 2021-08-19 PROCEDURE — G8427 DOCREV CUR MEDS BY ELIG CLIN: HCPCS | Performed by: INTERNAL MEDICINE

## 2021-08-19 PROCEDURE — 1036F TOBACCO NON-USER: CPT | Performed by: INTERNAL MEDICINE

## 2021-08-19 PROCEDURE — 99204 OFFICE O/P NEW MOD 45 MIN: CPT | Performed by: INTERNAL MEDICINE

## 2021-08-19 NOTE — PROGRESS NOTES
[address] [facility] [address]  [place] [address]  Dept: [phone]  Dept Fax: [phone]  Loc: [phone]    Visit Date: 8/19/2021    Ms. David White is a 55 y.o. female  who presented for:  Chief Complaint   Patient presents with    New Patient    Cardiac Clearance       HPI:   56 y/o female with T2 DM, Hypertension, VICKY on CPAP, PCOS and Obesity who presents for cardiac clearance. Patient will have D&C for polyp. Patient admits to occasional SOB which she attributes to asthma. Has inhaler which helps. Patient has some lower extremity edema but has been doing well recently. On Bumex and Lisinopril-hydrochlorothiazide. BP well controlled. Denies chest pain, palpitations lightheadedness, dizziness, orthopnea. EKG and Echo reviewed.          Current Outpatient Medications:     ZINC PO, Take 50 mg by mouth daily, Disp: , Rfl:     ABATACEPT IV, Infuse 1,000 mg intravenously every 28 days , Disp: , Rfl:     leflunomide (ARAVA) 20 MG tablet, Take 20 mg by mouth daily, Disp: , Rfl:     bumetanide (BUMEX) 1 MG tablet, Take 1 tablet by mouth daily, Disp: 90 tablet, Rfl: 1    SITagliptin (JANUVIA) 50 MG tablet, Take 1 tablet by mouth daily, Disp: 90 tablet, Rfl: 1    potassium chloride (KLOR-CON M) 20 MEQ extended release tablet, Take 1 tablet by mouth daily, Disp: 90 tablet, Rfl: 1    metFORMIN (GLUCOPHAGE) 500 MG tablet, Take 1 tablet by mouth 2 times daily (with meals), Disp: 180 tablet, Rfl: 1    sertraline (ZOLOFT) 100 MG tablet, Take 1 tablet by mouth daily, Disp: 90 tablet, Rfl: 3    albuterol sulfate HFA (VENTOLIN HFA) 108 (90 Base) MCG/ACT inhaler, Inhale 2 puffs into the lungs 4 times daily as needed for Wheezing, Disp: 3 Inhaler, Rfl: 3    lisinopril-hydroCHLOROthiazide (PRINZIDE;ZESTORETIC) 10-12.5 MG per tablet, Take 1 tablet by mouth daily, Disp: 90 tablet, Rfl: 3    loratadine (CLARITIN) 10 MG tablet, Take 1 tablet by mouth daily, Disp: 90 tablet, Rfl: 3    Cholecalciferol (VITAMIN D3) 1000 units TABS, Take 1 tablet by mouth daily, Disp: , Rfl:     Lutein-Zeaxanthin 25-5 MG CAPS, Take by mouth daily , Disp: , Rfl:     diphenhydrAMINE (BENADRYL) 25 MG tablet, Take 25 mg by mouth every 6 hours as needed for Itching, Disp: , Rfl:     Ascorbic Acid (VITAMIN C) 500 MG CAPS, Take 1,000 mg by mouth daily , Disp: , Rfl:     Omega-3 Fatty Acids (OMEGA 3 PO), Take by mouth 2 times daily 4614-9734 mg, Disp: , Rfl:     Multiple Vitamins-Minerals (MULTIVITAL-M PO), Take by mouth daily , Disp: , Rfl:     acetaminophen (TYLENOL) 325 MG tablet, Take 650 mg by mouth 2 times daily , Disp: , Rfl:     folic acid (FOLVITE) 1 MG tablet, Take 1 mg by mouth daily. , Disp: , Rfl:     methotrexate, PF, (RHEUMATREX) 25 MG/ML chemo injection, Inject 25 mg into the muscle once a week , Disp: , Rfl:     Past Medical History  Shea Alas  has a past medical history of Arthritis, Hypertension, Mild depression ([address] and PCOS (polycystic ovarian syndrome). Social History  Shea Alas  reports that she has never smoked. She has never used smokeless tobacco. She reports that she does not drink alcohol and does not use drugs. Family History  Shea Alas family history includes Arthritis in her father; Cancer in her mother. Past Surgical History   Past Surgical History:   Procedure Laterality Date    WISDOM TOOTH EXTRACTION         Subjective:     REVIEW OF SYSTEMS  Constitutional: denies sweats, chills and fever  HENT: denies  congestion, sinus pressure, sneezing and sore throat. Eyes: denies  pain, discharge, redness and itching. Respiratory: denies apnea, cough  Gastrointestinal: denies blood in stool, constipation, diarrhea   Endocrine: denies cold intolerance, heat intolerance, polydipsia. Genitourinary: denies dysuria, enuresis, flank pain and hematuria. Musculoskeletal: denies arthralgias, joint swelling and neck pain.    Neurological: denies numbness and headaches. Psychiatric/Behavioral: denies agitation, confusion, decreased concentration and dysphoric mood    All others reviewed and are negative. Objective:     /72   Pulse 78   Ht 5' 8\" (1.727 m)   Wt (!) 323 lb (146.5 kg)   BMI 49.11 kg/m²     Wt Readings from Last 3 Encounters:   08/19/21 (!) 323 lb (146.5 kg)   08/09/21 (!) 328 lb 6.4 oz (149 kg)   07/12/21 (!) 325 lb 9.6 oz (147.7 kg)     BP Readings from Last 3 Encounters:   08/19/21 112/72   08/09/21 128/78   07/12/21 128/80       PHYSICAL EXAM  Constitutional: Oriented to person, place, and time. Appears well-developed and well-nourished. HENT:   Head: Normocephalic and atraumatic. Eyes: EOM are normal. Pupils are equal, round, and reactive to light. Neck: Normal range of motion. Neck supple. No JVD present. Cardiovascular: Normal rate , normal heart sounds and intact distal pulses. Pulmonary/Chest: Effort normal and breath sounds normal. No respiratory distress. No wheezes. No rales. Abdominal: Soft. Bowel sounds are normal. No distension. There is no tenderness. Musculoskeletal: Normal range of motion. No edema. Neurological: Alert and oriented to person, place, and time. No cranial nerve deficit. Coordination normal.   Skin: Skin is warm and dry. Psychiatric: Normal mood and affect.        No results found for: CKTOTAL, CKMB, CKMBINDEX    Lab Results   Component Value Date    WBC 8.6 08/05/2021    RBC 4.29 08/05/2021    RBC 4.22 05/07/2012    HGB 11.8 08/05/2021    HCT 37.5 08/05/2021    MCV 87.4 08/05/2021    MCH 27.5 08/05/2021    MCHC 31.5 08/05/2021    RDW 15.0 04/23/2018     08/05/2021    MPV 9.7 08/05/2021       Lab Results   Component Value Date     08/04/2020    K 4.2 08/04/2020    CL 98 08/04/2020    CO2 25 08/04/2020    BUN 12 08/04/2020    LABALBU 4.2 07/07/2021    LABALBU 4.4 05/07/2012    CREATININE 0.5 08/05/2021    CALCIUM 9.8 08/04/2020    LABGLOM >90 08/05/2021    GLUCOSE 111 08/04/2020       Lab Results   Component Value Date    ALKPHOS 73 08/04/2020    ALT 33 08/05/2021    AST 32 08/05/2021    PROT 7.4 08/04/2020    BILITOT 0.3 08/04/2020    LABALBU 4.2 07/07/2021    LABALBU 4.4 05/07/2012       No results found for: MG    No results found for: INR, PROTIME      Lab Results   Component Value Date    LABA1C 5.7 08/09/2021    LABA1C 6.3 08/04/2020       Lab Results   Component Value Date    TRIG 257 12/18/2018    HDL 32 12/18/2018    LDLCALC 67 12/18/2018       Lab Results   Component Value Date    TSH 2.190 07/23/2021         Testing Reviewed:      I haveindividually reviewed the below cardiac tests    EKG: Normal sinus rhythm  Low voltage QRS, consider pulmonary disease, pericardial effusion, or normal variant  Cannot rule out Anterior infarct , age undetermined    ECHO: Results for orders placed during the hospital encounter of 08/16/21    ECHO Complete 2D W Doppler W Color    Narrative  Transthoracic Echocardiography Report (TTE)    Demographics    Patient Name   Surekha Olivares Gender              Female    MR #           [id number]     Race                    Ethnicity    Account #      [de-identified]     Room Number    Accession      [id number]    Date of Study       08/16/2021  Number    Date of Birth  1975    Referring Physician Susana Seip L DO Theodoro Lodge CNP    Age            39 year(s)    Nasim Pacheco LILIANA    Interpreting        Nery Fairbanks MD  Physician    Procedure    Type of Study    TTE procedure:ECHOCARDIOGRAM COMPLETE 2D W DOPPLER W COLOR. Procedure Date  Date: 08/16/2021 Start: 11:22 AM    Study Location: Echo Lab  Technical Quality: Limited visualization due to body habitus. Indications:Abnormal ECG and Preop cardiac evaluation.     Additional Medical History:VICKY, Hypertension, Arthritis, SOB, Edema    Patient Status: Routine    Height: 67.72 inches Weight: 328.01 pounds BSA: 2.51 m^2 BMI: 50.29 kg/m^2    BP: 128/78 mmHg    Conclusions    Summary  Technically difficult examination. Left ventricular size and systolic function is normal. Ejection fraction  was estimated at 55-60%. LV wall thickness is within normal limits. The right ventricular size appears normal with normal systolic function  and wall thickness. Signature    ----------------------------------------------------------------  Electronically signed by Angel Espino MD (Interpreting  physician) on 08/17/2021 at 04:06 PM  ----------------------------------------------------------------    Findings    Mitral Valve  The mitral valve structure is normal with normal leaflet separation. DOPPLER: The transmitral velocity was within the normal range with no  evidence for mitral stenosis. There was no evidence of mitral  regurgitation. Aortic Valve  The aortic valve appears trileaflet with normal thickness and leaflet  excursion. DOPPLER: Transaortic velocity was within the normal range with  no evidence of aortic stenosis. There was no evidence of aortic  regurgitation. Tricuspid Valve  The tricuspid valve structure is normal with normal leaflet separation. DOPPLER: There is no evidence of tricuspid stenosis. There was no evidence  of tricuspid regurgitation. Pulmonic Valve  The pulmonic valve was not well visualized . Left Atrium  Left atrial size is normal.    Left Ventricle  Left ventricular size and systolic function is normal. Ejection fraction  was estimated at 55-60%. LV wall thickness is within normal limits. Right Atrium  Right atrial size was normal.    Right Ventricle  The right ventricular size appears normal with normal systolic function  and wall thickness. Pericardial Effusion  The pericardium appears normal with no evidence of a pericardial effusion. Pleural Effusion  No evidence of pleural effusion. Aorta / Great Vessels  -Aortic root dimension within normal limits.   -IVC size is within normal limits with normal respiratory phasic changes. M-Mode/2D Measurements & Calculations    LV Diastolic    LV Systolic Dimension:    AV Cusp Separation: 2.1 cmLA  Dimension: 5.2  3.4 cm                    Dimension: 3.3 cmAO Root  cm              LV Volume Diastolic: 263  Dimension: 3 cmLA Area: 18.2  LV FS:34.6 %    ml                        cm^2  LV PW           LV Volume Systolic: 29.7  Diastolic: 0.8  ml  cm              LV EDV/LV EDV Index: 130  Septum          ml/52 m^2LV ESV/LV ESV    RV Diastolic Dimension: 2.7 cm  Diastolic: 0.8  Index: 33.7 ml/19 m^2  cm              EF Calculated: 63.5 %     LA/Aorta: 1.1    LA volume/Index: 49.4 ml /20m^2    Doppler Measurements & Calculations    MV Peak E-Wave: 87 cm/s     AV Peak Velocity: 183  LVOT Peak Velocity: 111  MV Peak A-Wave: 67.3 cm/s   cm/s                   cm/s  MV E/A Ratio: 1.29          AV Peak Gradient: 13.4 LVOT Peak Gradient: 5  MV Peak Gradient: 3.03 mmHg mmHg                   mmHg    MV Deceleration Time: 236                          TV Peak E-Wave: 56.6  msec                                               cm/s  MV P1/2t: 69 msec                                  TV Peak A-Wave: 39 cm/s  MVA by PHT:3.19 cm^2        IVRT: 60 msec  TV Peak Gradient: 1.28  MV E' Septal Velocity: 6.5                         mmHg  cm/s                        AV DVI (Vmax):0.61  MV A' Septal Velocity: 7.1                         PV Peak Velocity: 84.4  cm/s                                               cm/s  MV E' Lateral Velocity:                            PV Peak Gradient: 2.85  12.9 cm/s                                          mmHg  MV A' Lateral Velocity: 9.8  cm/s  E/E' septal: 13.38  E/E' lateral: 6.74    [email or web address]  [id number]      STRESS:    CATH:    Assessment/Plan       Diagnosis Orders   1.  Preop cardiovascular exam       Preop risk stratification for D&C  VICKY

## 2021-08-19 NOTE — PROGRESS NOTES
New patient here for check up needs clearance for D&C sx with Dr. Viridiana Engel kesha TBD    Pt continues with sob , swelling in legs and feet, dizziness    Pt denies chest pain, heart palpitations,

## 2021-10-05 ENCOUNTER — NURSE ONLY (OUTPATIENT)
Dept: LAB | Age: 46
End: 2021-10-05

## 2021-10-05 LAB
ALT SERPL-CCNC: 28 U/L (ref 11–66)
AST SERPL-CCNC: 22 U/L (ref 5–40)
BASOPHILS # BLD: 0.5 %
BASOPHILS ABSOLUTE: 0 THOU/MM3 (ref 0–0.1)
CREAT SERPL-MCNC: 0.5 MG/DL (ref 0.4–1.2)
EOSINOPHIL # BLD: 2 %
EOSINOPHILS ABSOLUTE: 0.2 THOU/MM3 (ref 0–0.4)
ERYTHROCYTE [DISTWIDTH] IN BLOOD BY AUTOMATED COUNT: 15.2 % (ref 11.5–14.5)
ERYTHROCYTE [DISTWIDTH] IN BLOOD BY AUTOMATED COUNT: 47 FL (ref 35–45)
GFR SERPL CREATININE-BSD FRML MDRD: > 90 ML/MIN/1.73M2
HCT VFR BLD CALC: 39.8 % (ref 37–47)
HEMOGLOBIN: 12.7 GM/DL (ref 12–16)
IMMATURE GRANS (ABS): 0.03 THOU/MM3 (ref 0–0.07)
IMMATURE GRANULOCYTES: 0.3 %
LYMPHOCYTES # BLD: 23.2 %
LYMPHOCYTES ABSOLUTE: 2 THOU/MM3 (ref 1–4.8)
MCH RBC QN AUTO: 27.7 PG (ref 26–33)
MCHC RBC AUTO-ENTMCNC: 31.9 GM/DL (ref 32.2–35.5)
MCV RBC AUTO: 86.7 FL (ref 81–99)
MONOCYTES # BLD: 6.7 %
MONOCYTES ABSOLUTE: 0.6 THOU/MM3 (ref 0.4–1.3)
NUCLEATED RED BLOOD CELLS: 0 /100 WBC
PLATELET # BLD: 382 THOU/MM3 (ref 130–400)
PMV BLD AUTO: 9.9 FL (ref 9.4–12.4)
RBC # BLD: 4.59 MILL/MM3 (ref 4.2–5.4)
SEDIMENTATION RATE, ERYTHROCYTE: 26 MM/HR (ref 0–20)
SEG NEUTROPHILS: 67.3 %
SEGMENTED NEUTROPHILS ABSOLUTE COUNT: 5.9 THOU/MM3 (ref 1.8–7.7)
WBC # BLD: 8.7 THOU/MM3 (ref 4.8–10.8)

## 2021-10-25 ENCOUNTER — VIRTUAL VISIT (OUTPATIENT)
Dept: FAMILY MEDICINE CLINIC | Age: 46
End: 2021-10-25
Payer: COMMERCIAL

## 2021-10-25 DIAGNOSIS — E11.65 CONTROLLED TYPE 2 DIABETES MELLITUS WITH HYPERGLYCEMIA, WITHOUT LONG-TERM CURRENT USE OF INSULIN (HCC): ICD-10-CM

## 2021-10-25 DIAGNOSIS — R09.82 POST-NASAL DRIP: ICD-10-CM

## 2021-10-25 DIAGNOSIS — J32.1 CHRONIC FRONTAL SINUSITIS: ICD-10-CM

## 2021-10-25 DIAGNOSIS — R11.10 MORNING VOMITING: Primary | ICD-10-CM

## 2021-10-25 DIAGNOSIS — E88.81 INSULIN RESISTANCE: ICD-10-CM

## 2021-10-25 PROCEDURE — G8484 FLU IMMUNIZE NO ADMIN: HCPCS | Performed by: NURSE PRACTITIONER

## 2021-10-25 PROCEDURE — G8427 DOCREV CUR MEDS BY ELIG CLIN: HCPCS | Performed by: NURSE PRACTITIONER

## 2021-10-25 PROCEDURE — 1036F TOBACCO NON-USER: CPT | Performed by: NURSE PRACTITIONER

## 2021-10-25 PROCEDURE — 2022F DILAT RTA XM EVC RTNOPTHY: CPT | Performed by: NURSE PRACTITIONER

## 2021-10-25 PROCEDURE — 3044F HG A1C LEVEL LT 7.0%: CPT | Performed by: NURSE PRACTITIONER

## 2021-10-25 PROCEDURE — 99213 OFFICE O/P EST LOW 20 MIN: CPT | Performed by: NURSE PRACTITIONER

## 2021-10-25 PROCEDURE — G8417 CALC BMI ABV UP PARAM F/U: HCPCS | Performed by: NURSE PRACTITIONER

## 2021-10-25 RX ORDER — LEVOCETIRIZINE DIHYDROCHLORIDE 5 MG/1
5 TABLET, FILM COATED ORAL NIGHTLY
Qty: 90 TABLET | Refills: 1 | Status: SHIPPED | OUTPATIENT
Start: 2021-10-25 | End: 2022-04-11

## 2021-10-25 RX ORDER — ERTUGLIFLOZIN 15 MG/1
1 TABLET, FILM COATED ORAL DAILY
Qty: 30 TABLET | Refills: 0 | Status: SHIPPED | OUTPATIENT
Start: 2021-10-25 | End: 2021-11-22

## 2021-10-25 RX ORDER — FAMOTIDINE 40 MG/1
40 TABLET, FILM COATED ORAL EVERY EVENING
Qty: 90 TABLET | Refills: 1 | Status: SHIPPED | OUTPATIENT
Start: 2021-10-25 | End: 2022-04-11

## 2021-10-25 ASSESSMENT — ENCOUNTER SYMPTOMS
ABDOMINAL PAIN: 1
WHEEZING: 0
EYES NEGATIVE: 1
SINUS PRESSURE: 1
DIARRHEA: 1
RHINORRHEA: 1
NAUSEA: 1
COUGH: 1
HOARSE VOICE: 1
VOMITING: 1
CHEST TIGHTNESS: 0
SHORTNESS OF BREATH: 0

## 2021-10-25 ASSESSMENT — PATIENT HEALTH QUESTIONNAIRE - PHQ9
SUM OF ALL RESPONSES TO PHQ QUESTIONS 1-9: 0
SUM OF ALL RESPONSES TO PHQ9 QUESTIONS 1 & 2: 0
SUM OF ALL RESPONSES TO PHQ QUESTIONS 1-9: 0
1. LITTLE INTEREST OR PLEASURE IN DOING THINGS: 0
2. FEELING DOWN, DEPRESSED OR HOPELESS: 0
SUM OF ALL RESPONSES TO PHQ QUESTIONS 1-9: 0

## 2021-10-25 NOTE — PROGRESS NOTES
[address] [facility]  Dept: BRUNA Coyne (:  1975) is a 55 y.o. female,Established patient, here for evaluation of the following chief complaint(s):  Nausea & Vomiting    Castro Kraus, was evaluated through a synchronous (real-time) audio-video encounter. The patient (or guardian if applicable) is aware that this is a billable service. Verbal consent to proceed has been obtained within the past 12 months. The visit was conducted pursuant to the emergency declaration under the [facility], [address] [place] [address] authority and the [employer] General Act. Patient identification was verified, and a caregiver was present when appropriate. The patient was located in a state where the provider was credentialed to provide care. Total time spent for this encounter: 10 min    --THU Clifford CNP on 10/25/2021 at 9:07 AM    An electronic signature was used to authenticate this note. ASSESSMENT/PLAN:  I have reviewed the patient's medical history in detail and updated the computerized patient record. HPI/ROS per the patient and caregiver. Overall non toxic in appearance. Answers questions appropriately. Conditions discussed and addressed this visit include:     Pt with ongoing nausea, vomiting and diarrhea. May be related to her RA meds, as well as [place] and [place]. Made worse by the ARAVA medication. Constant PND with cough and thick nasal secretions. Vomiting occurring only in the morning with the nausea throughout the day. Unlikely related to her lisinopril. Will stop the claritin, start xyzal and famotidine at bed time. Change the januvia to jardiance and see if this helps the  Nausea as well. Continue to push fluids  Cool mist humidifier  May use nasal azelastine for nasal congestion  Recheck in 4 weeks  Plan for GI referral if symptoms no better.    Pt agreeable to plan of care. 1. Morning vomiting  -     levocetirizine (XYZAL) 5 MG tablet; Take 1 tablet by mouth nightly, Disp-90 tablet, R-1Normal  -     famotidine (PEPCID) 40 MG tablet; Take 1 tablet by mouth every evening, Disp-90 tablet, R-1Normal  2. Chronic frontal sinusitis  -     levocetirizine (XYZAL) 5 MG tablet; Take 1 tablet by mouth nightly, Disp-90 tablet, R-1Normal  3. Post-nasal drip  -     levocetirizine (XYZAL) 5 MG tablet; Take 1 tablet by mouth nightly, Disp-90 tablet, R-1Normal  4. Insulin resistance  -     Ertugliflozin L-PyroglutamicAc (STEGLATRO) 15 MG TABS; Take 1 tablet by mouth daily, Disp-30 tablet, R-0Normal  -     famotidine (PEPCID) 40 MG tablet; Take 1 tablet by mouth every evening, Disp-90 tablet, R-1Normal  5. Controlled type 2 diabetes mellitus with hyperglycemia, without long-term current use of insulin (HCC)  -     famotidine (PEPCID) 40 MG tablet; Take 1 tablet by mouth every evening, Disp-90 tablet, R-1Normal      Return in about 4 weeks (around 11/22/2021) for Results review, Routine follow up. SUBJECTIVE/OBJECTIVE:  Vomiting early in the morning at least weekly for over the last year. Has had several med changes related to her RA over the last year. Also on januvia. Leroy Rojoin has made her symptoms worse. Nasal congestion and pnd have made her symptoms worse as well. Nausea & Vomiting  This is a recurrent problem. The current episode started more than 1 month ago. The problem occurs every several days. The problem has been gradually worsening. Associated symptoms include abdominal pain, anorexia, arthralgias, congestion, coughing, fatigue, headaches, joint swelling, myalgias, nausea and vomiting. Pertinent negatives include no chest pain or weakness. The symptoms are aggravated by eating and drinking. She has tried position changes for the symptoms. The treatment provided no relief. Sinusitis  This is a chronic problem.  The current episode started more than 1 month ago. The problem is unchanged. There has been no fever. Her pain is at a severity of 4/10. The pain is moderate. Associated symptoms include congestion, coughing, headaches, a hoarse voice and sinus pressure. Pertinent negatives include no shortness of breath. Past treatments include acetaminophen, antibiotics, oral decongestants and sitting up. The treatment provided no relief.    Review of Systems   Constitutional: Positive for activity change, appetite change and fatigue. HENT: Positive for congestion, hoarse voice, postnasal drip, rhinorrhea and sinus pressure. Eyes: Negative. Respiratory: Positive for cough. Negative for chest tightness, shortness of breath and wheezing. Cardiovascular: Negative for chest pain and leg swelling. Gastrointestinal: Positive for abdominal pain, anorexia, diarrhea, nausea and vomiting. Endocrine: Negative for cold intolerance and heat intolerance. Genitourinary: Negative. Musculoskeletal: Positive for arthralgias, joint swelling and myalgias. Skin: Negative. Allergic/Immunologic: Positive for environmental allergies. Neurological: Positive for headaches. Negative for weakness. Hematological: Negative for adenopathy. Does not bruise/bleed easily. Psychiatric/Behavioral: Negative. Physical Exam  Vitals and nursing note reviewed. Constitutional:       Appearance: She is obese. She is not ill-appearing. HENT:      Head: Normocephalic. Right Ear: External ear normal.      Left Ear: External ear normal.      Nose: Congestion and rhinorrhea present. Mouth/Throat:      Mouth: Mucous membranes are dry. Eyes:      Conjunctiva/sclera: Conjunctivae normal.   Pulmonary:      Effort: Pulmonary effort is normal.   Musculoskeletal:         General: Normal range of motion. Cervical back: Normal range of motion and neck supple. Skin:     General: Skin is dry. Coloration: Skin is pale.    Neurological:      General: No focal deficit present. Mental Status: She is oriented to person, place, and time. Mental status is at baseline. Psychiatric:         Mood and Affect: Mood normal.         Thought Content: Thought content normal.                 An electronic signature was used to authenticate this note.     --THU Phipps - CNP

## 2021-10-25 NOTE — PATIENT INSTRUCTIONS
Patient Education        Managing Your Allergies: Care Instructions  Your Care Instructions     Managing your allergies is an important part of staying healthy. Your doctor will help you find out what may be the cause of the allergies. Common causes of symptoms are house dust and dust mites, animal dander, mold, and pollen. As soon as you know what triggers your symptoms, try to avoid those things. This can help prevent allergy symptoms, asthma, and other health problems. Ask your doctor about allergy medicine or immunotherapy. These treatments may help reduce or prevent allergy symptoms. Follow-up care is a key part of your treatment and safety. Be sure to make and go to all appointments, and call your doctor if you are having problems. It's also a good idea to know your test results and keep a list of the medicines you take. How can you care for yourself at home? · If you have been told by your doctor that dust or dust mites are causing your allergy, decrease the dust around your bed:  ? Wash sheets, pillowcases, and other bedding in hot water every week. ? Use dust-proof covers for pillows, duvets, and mattresses. Avoid plastic covers because they tear easily and do not \"breathe. \" Wash as instructed on the label. ? Do not use any blankets and pillows that you do not need. ? Use blankets that you can wash in your washing machine. ? Consider removing drapes and carpets, which attract and hold dust, from your bedroom. · If you are allergic to house dust and mites, do not use home humidifiers. Your doctor can suggest ways you can control dust and mites. · Look for signs of cockroaches. Cockroaches cause allergic reactions. Use cockroach baits to get rid of them. Then, clean your home well. Cockroaches like areas where grocery bags, newspapers, empty bottles, or cardboard boxes are stored. Do not keep these inside your home, and keep trash and food containers sealed.  Seal off any spots where cockroaches might enter your home. · If you are allergic to mold, get rid of furniture, rugs, and drapes that smell musty. Check for mold in the bathroom. · If you are allergic to outdoor pollen or mold spores, use air-conditioning. Change or clean all filters every month. Keep windows closed. · If you are allergic to pollen, stay inside when pollen counts are high. Use a vacuum  with a HEPA filter or a double-thickness filter at least two times each week. · Stay inside when air pollution is bad. Avoid paint fumes, perfumes, and other strong odors. · Avoid conditions that make your allergies worse. Stay away from smoke. Do not smoke or let anyone else smoke in your house. Do not use fireplaces or wood-burning stoves. · If you are allergic to your pets, change the air filter in your furnace every month. Use high-efficiency filters. · If you are allergic to pet dander, keep pets outside or out of your bedroom. Old carpet and cloth furniture can hold a lot of animal dander. You may need to replace them. When should you call for help? Give an epinephrine shot if:    · You think you are having a severe allergic reaction. After giving an epinephrine shot call 911, even if you feel better. Call 911 if:    · You have symptoms of a severe allergic reaction. These may include:  ? Sudden raised, red areas (hives) all over your body. ? Swelling of the throat, mouth, lips, or tongue. ? Trouble breathing. ? Passing out (losing consciousness). Or you may feel very lightheaded or suddenly feel weak, confused, or restless.     · You have been given an epinephrine shot, even if you feel better. Call your doctor now or seek immediate medical care if:    · You have symptoms of an allergic reaction, such as:  ? A rash or hives (raised, red areas on the skin). ? Itching. ? Swelling. ? Belly pain, nausea, or vomiting.    Watch closely for changes in your health, and be sure to contact your doctor if:    · Your allergies get worse.     · You need help controlling your allergies.     · You have questions about allergy testing.     · You do not get better as expected. Where can you learn more? Go to https://chpepiceweb.metraTec. org and sign in to your Workhint account. Enter L249 in the [facility] box to learn more about \"Managing Your Allergies: Care Instructions. \"     If you do not have an account, please click on the \"Sign Up Now\" link. Current as of: February 10, 2021               Content Version: 13.0  © 8213-0393 Rights[employer]. Care instructions adapted under license by [facility] ([facility]). If you have questions about a medical condition or this instruction, always ask your healthcare professional. [address] any warranty or liability for your use of this information. Patient Education        Seasonal Allergies: Care Instructions  Your Care Instructions     Allergies occur when your body's defense system (immune system) overreacts to certain substances. The immune system treats a harmless substance as if it were a harmful germ or virus. Many things can cause this to happen. Examples include pollens, medicine, food, dust, animal dander, and mold. Your allergies are seasonal if you have symptoms just at certain times of the year. In that case, you are probably allergic to pollens from certain trees, grasses, or weeds. Allergies can be mild or severe. Over-the-counter allergy medicine may help with some symptoms. Read and follow all instructions on the label. Managing your allergies is an important part of staying healthy. Your doctor may suggest that you have tests to help find the cause of your allergies. When you know what things trigger your symptoms, you can avoid them. This can prevent allergy symptoms and other health problems. In some cases, immunotherapy might help.  For this treatment, you get shots or use pills that have a small amount of certain allergens in sign in to your [employer] account. Enter J912 in the [facility] box to learn more about \"Seasonal Allergies: Care Instructions. \"     If you do not have an account, please click on the \"Sign Up Now\" link. Current as of: February 10, 2021               Content Version: 13.0  © 2006-2021 "[employer]. Care instructions adapted under license by [facility] ([facility]). If you have questions about a medical condition or this instruction, always ask your healthcare professional. [place] [address] any warranty or liability for your use of this information. Patient Education        Peptic Ulcer Disease: Care Instructions  Overview     Peptic ulcers are sores on the inside of the stomach. Or they may be on the inside of the small intestine (such as a duodenal ulcer). They are most often caused by an infection with bacteria or from use of nonsteroidal anti-inflammatory drugs (NSAIDs). NSAIDs include aspirin, ibuprofen (Advil), and naproxen (Aleve). Your doctor may have prescribed medicine to reduce stomach acid. You also may need to take antibiotics if your peptic ulcers are caused by an infection. You can help yourself heal and keep ulcers from coming back. You can do this by making some changes in your lifestyle. Quit smoking. Limit alcohol. Follow-up care is a key part of your treatment and safety. Be sure to make and go to all appointments, and call your doctor if you are having problems. It's also a good idea to know your test results and keep a list of the medicines you take. How can you care for yourself at home? · Be safe with medicines. Take your medicines exactly as prescribed. Call your doctor if you think you are having a problem with your medicine. · Do not take aspirin or other NSAIDs such as ibuprofen (Advil or Motrin) or naproxen (Aleve). Ask your doctor what you can take for pain. · Do not smoke. Smoking can make ulcers worse.  If you need help quitting, talk to your doctor about stop-smoking programs and medicines. These can increase your chances of quitting for good. · Drink in moderation or avoid drinking alcohol. · Eat a balanced diet of small, frequent meals. See a dietitian if you need help planning your meals. When should you call for help? Call 911  anytime you think you may need emergency care. For example, call if:    · You vomit blood or what looks like coffee grounds.     · You pass maroon or very bloody stools. Call your doctor now or seek immediate medical care if:    · You have new or worse belly pain.     · Your stools are black and look like tar, or they have streaks of blood.     · You vomit. Watch closely for changes in your health, and be sure to contact your doctor if:    · You do not get better as expected. Where can you learn more? Go to https://GET Holding NVpepicCambio+ Healthcare Systemseb.Cycle Money. org and sign in to your [employer] account. Enter V218 in the [employer] box to learn more about \"Peptic Ulcer Disease: Care Instructions. \"     If you do not have an account, please click on the \"Sign Up Now\" link. Current as of: February 10, 2021               Content Version: 13.0  © 4311-6273 Healthwise, Incorporated. Care instructions adapted under license by [facility] ([facility]). If you have questions about a medical condition or this instruction, always ask your healthcare professional. [place] [address] any warranty or liability for your use of this information.

## 2021-11-11 RX ORDER — FLUCONAZOLE 150 MG/1
TABLET ORAL
Qty: 2 TABLET | Refills: 0 | Status: SHIPPED | OUTPATIENT
Start: 2021-11-11 | End: 2022-01-17

## 2022-01-14 DIAGNOSIS — U07.1 COVID-19: Primary | ICD-10-CM

## 2022-01-14 RX ORDER — METHYLPREDNISOLONE 4 MG/1
TABLET ORAL
Qty: 1 KIT | Refills: 0 | Status: SHIPPED | OUTPATIENT
Start: 2022-01-14 | End: 2022-01-20

## 2022-01-14 RX ORDER — DEXTROMETHORPHAN HYDROBROMIDE AND PROMETHAZINE HYDROCHLORIDE 15; 6.25 MG/5ML; MG/5ML
5 SYRUP ORAL 4 TIMES DAILY PRN
Qty: 150 ML | Refills: 0 | Status: SHIPPED | OUTPATIENT
Start: 2022-01-14 | End: 2022-01-21

## 2022-01-14 RX ORDER — AZITHROMYCIN 250 MG/1
250 TABLET, FILM COATED ORAL SEE ADMIN INSTRUCTIONS
Qty: 6 TABLET | Refills: 0 | Status: SHIPPED | OUTPATIENT
Start: 2022-01-14 | End: 2022-01-19

## 2022-01-14 NOTE — PROGRESS NOTES
[address]  Dept: [phone]        Pt with positive home test for covid. ASSESSMENT/PLAN:  1. COVID-19  -     methylPREDNISolone (MEDROL, JOSÉ,) 4 MG tablet; Take by mouth., Disp-1 kit, R-0Normal  -     azithromycin (ZITHROMAX) 250 MG tablet; Take 1 tablet by mouth See Admin Instructions for 5 days 500mg on day 1 followed by 250mg on days 2 - 5, Disp-6 tablet, R-0Normal  -     promethazine-dextromethorphan (PROMETHAZINE-DM) 6.25-15 MG/5ML syrup;  Take 5 mLs by mouth 4 times daily as needed for Cough, Disp-150 mL, R-0Normal

## 2022-01-14 NOTE — LETTER
[address] [address]  Phone: [phone]  Fax: [address], APRN - CNP        January 14, 2022     Patient: Zev Chaves   YOB: 1975   Date of Visit: 1/14/2022       To Whom It May Concern: It is my medical opinion that Luis De Dios may return to work on 1/18/2022 if covid test is negative. If you have any questions or concerns, please don't hesitate to call.     Sincerely,        THU Wade Cha, CNP

## 2022-01-17 ENCOUNTER — PATIENT MESSAGE (OUTPATIENT)
Dept: FAMILY MEDICINE CLINIC | Age: 47
End: 2022-01-17

## 2022-01-17 RX ORDER — FLUCONAZOLE 150 MG/1
150 TABLET ORAL
Qty: 2 TABLET | Refills: 0 | Status: SHIPPED | OUTPATIENT
Start: 2022-01-17 | End: 2022-01-23

## 2022-01-17 NOTE — LETTER
[address] [address]  Phone: [phone]  Fax: [address], APRN - CNP        January 17, 2022     Patient: Delsie Leyden   YOB: 1975   Date of Visit: 1/17/2022       To Whom It May Concern: It is my medical opinion that Joyce Schmidt may return to work on 1/19/2022 following being ill with covid. .    If you have any questions or concerns, please don't hesitate to call.     Sincerely,        Hamilton Saucedo, THU - CNP

## 2022-01-17 NOTE — TELEPHONE ENCOUNTER
From: Angeline Blanchard  To: Eliecer Levin  Sent: 1/17/2022 8:11 AM EST  Subject: Diflucan and changing my return to work date    [employer],   Thank you for the prescriptions you called in for me when you called in the steroid for Liya Tolentino! Would it be possible for you to please call in a prescription for at least 2 diflucan as well for me? Also, could my return date on my return to work day Wednesday, January 19, please? Im currently back to feeling about 60-70% more like myself. DEFINITELY feeling so much better than Thursday through Saturday. My fever was gone by Sunday. Still no taste or smell (lost that Friday into Saturday). My throat is sore, still some coughing, ears are a bit twingy, and tired. I take the rapid test later today to make sure its negative so I can return to work. Wasnt sure if this could be done with the message or if I should schedule a virtual appointment with you today?    Thank you for your time reading this,   Amanda Smith

## 2022-01-18 ENCOUNTER — TELEPHONE (OUTPATIENT)
Dept: FAMILY MEDICINE CLINIC | Age: 47
End: 2022-01-18

## 2022-01-18 NOTE — TELEPHONE ENCOUNTER
Pt needs to stay home for the entire 10 days to be considered no longer positive. Should be no need to retest at the end of the 10 days.

## 2022-01-18 NOTE — TELEPHONE ENCOUNTER
----- Message from Chrissy Doyle sent at 1/17/2022 10:41 AM EST -----  Subject: Message to Provider    QUESTIONS  Information for Provider? Pt called to follow up on her note she sent   earlier, she took the covid test and it came back positive, pt would like   a call back with advice on what to do next.  ---------------------------------------------------------------------------  --------------  CALL BACK INFO  What is the best way for the office to contact you? OK to leave message on   voicemail  Preferred Call Back Phone Number? [phone]  ---------------------------------------------------------------------------  --------------  SCRIPT ANSWERS  Relationship to Patient?  Self

## 2022-03-11 DIAGNOSIS — E88.81 INSULIN RESISTANCE: Primary | ICD-10-CM

## 2022-03-11 DIAGNOSIS — E11.65 CONTROLLED TYPE 2 DIABETES MELLITUS WITH HYPERGLYCEMIA, WITHOUT LONG-TERM CURRENT USE OF INSULIN (HCC): ICD-10-CM

## 2022-03-14 NOTE — TELEPHONE ENCOUNTER
I mailed the lab orders to the patient today written on top of the orders that Kailash Most wants her to have them done the same time she gets labs done for Dr. Suresh Bertrand and hi-lited it.

## 2022-03-14 NOTE — TELEPHONE ENCOUNTER
Lydia Musa needs refill of   Requested Prescriptions     Pending Prescriptions Disp Refills    metFORMIN (GLUCOPHAGE) 500 MG tablet [Pharmacy Med Name: metFORMIN HCl 500 MG Oral Tablet] 180 tablet 0     Sig: TAKE 1 TABLET BY MOUTH TWICE DAILY WITH MEALS       Last Filled on:  08- #180 R-1 and sent to [facility] in [place], [place] by Mary Brooks CNP.       Last Visit Date:  10/25/2021    Next Visit Date:  Visit date not found

## 2022-03-14 NOTE — TELEPHONE ENCOUNTER
Orders are in for microalbumin and hgb a1c to be done with the next set of labs she has done for  [facility].

## 2022-04-08 DIAGNOSIS — R11.10 MORNING VOMITING: ICD-10-CM

## 2022-04-08 DIAGNOSIS — R09.82 POST-NASAL DRIP: ICD-10-CM

## 2022-04-08 DIAGNOSIS — E11.65 CONTROLLED TYPE 2 DIABETES MELLITUS WITH HYPERGLYCEMIA, WITHOUT LONG-TERM CURRENT USE OF INSULIN (HCC): ICD-10-CM

## 2022-04-08 DIAGNOSIS — E88.81 INSULIN RESISTANCE: ICD-10-CM

## 2022-04-08 DIAGNOSIS — J32.1 CHRONIC FRONTAL SINUSITIS: ICD-10-CM

## 2022-04-11 RX ORDER — FAMOTIDINE 40 MG/1
TABLET, FILM COATED ORAL
Qty: 90 TABLET | Refills: 1 | Status: SHIPPED | OUTPATIENT
Start: 2022-04-11 | End: 2022-10-11

## 2022-04-11 RX ORDER — LEVOCETIRIZINE DIHYDROCHLORIDE 5 MG/1
5 TABLET, FILM COATED ORAL NIGHTLY
Qty: 90 TABLET | Refills: 1 | Status: SHIPPED | OUTPATIENT
Start: 2022-04-11 | End: 2022-10-11

## 2022-04-11 NOTE — TELEPHONE ENCOUNTER
Keo Edmonds needs refill of   Requested Prescriptions     Pending Prescriptions Disp Refills    famotidine (PEPCID) 40 MG tablet [Pharmacy Med Name: Famotidine 40 MG Oral Tablet] 90 tablet 0     Sig: TAKE 1 TABLET BY MOUTH ONCE DAILY IN THE EVENING    levocetirizine (XYZAL) 5 MG tablet [Pharmacy Med Name: Levocetirizine Dihydrochloride 5 MG Oral Tablet] 90 tablet 0     Sig: Take 1 tablet by mouth nightly       Last Filled on:      Last Visit Date:  10/25/2021    Next Visit Date:  Visit date not found

## 2022-04-13 ENCOUNTER — NURSE ONLY (OUTPATIENT)
Dept: LAB | Age: 47
End: 2022-04-13

## 2022-04-13 DIAGNOSIS — E88.81 INSULIN RESISTANCE: ICD-10-CM

## 2022-04-13 DIAGNOSIS — E11.65 CONTROLLED TYPE 2 DIABETES MELLITUS WITH HYPERGLYCEMIA, WITHOUT LONG-TERM CURRENT USE OF INSULIN (HCC): ICD-10-CM

## 2022-04-13 LAB
AVERAGE GLUCOSE: 120 MG/DL (ref 70–126)
CREATININE, URINE: 54.6 MG/DL
HBA1C MFR BLD: 6 % (ref 4.4–6.4)
MICROALBUMIN UR-MCNC: 3.28 MG/DL
MICROALBUMIN/CREAT UR-RTO: 60 MG/G (ref 0–30)

## 2022-04-15 ENCOUNTER — TELEPHONE (OUTPATIENT)
Dept: FAMILY MEDICINE CLINIC | Age: 47
End: 2022-04-15

## 2022-04-15 DIAGNOSIS — E11.65 CONTROLLED TYPE 2 DIABETES MELLITUS WITH HYPERGLYCEMIA, WITHOUT LONG-TERM CURRENT USE OF INSULIN (HCC): Primary | ICD-10-CM

## 2022-04-15 DIAGNOSIS — F32.89 OTHER DEPRESSION: ICD-10-CM

## 2022-04-15 DIAGNOSIS — L40.50 PSORIATIC ARTHRITIS (HCC): ICD-10-CM

## 2022-04-15 DIAGNOSIS — R80.9 MICROALBUMINURIA: ICD-10-CM

## 2022-04-15 DIAGNOSIS — E88.81 INSULIN RESISTANCE: ICD-10-CM

## 2022-04-15 DIAGNOSIS — I10 HYPERTENSION, UNSPECIFIED TYPE: ICD-10-CM

## 2022-04-15 RX ORDER — NYSTATIN 100000 [USP'U]/G
POWDER TOPICAL
Qty: 60 G | Refills: 2 | Status: SHIPPED | OUTPATIENT
Start: 2022-04-15 | End: 2022-09-09

## 2022-04-15 RX ORDER — FLUCONAZOLE 100 MG/1
100 TABLET ORAL DAILY
Qty: 7 TABLET | Refills: 0 | Status: SHIPPED | OUTPATIENT
Start: 2022-04-15 | End: 2022-04-22

## 2022-04-15 RX ORDER — LISINOPRIL AND HYDROCHLOROTHIAZIDE 12.5; 1 MG/1; MG/1
TABLET ORAL
Qty: 90 TABLET | Refills: 1 | Status: SHIPPED | OUTPATIENT
Start: 2022-04-15 | End: 2022-06-29 | Stop reason: SDUPTHER

## 2022-04-15 RX ORDER — ERTUGLIFLOZIN 15 MG/1
TABLET, FILM COATED ORAL
Qty: 90 TABLET | Refills: 1 | Status: SHIPPED | OUTPATIENT
Start: 2022-04-15 | End: 2022-04-18 | Stop reason: SINTOL

## 2022-04-15 NOTE — TELEPHONE ENCOUNTER
----- Message from THU Hart CNP sent at 4/15/2022  9:52 AM EDT -----  A1c improved. Now at 6%. Showing a little protein in urine. Will continue to monitor. Be sure to keep blood pressure and blood sugar in check. Recheck in 6 months.

## 2022-04-15 NOTE — TELEPHONE ENCOUNTER
I copy and pasted Gina's response to the patient's lab results and sent it to the patient via My Chart.

## 2022-04-15 NOTE — TELEPHONE ENCOUNTER
I mailed the lab orders to the patient today written on top of the orders to have them done on or around 10- and hi-lited it.

## 2022-05-04 ENCOUNTER — OFFICE VISIT (OUTPATIENT)
Dept: FAMILY MEDICINE CLINIC | Age: 47
End: 2022-05-04
Payer: COMMERCIAL

## 2022-05-04 VITALS
WEIGHT: 293 LBS | BODY MASS INDEX: 45.61 KG/M2 | SYSTOLIC BLOOD PRESSURE: 136 MMHG | TEMPERATURE: 97.5 F | HEART RATE: 83 BPM | OXYGEN SATURATION: 99 % | DIASTOLIC BLOOD PRESSURE: 86 MMHG

## 2022-05-04 DIAGNOSIS — J01.10 ACUTE NON-RECURRENT FRONTAL SINUSITIS: Primary | ICD-10-CM

## 2022-05-04 PROCEDURE — G8417 CALC BMI ABV UP PARAM F/U: HCPCS | Performed by: NURSE PRACTITIONER

## 2022-05-04 PROCEDURE — 99213 OFFICE O/P EST LOW 20 MIN: CPT | Performed by: NURSE PRACTITIONER

## 2022-05-04 PROCEDURE — 1036F TOBACCO NON-USER: CPT | Performed by: NURSE PRACTITIONER

## 2022-05-04 PROCEDURE — G8427 DOCREV CUR MEDS BY ELIG CLIN: HCPCS | Performed by: NURSE PRACTITIONER

## 2022-05-04 RX ORDER — METHYLPREDNISOLONE 4 MG/1
TABLET ORAL
Qty: 1 KIT | Refills: 0 | Status: SHIPPED | OUTPATIENT
Start: 2022-05-04 | End: 2022-05-10

## 2022-05-04 RX ORDER — CEFDINIR 300 MG/1
600 CAPSULE ORAL DAILY
Qty: 20 CAPSULE | Refills: 0 | Status: SHIPPED | OUTPATIENT
Start: 2022-05-04 | End: 2022-05-14

## 2022-05-04 ASSESSMENT — ENCOUNTER SYMPTOMS
WHEEZING: 0
SWOLLEN GLANDS: 1
COUGH: 1
RHINORRHEA: 1
SHORTNESS OF BREATH: 0
SINUS PRESSURE: 1
VOMITING: 0
SINUS PAIN: 1
CHEST TIGHTNESS: 0
SORE THROAT: 1
EYES NEGATIVE: 1

## 2022-05-04 NOTE — PATIENT INSTRUCTIONS
Patient Education        Sinusitis: Care Instructions  Your Care Instructions     Sinusitis is an infection of the lining of the sinus cavities in your head. Sinusitis often follows a cold. It causes pain and pressure in your head andface. In most cases, sinusitis gets better on its own in 1 to 2 weeks. But some mildsymptoms may last for several weeks. Sometimes antibiotics are needed. Follow-up care is a key part of your treatment and safety. Be sure to make and go to all appointments, and call your doctor if you are having problems. It's also a good idea to know your test results and keep alist of the medicines you take. How can you care for yourself at home?  Take an over-the-counter pain medicine, such as acetaminophen (Tylenol), ibuprofen (Advil, Motrin), or naproxen (Aleve). Read and follow all instructions on the label.  If the doctor prescribed antibiotics, take them as directed. Do not stop taking them just because you feel better. You need to take the full course of antibiotics.  Be careful when taking over-the-counter cold or flu medicines and Tylenol at the same time. Many of these medicines have acetaminophen, which is Tylenol. Read the labels to make sure that you are not taking more than the recommended dose. Too much acetaminophen (Tylenol) can be harmful.  Breathe warm, moist air from a steamy shower, a hot bath, or a sink filled with hot water. Avoid cold, dry air. Using a humidifier in your home may help. Follow the directions for cleaning the machine.  Use saline (saltwater) nasal washes. This can help keep your nasal passages open and wash out mucus and bacteria. You can buy saline nose drops at a grocery store or drugstore. Or you can make your own at home by adding 1 teaspoon of salt and 1 teaspoon of baking soda to 2 cups of distilled water. If you make your own, fill a bulb syringe with the solution, insert the tip into your nostril, and squeeze gently. Izora Booty your nose.    Put a hot, wet towel or a warm gel pack on your face 3 or 4 times a day for 5 to 10 minutes each time.  Try a decongestant nasal spray like oxymetazoline (Afrin). Do not use it for more than 3 days in a row. Using it for more than 3 days can make your congestion worse. When should you call for help? Call your doctor now or seek immediate medical care if:     You have new or worse swelling or redness in your face or around your eyes.      You have a new or higher fever. Watch closely for changes in your health, and be sure to contact your doctor if:     You have new or worse facial pain.      The mucus from your nose becomes thicker (like pus) or has new blood in it.      You are not getting better as expected. Where can you learn more? Go to https://Load DynamiXpeelizabetheweb.Binpress. org and sign in to your [employer] account. Enter R902 in the [employer] box to learn more about \"Sinusitis: Care Instructions. \"     If you do not have an account, please click on the \"Sign Up Now\" link. Current as of: September 8, 2021               Content Version: 13.2  © 6096-2263 Healthwise, Incorporated. Care instructions adapted under license by [facility] ([facility]). If you have questions about a medical condition or this instruction, always ask your healthcare professional. [address] any warranty or liability for your use of this information.

## 2022-05-04 NOTE — PROGRESS NOTES
[address]  Dept: BRUNA Coyne (:  1975) is a 55 y.o. female,Established patient, here for evaluation of the following chief complaint(s):  Otalgia (left)      ASSESSMENT/PLAN:  I have reviewed the patient's medical history in detail and updated the computerized patient record. HPI/ROS per the patient and caregiver. Overall non toxic in appearance. Answers questions appropriately. Conditions discussed and addressed this visit include:     Drink lots of fluids  Take Motrin or Tylenol for headache  Humidification of air  Use nasal spray as directed  Take a nasal decongestant as directed  Monitor for any fever increased and sinus pain or pressure  Follow up in 5-7 days if no improvement. 1. Acute non-recurrent frontal sinusitis  -     cefdinir (OMNICEF) 300 MG capsule; Take 2 capsules by mouth daily for 10 days, Disp-20 capsule, R-0Normal  -     methylPREDNISolone (MEDROL, JOSÉ,) 4 MG tablet; Take by mouth., Disp-1 kit, R-0Normal      Return if symptoms worsen or fail to improve, for Results review, Medication refill. SUBJECTIVE/OBJECTIVE:  Otalgia   There is pain in the left ear. This is a new problem. The current episode started in the past 7 days. The problem occurs constantly. The problem has been gradually worsening. There has been no fever. The pain is at a severity of 8/10. The pain is moderate. Associated symptoms include coughing, headaches, neck pain, rhinorrhea and a sore throat. Pertinent negatives include no ear discharge, hearing loss, rash or vomiting. She has tried NSAIDs and acetaminophen for the symptoms. The treatment provided no relief. Sinusitis  This is a new problem. The problem has been gradually worsening since onset. There has been no fever. Her pain is at a severity of 7/10. Associated symptoms include congestion, coughing, ear pain, headaches, neck pain, sinus pressure, a sore throat and swollen glands. Pertinent negatives include no shortness of breath or sneezing. Past treatments include acetaminophen and sitting up. The treatment provided no relief.  Here for ear pain     Review of Systems   Constitutional: Positive for activity change, appetite change and fatigue. Negative for fever. HENT: Positive for congestion, ear pain, postnasal drip, rhinorrhea, sinus pressure, sinus pain and sore throat. Negative for ear discharge, hearing loss and sneezing. Eyes: Negative. Respiratory: Positive for cough. Negative for chest tightness, shortness of breath and wheezing. Cardiovascular: Negative for chest pain and leg swelling. Gastrointestinal: Negative for vomiting. Endocrine: Negative. Genitourinary: Negative. Musculoskeletal: Positive for neck pain. Skin: Negative. Negative for rash. Allergic/Immunologic: Positive for environmental allergies. Neurological: Positive for headaches. Hematological: Negative for adenopathy. Does not bruise/bleed easily. Psychiatric/Behavioral: Negative. Physical Exam  Vitals and nursing note reviewed. Constitutional:       Appearance: Normal appearance. She is normal weight. She is ill-appearing. HENT:      Head: Normocephalic. Right Ear: Ear canal and external ear normal. Tympanic membrane is injected and bulging. Left Ear: Ear canal and external ear normal. Tympanic membrane is injected, erythematous and bulging. Nose: Mucosal edema, congestion and rhinorrhea present. Rhinorrhea is purulent. Right Turbinates: Swollen. Left Turbinates: Swollen. Left Sinus: Maxillary sinus tenderness and frontal sinus tenderness present. Mouth/Throat:      Mouth: Mucous membranes are dry. Pharynx: Pharyngeal swelling and posterior oropharyngeal erythema present. Tonsils: No tonsillar exudate. Eyes:      General: No scleral icterus.      Conjunctiva/sclera: Conjunctivae normal.   Cardiovascular:      Rate and Rhythm: Normal rate and regular rhythm. Pulses: Normal pulses. Heart sounds: Normal heart sounds. Pulmonary:      Effort: Pulmonary effort is normal.      Breath sounds: No wheezing, rhonchi or rales. Abdominal:      General: Abdomen is flat. Palpations: Abdomen is soft. Musculoskeletal:         General: Normal range of motion. Cervical back: Normal range of motion and neck supple. Tenderness present. Lymphadenopathy:      Cervical: Cervical adenopathy ( left anterior) present. Skin:     General: Skin is warm and dry. Capillary Refill: Capillary refill takes less than 2 seconds. Coloration: Skin is not pale. Neurological:      General: No focal deficit present. Mental Status: She is alert and oriented to person, place, and time. Mental status is at baseline. Psychiatric:         Mood and Affect: Mood normal.         Behavior: Behavior normal.         Thought Content: Thought content normal.                 An electronic signature was used to authenticate this note.     --Maggie Holguin, APRCARI - CNP

## 2022-06-20 ENCOUNTER — PATIENT MESSAGE (OUTPATIENT)
Dept: FAMILY MEDICINE CLINIC | Age: 47
End: 2022-06-20

## 2022-06-20 NOTE — TELEPHONE ENCOUNTER
From: Titi Santiago  To: Luis Manuel Askew  Sent: 6/20/2022 1:38 PM EDT  Subject: Varinder River 6.21.22 appointment     Hi [facility] staff! Thank you for the call/voicemail from Anjana Daniel about needing to reschedule. Id prefer a face to face appointment rescheduled please as I have a paper that needs signed. Sorry that your office is having to deal with water issues. Ill wait to hear back from your office rather than possibly messing things up by scheduling through the sujey.    Take care,  [facility]

## 2022-06-29 ENCOUNTER — PATIENT MESSAGE (OUTPATIENT)
Dept: FAMILY MEDICINE CLINIC | Age: 47
End: 2022-06-29

## 2022-06-29 ENCOUNTER — OFFICE VISIT (OUTPATIENT)
Dept: FAMILY MEDICINE CLINIC | Age: 47
End: 2022-06-29
Payer: COMMERCIAL

## 2022-06-29 VITALS
DIASTOLIC BLOOD PRESSURE: 86 MMHG | OXYGEN SATURATION: 98 % | SYSTOLIC BLOOD PRESSURE: 128 MMHG | WEIGHT: 293 LBS | HEART RATE: 81 BPM | BODY MASS INDEX: 46.47 KG/M2 | TEMPERATURE: 97.7 F

## 2022-06-29 DIAGNOSIS — F32.89 OTHER DEPRESSION: ICD-10-CM

## 2022-06-29 DIAGNOSIS — I10 HYPERTENSION, UNSPECIFIED TYPE: ICD-10-CM

## 2022-06-29 DIAGNOSIS — E11.65 CONTROLLED TYPE 2 DIABETES MELLITUS WITH HYPERGLYCEMIA, WITHOUT LONG-TERM CURRENT USE OF INSULIN (HCC): ICD-10-CM

## 2022-06-29 DIAGNOSIS — R06.81 BREATHLESSNESS: ICD-10-CM

## 2022-06-29 DIAGNOSIS — R06.2 WHEEZING: Primary | ICD-10-CM

## 2022-06-29 DIAGNOSIS — E88.81 INSULIN RESISTANCE: ICD-10-CM

## 2022-06-29 PROCEDURE — 3044F HG A1C LEVEL LT 7.0%: CPT | Performed by: NURSE PRACTITIONER

## 2022-06-29 PROCEDURE — 1036F TOBACCO NON-USER: CPT | Performed by: NURSE PRACTITIONER

## 2022-06-29 PROCEDURE — 99214 OFFICE O/P EST MOD 30 MIN: CPT | Performed by: NURSE PRACTITIONER

## 2022-06-29 PROCEDURE — G8427 DOCREV CUR MEDS BY ELIG CLIN: HCPCS | Performed by: NURSE PRACTITIONER

## 2022-06-29 PROCEDURE — 2022F DILAT RTA XM EVC RTNOPTHY: CPT | Performed by: NURSE PRACTITIONER

## 2022-06-29 PROCEDURE — G8417 CALC BMI ABV UP PARAM F/U: HCPCS | Performed by: NURSE PRACTITIONER

## 2022-06-29 RX ORDER — METHYLPREDNISOLONE 4 MG/1
TABLET ORAL
Qty: 1 KIT | Refills: 0 | Status: SHIPPED | OUTPATIENT
Start: 2022-06-29 | End: 2022-07-05

## 2022-06-29 RX ORDER — LISINOPRIL AND HYDROCHLOROTHIAZIDE 12.5; 1 MG/1; MG/1
TABLET ORAL
Qty: 90 TABLET | Refills: 1 | Status: SHIPPED | OUTPATIENT
Start: 2022-06-29

## 2022-06-29 RX ORDER — CEFADROXIL 500 MG/1
500 CAPSULE ORAL 2 TIMES DAILY
Qty: 20 CAPSULE | Refills: 0 | Status: SHIPPED | OUTPATIENT
Start: 2022-06-29 | End: 2022-07-09

## 2022-06-29 RX ORDER — FLUCONAZOLE 100 MG/1
100 TABLET ORAL DAILY
Qty: 5 TABLET | Refills: 0 | Status: SHIPPED | OUTPATIENT
Start: 2022-06-29 | End: 2022-07-04

## 2022-06-29 RX ORDER — SERTRALINE HYDROCHLORIDE 100 MG/1
100 TABLET, FILM COATED ORAL DAILY
Qty: 90 TABLET | Refills: 3 | Status: SHIPPED | OUTPATIENT
Start: 2022-06-29

## 2022-06-29 RX ORDER — ALBUTEROL SULFATE 90 UG/1
2 AEROSOL, METERED RESPIRATORY (INHALATION) 4 TIMES DAILY PRN
Qty: 18 G | Refills: 3 | Status: SHIPPED | OUTPATIENT
Start: 2022-06-29

## 2022-06-29 ASSESSMENT — PATIENT HEALTH QUESTIONNAIRE - PHQ9
4. FEELING TIRED OR HAVING LITTLE ENERGY: 0
SUM OF ALL RESPONSES TO PHQ QUESTIONS 1-9: 2
SUM OF ALL RESPONSES TO PHQ QUESTIONS 1-9: 2
6. FEELING BAD ABOUT YOURSELF - OR THAT YOU ARE A FAILURE OR HAVE LET YOURSELF OR YOUR FAMILY DOWN: 0
8. MOVING OR SPEAKING SO SLOWLY THAT OTHER PEOPLE COULD HAVE NOTICED. OR THE OPPOSITE, BEING SO FIGETY OR RESTLESS THAT YOU HAVE BEEN MOVING AROUND A LOT MORE THAN USUAL: 0
9. THOUGHTS THAT YOU WOULD BE BETTER OFF DEAD, OR OF HURTING YOURSELF: 0
SUM OF ALL RESPONSES TO PHQ QUESTIONS 1-9: 2
SUM OF ALL RESPONSES TO PHQ QUESTIONS 1-9: 2
2. FEELING DOWN, DEPRESSED OR HOPELESS: 0
7. TROUBLE CONCENTRATING ON THINGS, SUCH AS READING THE NEWSPAPER OR WATCHING TELEVISION: 1
1. LITTLE INTEREST OR PLEASURE IN DOING THINGS: 0
5. POOR APPETITE OR OVEREATING: 1
10. IF YOU CHECKED OFF ANY PROBLEMS, HOW DIFFICULT HAVE THESE PROBLEMS MADE IT FOR YOU TO DO YOUR WORK, TAKE CARE OF THINGS AT HOME, OR GET ALONG WITH OTHER PEOPLE: 0
3. TROUBLE FALLING OR STAYING ASLEEP: 0
SUM OF ALL RESPONSES TO PHQ9 QUESTIONS 1 & 2: 0

## 2022-06-29 ASSESSMENT — ENCOUNTER SYMPTOMS
EYES NEGATIVE: 1
CHEST TIGHTNESS: 0
VOMITING: 0
SORE THROAT: 1
COUGH: 1
SINUS PRESSURE: 1
SINUS PAIN: 1
SHORTNESS OF BREATH: 0
RHINORRHEA: 1
WHEEZING: 0

## 2022-06-29 NOTE — PROGRESS NOTES
[address]  Dept: BRUNA Coyne (:  1975) is a 55 y.o. female,Established patient, here for evaluation of the following chief complaint(s):  Sinus Problem (x 2 weeks), Hypertension (check), Discuss Medications, and Forms (for massage)      ASSESSMENT/PLAN:  I have reviewed the patient's medical history in detail and updated the computerized patient record. HPI/ROS per the patient and caregiver. Overall non toxic in appearance. Answers questions appropriately. Conditions discussed and addressed this visit include:     Ongoing issues with URI symptoms and wheezing/breathlessness. Not responding to albuterol  Treat URI  PFT /bronchial challenge  Meds updated  Reviewed previous labs. The patient is advised to begin progressive daily aerobic exercise program, follow a low fat, low cholesterol diet, attempt to lose weight, reduce salt in diet and cooking, reduce exposure to stress, improve dietary compliance, use calcium 1 gram daily with Vit D, continue current medications, continue current healthy lifestyle patterns and return for routine annual checkups. 1. Wheezing  -     Full PFT Study With Bronchodilator; Future  -     Bronchial Challenge; Future  -     cefadroxil (DURICEF) 500 MG capsule; Take 1 capsule by mouth 2 times daily for 10 days, Disp-20 capsule, R-0Normal  -     methylPREDNISolone (MEDROL, JOSÉ,) 4 MG tablet; Take by mouth., Disp-1 kit, R-0Normal  2. Breathlessness  -     Full PFT Study With Bronchodilator; Future  -     Bronchial Challenge; Future  -     cefadroxil (DURICEF) 500 MG capsule; Take 1 capsule by mouth 2 times daily for 10 days, Disp-20 capsule, R-0Normal  -     methylPREDNISolone (MEDROL, JOSÉ,) 4 MG tablet; Take by mouth., Disp-1 kit, R-0Normal  3. Other depression  -     sertraline (ZOLOFT) 100 MG tablet; Take 1 tablet by mouth daily, Disp-90 tablet, R-3Normal  4.  Controlled type 2 diabetes mellitus with hyperglycemia, without long-term current use of insulin (HCC)  -     metFORMIN (GLUCOPHAGE) 500 MG tablet; TAKE 1 TABLET BY MOUTH TWICE DAILY WITH MEALS, Disp-180 tablet, R-1Normal  5. Insulin resistance  -     metFORMIN (GLUCOPHAGE) 500 MG tablet; TAKE 1 TABLET BY MOUTH TWICE DAILY WITH MEALS, Disp-180 tablet, R-1Normal  6. Hypertension, unspecified type  -     lisinopril-hydroCHLOROthiazide (PRINZIDE;ZESTORETIC) 10-12.5 MG per tablet; Take 1 tablet by mouth once daily, Disp-90 tablet, R-1Normal      Return in about 6 months (around 12/29/2022) for Results review, Routine follow up. SUBJECTIVE/OBJECTIVE:  HPI     Review of Systems   Constitutional: Positive for activity change, appetite change and fatigue. Negative for fever. HENT: Positive for congestion, ear pain, postnasal drip, rhinorrhea, sinus pressure, sinus pain and sore throat. Negative for ear discharge, hearing loss and sneezing. Eyes: Negative. Respiratory: Positive for cough. Negative for chest tightness, shortness of breath and wheezing. Cardiovascular: Negative for chest pain and leg swelling. Gastrointestinal: Negative for vomiting. Endocrine: Negative. Genitourinary: Negative. Musculoskeletal: Positive for neck pain. Skin: Negative. Negative for rash. Allergic/Immunologic: Positive for environmental allergies. Neurological: Positive for headaches. Hematological: Negative for adenopathy. Does not bruise/bleed easily. Psychiatric/Behavioral: Negative. Physical Exam  Vitals and nursing note reviewed. Constitutional:       Appearance: Normal appearance. She is normal weight. She is ill-appearing. HENT:      Head: Normocephalic. Right Ear: Ear canal and external ear normal. Tympanic membrane is injected and bulging. Left Ear: Ear canal and external ear normal. Tympanic membrane is injected, erythematous and bulging. Nose: Mucosal edema, congestion and rhinorrhea present.  Rhinorrhea is purulent. Right Turbinates: Swollen. Left Turbinates: Swollen. Left Sinus: Maxillary sinus tenderness and frontal sinus tenderness present. Mouth/Throat:      Mouth: Mucous membranes are dry. Pharynx: Pharyngeal swelling present. No posterior oropharyngeal erythema. Tonsils: No tonsillar exudate. Eyes:      General: No scleral icterus. Conjunctiva/sclera: Conjunctivae normal.   Cardiovascular:      Rate and Rhythm: Normal rate and regular rhythm. Pulses: Normal pulses. Heart sounds: Normal heart sounds. Pulmonary:      Effort: Pulmonary effort is normal.      Breath sounds: Wheezing (bilateral upper lobes) present. No rhonchi or rales. Abdominal:      General: Abdomen is flat. Palpations: Abdomen is soft. Musculoskeletal:         General: Normal range of motion. Cervical back: Normal range of motion and neck supple. No tenderness. Lymphadenopathy:      Cervical: Cervical adenopathy present. Skin:     General: Skin is warm and dry. Capillary Refill: Capillary refill takes less than 2 seconds. Coloration: Skin is not pale. Neurological:      General: No focal deficit present. Mental Status: She is alert and oriented to person, place, and time. Mental status is at baseline. Psychiatric:         Mood and Affect: Mood normal.         Behavior: Behavior normal.         Thought Content: Thought content normal.                 An electronic signature was used to authenticate this note.     --Perla Hernandez, APRN - CNP

## 2022-06-29 NOTE — TELEPHONE ENCOUNTER
From: Burley Hamman  To: Radha Vee  Sent: 6/29/2022 2:59 PM EDT  Subject: I forgot to ask for Diflucan since Ill have an antibiotic     Hi Alistair Tao,   Thank you for your time today at my appointment! I forgot to ask for diflucan to take along with the antibiotic please. Would you be able to call that in as well please? Thank you for your consideration!   Take care,   [facility]

## 2022-07-01 PROBLEM — E11.65 CONTROLLED TYPE 2 DIABETES MELLITUS WITH HYPERGLYCEMIA, WITHOUT LONG-TERM CURRENT USE OF INSULIN (HCC): Status: ACTIVE | Noted: 2022-07-01

## 2022-07-12 ENCOUNTER — OFFICE VISIT (OUTPATIENT)
Dept: PULMONOLOGY | Age: 47
End: 2022-07-12
Payer: COMMERCIAL

## 2022-07-12 VITALS
WEIGHT: 293 LBS | HEART RATE: 84 BPM | OXYGEN SATURATION: 98 % | DIASTOLIC BLOOD PRESSURE: 82 MMHG | TEMPERATURE: 97.8 F | BODY MASS INDEX: 44.41 KG/M2 | SYSTOLIC BLOOD PRESSURE: 128 MMHG | HEIGHT: 68 IN

## 2022-07-12 DIAGNOSIS — Z99.89 OBSTRUCTIVE SLEEP APNEA ON CPAP: Primary | ICD-10-CM

## 2022-07-12 DIAGNOSIS — G47.33 OBSTRUCTIVE SLEEP APNEA ON CPAP: Primary | ICD-10-CM

## 2022-07-12 DIAGNOSIS — E66.01 MORBID OBESITY WITH BMI OF 45.0-49.9, ADULT (HCC): ICD-10-CM

## 2022-07-12 PROCEDURE — G8427 DOCREV CUR MEDS BY ELIG CLIN: HCPCS | Performed by: PHYSICIAN ASSISTANT

## 2022-07-12 PROCEDURE — G8417 CALC BMI ABV UP PARAM F/U: HCPCS | Performed by: PHYSICIAN ASSISTANT

## 2022-07-12 PROCEDURE — 1036F TOBACCO NON-USER: CPT | Performed by: PHYSICIAN ASSISTANT

## 2022-07-12 PROCEDURE — 99213 OFFICE O/P EST LOW 20 MIN: CPT | Performed by: PHYSICIAN ASSISTANT

## 2022-07-12 ASSESSMENT — ENCOUNTER SYMPTOMS
EYES NEGATIVE: 1
WHEEZING: 0
NAUSEA: 0
COUGH: 0
ALLERGIC/IMMUNOLOGIC NEGATIVE: 1
CHEST TIGHTNESS: 0
SHORTNESS OF BREATH: 0
STRIDOR: 0
BACK PAIN: 0
DIARRHEA: 0

## 2022-07-12 NOTE — PROGRESS NOTES
[facility]      Matt Iglesias         [id number]  7/12/2022   Chief Complaint   Patient presents with    Follow-up     1 year VICKY follow up         Pt of Elian Michel     PAP Download:   Original or initial AHI: 23     Date of initial study: 03/13/2010      Compliant  100%     Noncompliant 0 %     PAP Type Cpap   Level  15 cmH2O    Avg Hrs/Day 7 hours 29 minutes   AHI: 2.6   Recorded compliance dates , 06/12/2022  to 07/11/2022  Machine/Mfg:   [] ResMed    [x] Respironics/Dreamstation   Interface:   [] Nasal    [] Nasal pillows   [x] FFM      Provider:      [x] [facility]     [][employer]     [] [employer]    [] Johnathan Mcdonald    [] [facility]               [] [facility]      [] [employer]    [] [address]      [] Other    Neck Size: 19  Mallampati Mallampati 2  ESS:  8  SAQLI: 83    Here is a scan of the most recent download:            Presentation:   [place] presents for sleep medicine follow up for obstructive sleep apnea  Since the last visit, Isaias alatorre is doing well with PAP. Her PAP is getting loud and she started wearing her husbands old PAP. Equipment issues: The pressure is  acceptable, the mask is acceptable     Sleep issues:  Do you feel better? Yes  More rested? Yes   Better concentration? yes    Progress History:   Since last visit any new medical issues? No  New ER or hospital visits? No  Any new or changes in medicines? No  Any new sleep medicines? No    Review of Systems -   Review of Systems   Constitutional: Negative for activity change, appetite change, chills and fever. HENT: Negative for congestion and postnasal drip. Eyes: Negative. Respiratory: Negative for cough, chest tightness, shortness of breath, wheezing and stridor. Cardiovascular: Negative for chest pain and leg swelling. Gastrointestinal: Negative for diarrhea and nausea. Endocrine: Negative. Genitourinary: Negative. Musculoskeletal: Negative.   Negative for arthralgias and back pain.   Skin: Negative. Allergic/Immunologic: Negative. Neurological: Negative. Negative for dizziness and light-headedness. Psychiatric/Behavioral: Negative. All other systems reviewed and are negative. Physical Exam:    BMI:  Body mass index is 47.44 kg/m². Wt Readings from Last 3 Encounters:   07/12/22 (!) 312 lb (141.5 kg)   06/29/22 (!) 305 lb 9.6 oz (138.6 kg)   05/04/22 300 lb (136.1 kg)     Weight lost 13 lbs over 1 year  Vitals: /82   Pulse 84   Temp 97.8 °F (36.6 °C)   Ht 5' 8\" (1.727 m)   Wt (!) 312 lb (141.5 kg)   SpO2 98% Comment: r/a  BMI 47.44 kg/m²       Physical Exam  Constitutional:       General: She is not in acute distress. Appearance: Normal appearance. She is normal weight. She is not ill-appearing. HENT:      Head: Normocephalic and atraumatic. Nose: Nose normal.   Eyes:      Extraocular Movements: Extraocular movements intact. Pupils: Pupils are equal, round, and reactive to light. Pulmonary:      Effort: Pulmonary effort is normal.   Neurological:      Mental Status: She is alert and oriented to person, place, and time. Psychiatric:         Attention and Perception: Attention and perception normal.         Mood and Affect: Mood and affect normal.         Speech: Speech normal.         Behavior: Behavior normal.         Thought Content: Thought content normal.         Cognition and Memory: Cognition and memory normal.         Judgment: Judgment normal.           ASSESSMENT/DIAGNOSIS     Diagnosis Orders   1. Obstructive sleep apnea on CPAP     2. Morbid obesity with BMI of 45.0-49.9, adult [facility])              Plan   Do you need any equipment today? Yes She  needs replacement machine. Their machine is obsolete and loud. - Download reviewed and discussed with patient  - She  was advised to continue current positive airway pressure therapy with above described pressure.    - She  advised to keep good compliance with current recommended pressure to get optimal results and clinical improvement  - Recommend 7-9 hours of sleep with PAP  - She was advised to call [employer] regarding supplies if needed.   -She call my office for earlier appointment if needed for worsening of sleep symptoms.   - She was instructed on weight loss  - Jessika Jimenez was educated about my impression and plan. Patient verbalizesunderstanding.   We will see Elmer Duke back in: 1 year with download    Information added by my medical assistant/LPN was reviewed today         Janie Serrano PA-C, MPAS  7/12/2022

## 2022-08-23 ENCOUNTER — OFFICE VISIT (OUTPATIENT)
Dept: FAMILY MEDICINE CLINIC | Age: 47
End: 2022-08-23
Payer: COMMERCIAL

## 2022-08-23 VITALS
TEMPERATURE: 97.7 F | HEART RATE: 88 BPM | BODY MASS INDEX: 47.23 KG/M2 | DIASTOLIC BLOOD PRESSURE: 86 MMHG | WEIGHT: 293 LBS | SYSTOLIC BLOOD PRESSURE: 138 MMHG | OXYGEN SATURATION: 99 %

## 2022-08-23 DIAGNOSIS — J30.89 SEASONAL ALLERGIC RHINITIS DUE TO FUNGAL SPORES: Primary | ICD-10-CM

## 2022-08-23 DIAGNOSIS — R05.9 COUGH: ICD-10-CM

## 2022-08-23 PROBLEM — J30.2 SEASONAL ALLERGIC RHINITIS DUE TO FUNGAL SPORES: Status: ACTIVE | Noted: 2022-08-23

## 2022-08-23 LAB
Lab: NORMAL
QC PASS/FAIL: NORMAL
SARS-COV-2 RDRP RESP QL NAA+PROBE: NEGATIVE

## 2022-08-23 PROCEDURE — 87635 SARS-COV-2 COVID-19 AMP PRB: CPT | Performed by: NURSE PRACTITIONER

## 2022-08-23 PROCEDURE — 99213 OFFICE O/P EST LOW 20 MIN: CPT | Performed by: NURSE PRACTITIONER

## 2022-08-23 PROCEDURE — G8427 DOCREV CUR MEDS BY ELIG CLIN: HCPCS | Performed by: NURSE PRACTITIONER

## 2022-08-23 PROCEDURE — G8417 CALC BMI ABV UP PARAM F/U: HCPCS | Performed by: NURSE PRACTITIONER

## 2022-08-23 PROCEDURE — 1036F TOBACCO NON-USER: CPT | Performed by: NURSE PRACTITIONER

## 2022-08-23 RX ORDER — BENZONATATE 200 MG/1
200 CAPSULE ORAL 3 TIMES DAILY PRN
Qty: 30 CAPSULE | Refills: 0 | Status: SHIPPED | OUTPATIENT
Start: 2022-08-23

## 2022-08-23 SDOH — ECONOMIC STABILITY: FOOD INSECURITY: WITHIN THE PAST 12 MONTHS, THE FOOD YOU BOUGHT JUST DIDN'T LAST AND YOU DIDN'T HAVE MONEY TO GET MORE.: NEVER TRUE

## 2022-08-23 SDOH — ECONOMIC STABILITY: FOOD INSECURITY: WITHIN THE PAST 12 MONTHS, YOU WORRIED THAT YOUR FOOD WOULD RUN OUT BEFORE YOU GOT MONEY TO BUY MORE.: NEVER TRUE

## 2022-08-23 ASSESSMENT — ENCOUNTER SYMPTOMS
COUGH: 1
SINUS PRESSURE: 1
CHEST TIGHTNESS: 1
SINUS PAIN: 0
EYES NEGATIVE: 1
GASTROINTESTINAL NEGATIVE: 1
SHORTNESS OF BREATH: 0
RHINORRHEA: 1

## 2022-08-23 ASSESSMENT — SOCIAL DETERMINANTS OF HEALTH (SDOH): HOW HARD IS IT FOR YOU TO PAY FOR THE VERY BASICS LIKE FOOD, HOUSING, MEDICAL CARE, AND HEATING?: NOT HARD AT ALL

## 2022-08-23 NOTE — PROGRESS NOTES
[address]  Dept: BRUNA Coyne (:  1975) is a 52 y.o. female,Established patient, here for evaluation of the following chief complaint(s):  Cough (Wants to check because she has a test coming up)      ASSESSMENT/PLAN:  I have reviewed the patient's medical history in detail and updated the computerized patient record. HPI/ROS per the patient and caregiver. Overall non toxic in appearance. Answers questions appropriately. Conditions discussed and addressed this visit include:   Here for acute allergies. Most likely weather induced  Cannot add anything to her regime due to upcoming asthma testing  Covid test is negative  Will await results on testing   Consider steroid shot after testing, or medrol pack  Continue to push fluids. 1. Seasonal allergic rhinitis due to fungal spores  -     benzonatate (TESSALON) 200 MG capsule; Take 1 capsule by mouth 3 times daily as needed for Cough, Disp-30 capsule, R-0Normal  2. Cough  -     POCT COVID-19 Rapid, NAAT  -     benzonatate (TESSALON) 200 MG capsule; Take 1 capsule by mouth 3 times daily as needed for Cough, Disp-30 capsule, R-0Normal    Return if symptoms worsen or fail to improve. SUBJECTIVE/OBJECTIVE:  Cough  Associated symptoms include headaches, myalgias, postnasal drip and rhinorrhea. Pertinent negatives include no chest pain, fever or shortness of breath. Her past medical history is significant for environmental allergies. Here for cough and congestion  X 7 days  No fever  Clear nasal drainage  Coughing due to pnd. Review of Systems   Constitutional:  Positive for fatigue. Negative for activity change, appetite change and fever. HENT:  Positive for congestion, postnasal drip, rhinorrhea and sinus pressure. Negative for sinus pain. Eyes: Negative. Respiratory:  Positive for cough and chest tightness. Negative for shortness of breath.     Cardiovascular:  Negative for chest pain and leg swelling. Gastrointestinal: Negative. Endocrine: Negative. Genitourinary: Negative. Musculoskeletal:  Positive for arthralgias and myalgias. Skin: Negative. Allergic/Immunologic: Positive for environmental allergies. Neurological:  Positive for headaches. Negative for weakness and light-headedness. Hematological:  Negative for adenopathy. Does not bruise/bleed easily. Psychiatric/Behavioral: Negative. Physical Exam  Vitals and nursing note reviewed. Constitutional:       Appearance: Normal appearance. She is normal weight. She is not ill-appearing. HENT:      Head: Normocephalic. Right Ear: Tympanic membrane, ear canal and external ear normal.      Left Ear: Tympanic membrane, ear canal and external ear normal.      Nose: Congestion and rhinorrhea (clear) present. Mouth/Throat:      Mouth: Mucous membranes are dry. Pharynx: No posterior oropharyngeal erythema. Eyes:      Conjunctiva/sclera: Conjunctivae normal.   Cardiovascular:      Rate and Rhythm: Regular rhythm. Tachycardia present. Pulses: Normal pulses. Heart sounds: Normal heart sounds. Pulmonary:      Effort: Pulmonary effort is normal.      Breath sounds: No wheezing, rhonchi or rales. Abdominal:      General: Abdomen is flat. Palpations: Abdomen is soft. Musculoskeletal:         General: Normal range of motion. Cervical back: Normal range of motion and neck supple. No tenderness. Lymphadenopathy:      Cervical: No cervical adenopathy. Skin:     General: Skin is warm and dry. Capillary Refill: Capillary refill takes less than 2 seconds. Coloration: Skin is not pale. Neurological:      General: No focal deficit present. Mental Status: She is alert and oriented to person, place, and time. Mental status is at baseline. Psychiatric:         Mood and Affect: Mood normal.         Behavior: Behavior normal.         Thought Content:  Thought content normal. An electronic signature was used to authenticate this note.     --Paige Luna, THU - CNP

## 2022-08-26 ENCOUNTER — HOSPITAL ENCOUNTER (OUTPATIENT)
Dept: PULMONOLOGY | Age: 47
Discharge: HOME | End: 2022-08-26
Payer: COMMERCIAL

## 2022-08-26 DIAGNOSIS — R06.2 WHEEZING: ICD-10-CM

## 2022-08-26 DIAGNOSIS — R06.81 BREATHLESSNESS: ICD-10-CM

## 2022-08-26 PROCEDURE — 6360000002 HC RX W HCPCS

## 2022-08-26 PROCEDURE — 94726 PLETHYSMOGRAPHY LUNG VOLUMES: CPT

## 2022-08-26 PROCEDURE — 94070 EVALUATION OF WHEEZING: CPT

## 2022-08-26 PROCEDURE — 94010 BREATHING CAPACITY TEST: CPT

## 2022-08-26 PROCEDURE — 94729 DIFFUSING CAPACITY: CPT

## 2022-08-26 PROCEDURE — 95070 INHLJ BRNCL CHALLENGE TSTG: CPT

## 2022-08-30 ENCOUNTER — TELEPHONE (OUTPATIENT)
Dept: CARDIOLOGY CLINIC | Age: 47
End: 2022-08-30

## 2022-08-30 ENCOUNTER — TELEPHONE (OUTPATIENT)
Dept: FAMILY MEDICINE CLINIC | Age: 47
End: 2022-08-30

## 2022-08-30 DIAGNOSIS — L40.50 PSORIATIC ARTHRITIS (HCC): ICD-10-CM

## 2022-08-30 DIAGNOSIS — R06.81 BREATHLESSNESS: ICD-10-CM

## 2022-08-30 DIAGNOSIS — R05.9 COUGH: Primary | ICD-10-CM

## 2022-08-30 DIAGNOSIS — I10 HYPERTENSION, UNSPECIFIED TYPE: ICD-10-CM

## 2022-08-30 DIAGNOSIS — R06.2 WHEEZING: ICD-10-CM

## 2022-08-30 NOTE — TELEPHONE ENCOUNTER
LM for pt to return call.     [facility] Clinical Support Pool  Est of eduard, last seen 08-19-21, cant see mortimer been more than a yr since seen MD, no soon openings with eduard/ana     Diagnosis   R05.9 (ICD-10-CM) - Cough   R06.2 (ICD-10-CM) - Wheezing   R06.81 (ICD-10-CM) - Breathlessness   I10 (ICD-10-CM) - Hypertension, unspecified type   L40.50 (ICD-10-CM) - Psoriatic arthritis ([facility] [address]

## 2022-08-30 NOTE — TELEPHONE ENCOUNTER
----- Message from THU Rubio CNP sent at 8/30/2022  9:47 AM EDT -----  PFt and bronchial challenge show no restrictive or obstructive lung disease. Results indicate she may have some pulmonary hypertension which is most likely related to her rheumatoid disease. Would like to refer her to cardiology for further assessment and recommendations.

## 2022-09-09 ENCOUNTER — OFFICE VISIT (OUTPATIENT)
Dept: CARDIOLOGY CLINIC | Age: 47
End: 2022-09-09
Payer: COMMERCIAL

## 2022-09-09 VITALS
SYSTOLIC BLOOD PRESSURE: 141 MMHG | HEIGHT: 68 IN | BODY MASS INDEX: 44.41 KG/M2 | WEIGHT: 293 LBS | HEART RATE: 79 BPM | DIASTOLIC BLOOD PRESSURE: 74 MMHG

## 2022-09-09 DIAGNOSIS — I10 PRIMARY HYPERTENSION: Primary | ICD-10-CM

## 2022-09-09 DIAGNOSIS — R06.02 SOB (SHORTNESS OF BREATH): ICD-10-CM

## 2022-09-09 PROCEDURE — G8427 DOCREV CUR MEDS BY ELIG CLIN: HCPCS | Performed by: INTERNAL MEDICINE

## 2022-09-09 PROCEDURE — 99214 OFFICE O/P EST MOD 30 MIN: CPT | Performed by: INTERNAL MEDICINE

## 2022-09-09 PROCEDURE — 93000 ELECTROCARDIOGRAM COMPLETE: CPT | Performed by: INTERNAL MEDICINE

## 2022-09-09 PROCEDURE — G8417 CALC BMI ABV UP PARAM F/U: HCPCS | Performed by: INTERNAL MEDICINE

## 2022-09-09 PROCEDURE — 1036F TOBACCO NON-USER: CPT | Performed by: INTERNAL MEDICINE

## 2022-09-09 NOTE — PROGRESS NOTES
[id number] [facility] [address] [facility] [address]  Dept: [phone]  Dept Fax: [phone]  Loc: [phone]    Visit Date: 9/9/2022    Ms. Geraldine Vasquez is a 52 y.o. female  who presented for:  Chief Complaint   Patient presents with    1 Year Follow Up    Hypertension       HPI:   56 y/o female with Psoriatic arthrtis, T2 DM, Hypertension, VICKY on CPAP, PCOS and Obesity was referred for pulmonary HTN. Patient had PFTs and bronchial challenge and apparently showed no abnormalities. She was referred for possible Pulmonary HTN. BMI is 48. Denies chest pain, palpitations lightheadedness, dizziness, orthopnea. Echo from 8/2021 showed normal LVSF, RVSF, normal RV size. Current Outpatient Medications:     benzonatate (TESSALON) 200 MG capsule, Take 1 capsule by mouth 3 times daily as needed for Cough, Disp: 30 capsule, Rfl: 0    sertraline (ZOLOFT) 100 MG tablet, Take 1 tablet by mouth daily, Disp: 90 tablet, Rfl: 3    albuterol sulfate HFA (VENTOLIN HFA) 108 (90 Base) MCG/ACT inhaler, Inhale 2 puffs into the lungs 4 times daily as needed for Wheezing, Disp: 18 g, Rfl: 3    metFORMIN (GLUCOPHAGE) 500 MG tablet, TAKE 1 TABLET BY MOUTH TWICE DAILY WITH MEALS, Disp: 180 tablet, Rfl: 1    lisinopril-hydroCHLOROthiazide (PRINZIDE;ZESTORETIC) 10-12.5 MG per tablet, Take 1 tablet by mouth once daily, Disp: 90 tablet, Rfl: 1    famotidine (PEPCID) 40 MG tablet, TAKE 1 TABLET BY MOUTH ONCE DAILY IN THE EVENING, Disp: 90 tablet, Rfl: 1    levocetirizine (XYZAL) 5 MG tablet, Take 1 tablet by mouth nightly, Disp: 90 tablet, Rfl: 1    ZINC PO, Take 50 mg by mouth daily, Disp: , Rfl:     ABATACEPT IV, Infuse 1,000 mg intravenously every 28 days , Disp: , Rfl:     bumetanide (BUMEX) 1 MG tablet, Take 1 tablet by mouth daily (Patient taking differently: Take 1 mg by mouth See Admin Instructions Takes on her days off work.  Works as a teacher M-F), Disp: 90 tablet, Rfl: 1    potassium chloride (KLOR-CON M) 20 MEQ extended release tablet, Take 1 tablet by mouth daily (Patient taking differently: Take 20 mEq by mouth See Admin Instructions Takes on the days she is off work. Works as a teacher M-F), Disp: 90 tablet, Rfl: 1    Cholecalciferol (VITAMIN D3) 1000 units TABS, Take 1 tablet by mouth daily, Disp: , Rfl:     Lutein-Zeaxanthin 25-5 MG CAPS, Take by mouth daily , Disp: , Rfl:     diphenhydrAMINE (BENADRYL) 25 MG tablet, Take 25 mg by mouth every 6 hours as needed for Itching, Disp: , Rfl:     Ascorbic Acid (VITAMIN C) 500 MG CAPS, Take 1,000 mg by mouth daily , Disp: , Rfl:     Omega-3 Fatty Acids (OMEGA 3 PO), Take by mouth 2 times daily 4648-4816 mg, Disp: , Rfl:     Multiple Vitamins-Minerals (MULTIVITAL-M PO), Take by mouth daily , Disp: , Rfl:     acetaminophen (TYLENOL) 325 MG tablet, Take 650 mg by mouth 2 times daily , Disp: , Rfl:     folic acid (FOLVITE) 1 MG tablet, Take 1 mg by mouth daily. , Disp: , Rfl:     methotrexate, PF, (RHEUMATREX) 25 MG/ML chemo injection, Inject 25 mg into the muscle once a week , Disp: , Rfl:     Past Medical History  Mar Vrea  has a past medical history of Arthritis, Hypertension, Mild depression ([address] and PCOS (polycystic ovarian syndrome). Social History  Mar Vera  reports that she has never smoked. She has never used smokeless tobacco. She reports that she does not drink alcohol and does not use drugs. Family History  Mar Vera family history includes Arthritis in her father; Cancer in her mother. Past Surgical History   Past Surgical History:   Procedure Laterality Date    WISDOM TOOTH EXTRACTION         Subjective:     REVIEW OF SYSTEMS  Constitutional: denies sweats, chills and fever  HENT: denies  congestion, sinus pressure, sneezing and sore throat. Eyes: denies  pain, discharge, redness and itching.    Respiratory: denies apnea, cough  Gastrointestinal: denies blood in stool, constipation, diarrhea Endocrine: denies cold intolerance, heat intolerance, polydipsia. Genitourinary: denies dysuria, enuresis, flank pain and hematuria. Musculoskeletal: denies arthralgias, joint swelling and neck pain. Neurological: denies numbness and headaches. Psychiatric/Behavioral: denies agitation, confusion, decreased concentration and dysphoric mood    All others reviewed and are negative. Objective:     BP (!) 141/74   Pulse 79   Ht 5' 7.5\" (1.715 m)   Wt (!) 309 lb 12.8 oz (140.5 kg)   BMI 47.81 kg/m²     Wt Readings from Last 3 Encounters:   09/09/22 (!) 309 lb 12.8 oz (140.5 kg)   08/23/22 (!) 310 lb 9.6 oz (140.9 kg)   07/12/22 (!) 312 lb (141.5 kg)     BP Readings from Last 3 Encounters:   09/09/22 (!) 141/74   08/23/22 138/86   07/12/22 128/82       PHYSICAL EXAM  Constitutional: Oriented to person, place, and time. Appears well-developed and well-nourished. HENT:   Head: Normocephalic and atraumatic. Eyes: EOM are normal. Pupils are equal, round, and reactive to light. Neck: Normal range of motion. Neck supple. No JVD present. Cardiovascular: Normal rate , normal heart sounds and intact distal pulses. Pulmonary/Chest: Effort normal and breath sounds normal. No respiratory distress. No wheezes. No rales. Abdominal: Soft. Bowel sounds are normal. No distension. There is no tenderness. Musculoskeletal: Normal range of motion. No edema. Neurological: Alert and oriented to person, place, and time. No cranial nerve deficit. Coordination normal.   Skin: Skin is warm and dry. Psychiatric: Normal mood and affect.        No results found for: CKTOTAL, CKMB, CKMBINDEX    Lab Results   Component Value Date/Time    WBC 9.6 08/17/2022 12:23 PM    RBC 4.40 08/17/2022 12:23 PM    RBC 4.22 05/07/2012 04:10 PM    HGB 12.0 08/17/2022 12:23 PM    HCT 37.7 08/17/2022 12:23 PM    MCV 85.7 08/17/2022 12:23 PM    MCH 27.3 08/17/2022 12:23 PM    MCHC 31.8 08/17/2022 12:23 PM    RDW 15.7 08/23/2021 10:17 AM  08/17/2022 12:23 PM    MPV 9.9 08/17/2022 12:23 PM       Lab Results   Component Value Date/Time     09/10/2021 08:53 AM    K 3.7 09/10/2021 08:53 AM     09/10/2021 08:53 AM    CO2 25 09/10/2021 08:53 AM    BUN 11 09/10/2021 08:53 AM    LABALBU 4.2 07/07/2021 11:33 AM    LABALBU 4.4 05/07/2012 04:10 PM    CREATININE 0.5 08/17/2022 12:23 PM    CALCIUM 8.10 09/10/2021 08:53 AM    LABGLOM >90 08/17/2022 12:23 PM    GLUCOSE 122 09/10/2021 08:53 AM       Lab Results   Component Value Date/Time    ALKPHOS 73 08/04/2020 10:37 AM    ALT 30 08/17/2022 12:23 PM    AST 32 08/17/2022 12:23 PM    PROT 7.4 08/04/2020 10:37 AM    BILITOT 0.3 08/04/2020 10:37 AM    LABALBU 4.2 07/07/2021 11:33 AM    LABALBU 4.4 05/07/2012 04:10 PM       No results found for: MG    Lab Results   Component Value Date    INR 0.92 08/23/2021    PROTIME 10.6 08/23/2021         Lab Results   Component Value Date/Time    LABA1C 6.0 04/13/2022 10:04 AM       Lab Results   Component Value Date/Time    TRIG 257 12/18/2018 02:29 PM    HDL 32 12/18/2018 02:29 PM    LDLCALC 67 12/18/2018 02:29 PM       Lab Results   Component Value Date/Time    TSH 2.190 07/23/2021 09:44 AM         Testing Reviewed:      I haveindividually reviewed the below cardiac tests    EKG: Normal sinus rhythm  Low voltage QRS, consider pulmonary disease, pericardial effusion, or normal variant  Cannot rule out Anterior infarct , age undetermined    ECHO: Results for orders placed during the hospital encounter of 08/16/21    ECHO Complete 2D W Doppler W Color    Narrative  Transthoracic Echocardiography Report (TTE)    Demographics    Patient Name   Jerry Logan Gender              Female    MR #           [id number]     Race                    Ethnicity    Account #      [de-identified]     Room Number    Accession      [id number]    Date of Study       08/16/2021  Number    Date of Birth  1975    Referring Physician Krystle Ang Dante  CNP    Age            39 year(s)    [facility]    Interpreting        Rick Begum MD  Physician    Procedure    Type of Study    TTE procedure:ECHOCARDIOGRAM COMPLETE 2D W DOPPLER W COLOR. Procedure Date  Date: 08/16/2021 Start: 11:22 AM    Study Location: Echo Lab  Technical Quality: Limited visualization due to body habitus. Indications:Abnormal ECG and Preop cardiac evaluation. Additional Medical History:VICKY, Hypertension, Arthritis, SOB, Edema    Patient Status: Routine    Height: 67.72 inches Weight: 328.01 pounds BSA: 2.51 m^2 BMI: 50.29 kg/m^2    BP: 128/78 mmHg    Conclusions    Summary  Technically difficult examination. Left ventricular size and systolic function is normal. Ejection fraction  was estimated at 55-60%. LV wall thickness is within normal limits. The right ventricular size appears normal with normal systolic function  and wall thickness. Signature    ----------------------------------------------------------------  Electronically signed by Rick Begum MD (Interpreting  physician) on 08/17/2021 at 04:06 PM  ----------------------------------------------------------------    Findings    Mitral Valve  The mitral valve structure is normal with normal leaflet separation. DOPPLER: The transmitral velocity was within the normal range with no  evidence for mitral stenosis. There was no evidence of mitral  regurgitation. Aortic Valve  The aortic valve appears trileaflet with normal thickness and leaflet  excursion. DOPPLER: Transaortic velocity was within the normal range with  no evidence of aortic stenosis. There was no evidence of aortic  regurgitation. Tricuspid Valve  The tricuspid valve structure is normal with normal leaflet separation. DOPPLER: There is no evidence of tricuspid stenosis. There was no evidence  of tricuspid regurgitation. Pulmonic Valve  The pulmonic valve was not well visualized .     Left Atrium  Left atrial size is normal.    Left Ventricle  Left ventricular size and systolic function is normal. Ejection fraction  was estimated at 55-60%. LV wall thickness is within normal limits. Right Atrium  Right atrial size was normal.    Right Ventricle  The right ventricular size appears normal with normal systolic function  and wall thickness. Pericardial Effusion  The pericardium appears normal with no evidence of a pericardial effusion. Pleural Effusion  No evidence of pleural effusion. Aorta / Great Vessels  -Aortic root dimension within normal limits. -IVC size is within normal limits with normal respiratory phasic changes.     M-Mode/2D Measurements & Calculations    LV Diastolic    LV Systolic Dimension:    AV Cusp Separation: 2.1 cmLA  Dimension: 5.2  3.4 cm                    Dimension: 3.3 cmAO Root  cm              LV Volume Diastolic: 204  Dimension: 3 cmLA Area: 18.2  LV FS:34.6 %    ml                        cm^2  LV PW           LV Volume Systolic: 57.2  Diastolic: 0.8  ml  cm              LV EDV/LV EDV Index: 130  Septum          ml/52 m^2LV ESV/LV ESV    RV Diastolic Dimension: 2.7 cm  Diastolic: 0.8  Index: 79.7 ml/19 m^2  cm              EF Calculated: 63.5 %     LA/Aorta: 1.1    LA volume/Index: 49.4 ml /20m^2    Doppler Measurements & Calculations    MV Peak E-Wave: 87 cm/s     AV Peak Velocity: 183  LVOT Peak Velocity: 111  MV Peak A-Wave: 67.3 cm/s   cm/s                   cm/s  MV E/A Ratio: 1.29          AV Peak Gradient: 13.4 LVOT Peak Gradient: 5  MV Peak Gradient: 3.03 mmHg mmHg                   mmHg    MV Deceleration Time: 236                          TV Peak E-Wave: 56.6  msec                                               cm/s  MV P1/2t: 69 msec                                  TV Peak A-Wave: 39 cm/s  MVA by PHT:3.19 cm^2        IVRT: 60 msec  TV Peak Gradient: 1.28  MV E' Septal Velocity: 6.5                         mmHg  cm/s                        AV DVI (Vmax):0.61  MV A' Septal Velocity: 7.1                         PV Peak Velocity: 84.4  cm/s                                               cm/s  MV E' Lateral Velocity:                            PV Peak Gradient: 2.85  12.9 cm/s                                          mmHg  MV A' Lateral Velocity: 9.8  cm/s  E/E' septal: 13.38  E/E' lateral: 6.74    [email or web address]  [id number]      STRESS:    CATH:    Assessment/Plan       Diagnosis Orders   1. Primary hypertension  EKG 12 Lead      2. SOB (shortness of breath)  EKG 12 Lead        Shortness of breath  VICKY on CPAP  Hypertension  DM  Obesity BMI 48    States she is intermittently sob  This is likley due to VICKY/Obesity  Last 8/2021 echo showed normal LVSF, RVSF, RV size and function  Will recheck Echo and ProBNP  If these are normal, consider pulmonary evaluation  Compliant with CPAP  But using her husbands CPAP  The patient is asked to make an attempt to improve diet and exercise patterns to aid in medical management of this problem. Advised more plant based nutrition/meditarrean diet   Advised patient to call office or seek immediate medical attention if there is any new onset of  any chest pain, sob, palpitations, lightheadedness, dizziness, orthopnea, PND or pedal edema. All medication side effects were discussed in details. Thank you for allowing me to participate in the care of this patient. Please do not hesitate to contact me for any further questions. Return in about 1 year (around 9/9/2023), or if symptoms worsen or fail to improve, for Review testing, Regular follow up.        Electronically signed by Patricia Riggs MD   9/9/2022 at 10:42 AM EDT

## 2022-09-14 ENCOUNTER — HOSPITAL ENCOUNTER (OUTPATIENT)
Dept: NON INVASIVE DIAGNOSTICS | Age: 47
Discharge: HOME | End: 2022-09-14
Payer: COMMERCIAL

## 2022-09-14 ENCOUNTER — NURSE ONLY (OUTPATIENT)
Dept: LAB | Age: 47
End: 2022-09-14

## 2022-09-14 DIAGNOSIS — R06.02 SOB (SHORTNESS OF BREATH): ICD-10-CM

## 2022-09-14 DIAGNOSIS — I10 PRIMARY HYPERTENSION: ICD-10-CM

## 2022-09-14 LAB
LV EF: 60 %
LVEF MODALITY: NORMAL
PRO-BNP: 31.6 PG/ML (ref 0–450)

## 2022-09-14 PROCEDURE — 93306 TTE W/DOPPLER COMPLETE: CPT

## 2022-09-22 PROBLEM — R05.9 COUGH: Status: RESOLVED | Noted: 2022-08-23 | Resolved: 2022-09-22

## 2022-10-07 DIAGNOSIS — R09.82 POST-NASAL DRIP: ICD-10-CM

## 2022-10-07 DIAGNOSIS — J32.1 CHRONIC FRONTAL SINUSITIS: ICD-10-CM

## 2022-10-07 DIAGNOSIS — R11.10 MORNING VOMITING: ICD-10-CM

## 2022-10-07 DIAGNOSIS — E11.65 CONTROLLED TYPE 2 DIABETES MELLITUS WITH HYPERGLYCEMIA, WITHOUT LONG-TERM CURRENT USE OF INSULIN (HCC): ICD-10-CM

## 2022-10-07 DIAGNOSIS — E88.81 INSULIN RESISTANCE: ICD-10-CM

## 2022-10-11 RX ORDER — FAMOTIDINE 40 MG/1
TABLET, FILM COATED ORAL
Qty: 90 TABLET | Refills: 1 | Status: SHIPPED | OUTPATIENT
Start: 2022-10-11

## 2022-10-11 RX ORDER — LEVOCETIRIZINE DIHYDROCHLORIDE 5 MG/1
5 TABLET, FILM COATED ORAL NIGHTLY
Qty: 90 TABLET | Refills: 1 | Status: SHIPPED | OUTPATIENT
Start: 2022-10-11

## 2022-10-11 NOTE — TELEPHONE ENCOUNTER
Sarmad Man needs refill of   Requested Prescriptions     Pending Prescriptions Disp Refills    levocetirizine (XYZAL) 5 MG tablet [Pharmacy Med Name: Levocetirizine Dihydrochloride 5 MG Oral Tablet] 90 tablet 0     Sig: Take 1 tablet by mouth nightly    famotidine (PEPCID) 40 MG tablet [Pharmacy Med Name: Famotidine 40 MG Oral Tablet] 90 tablet 0     Sig: TAKE 1 TABLET BY MOUTH ONCE DAILY IN THE EVENING       Last Filled on:  04- #90 R-1 and sent to [address] in [place], [place] by Messi Bae CNP.        Last Visit Date:  08/23/2022    Next Visit Date:  Visit date not found

## 2022-10-29 ENCOUNTER — NURSE ONLY (OUTPATIENT)
Dept: LAB | Age: 47
End: 2022-10-29

## 2022-10-29 DIAGNOSIS — L40.50 PSORIATIC ARTHRITIS (HCC): ICD-10-CM

## 2022-10-29 DIAGNOSIS — I10 HYPERTENSION, UNSPECIFIED TYPE: ICD-10-CM

## 2022-10-29 DIAGNOSIS — R80.9 MICROALBUMINURIA: ICD-10-CM

## 2022-10-29 DIAGNOSIS — E11.65 CONTROLLED TYPE 2 DIABETES MELLITUS WITH HYPERGLYCEMIA, WITHOUT LONG-TERM CURRENT USE OF INSULIN (HCC): ICD-10-CM

## 2022-10-29 LAB
ALBUMIN SERPL-MCNC: 4.3 G/DL (ref 3.5–5.1)
ALP BLD-CCNC: 77 U/L (ref 38–126)
ALT SERPL-CCNC: 13 U/L (ref 11–66)
ANION GAP SERPL CALCULATED.3IONS-SCNC: 8 MEQ/L (ref 8–16)
AST SERPL-CCNC: 14 U/L (ref 5–40)
AVERAGE GLUCOSE: 129 MG/DL (ref 70–126)
BASOPHILS # BLD: 0.4 %
BASOPHILS ABSOLUTE: 0 THOU/MM3 (ref 0–0.1)
BILIRUB SERPL-MCNC: 0.3 MG/DL (ref 0.3–1.2)
BUN BLDV-MCNC: 14 MG/DL (ref 7–22)
CALCIUM SERPL-MCNC: 9.2 MG/DL (ref 8.5–10.5)
CHLORIDE BLD-SCNC: 97 MEQ/L (ref 98–111)
CHOLESTEROL, TOTAL: 167 MG/DL (ref 100–199)
CO2: 30 MEQ/L (ref 23–33)
CREAT SERPL-MCNC: 0.6 MG/DL (ref 0.4–1.2)
CREATININE, URINE: 55.1 MG/DL
EOSINOPHIL # BLD: 1.6 %
EOSINOPHILS ABSOLUTE: 0.1 THOU/MM3 (ref 0–0.4)
ERYTHROCYTE [DISTWIDTH] IN BLOOD BY AUTOMATED COUNT: 15 % (ref 11.5–14.5)
ERYTHROCYTE [DISTWIDTH] IN BLOOD BY AUTOMATED COUNT: 45.9 FL (ref 35–45)
GFR SERPL CREATININE-BSD FRML MDRD: > 60 ML/MIN/1.73M2
GLUCOSE BLD-MCNC: 114 MG/DL (ref 70–108)
HBA1C MFR BLD: 6.3 % (ref 4.4–6.4)
HCT VFR BLD CALC: 36.7 % (ref 37–47)
HDLC SERPL-MCNC: 30 MG/DL
HEMOGLOBIN: 11.7 GM/DL (ref 12–16)
IMMATURE GRANS (ABS): 0.02 THOU/MM3 (ref 0–0.07)
IMMATURE GRANULOCYTES: 0.2 %
LDL CHOLESTEROL CALCULATED: 98 MG/DL
LYMPHOCYTES # BLD: 30.3 %
LYMPHOCYTES ABSOLUTE: 2.8 THOU/MM3 (ref 1–4.8)
MCH RBC QN AUTO: 27.3 PG (ref 26–33)
MCHC RBC AUTO-ENTMCNC: 31.9 GM/DL (ref 32.2–35.5)
MCV RBC AUTO: 85.7 FL (ref 81–99)
MICROALBUMIN UR-MCNC: 4.58 MG/DL
MICROALBUMIN/CREAT UR-RTO: 83 MG/G (ref 0–30)
MONOCYTES # BLD: 6.2 %
MONOCYTES ABSOLUTE: 0.6 THOU/MM3 (ref 0.4–1.3)
NUCLEATED RED BLOOD CELLS: 0 /100 WBC
PLATELET # BLD: 374 THOU/MM3 (ref 130–400)
PMV BLD AUTO: 10 FL (ref 9.4–12.4)
POTASSIUM SERPL-SCNC: 4.2 MEQ/L (ref 3.5–5.2)
RBC # BLD: 4.28 MILL/MM3 (ref 4.2–5.4)
SEDIMENTATION RATE, ERYTHROCYTE: 35 MM/HR (ref 0–20)
SEG NEUTROPHILS: 61.3 %
SEGMENTED NEUTROPHILS ABSOLUTE COUNT: 5.6 THOU/MM3 (ref 1.8–7.7)
SODIUM BLD-SCNC: 135 MEQ/L (ref 135–145)
TOTAL PROTEIN: 6.3 G/DL (ref 6.1–8)
TRIGL SERPL-MCNC: 196 MG/DL (ref 0–199)
TSH SERPL DL<=0.05 MIU/L-ACNC: 1.7 UIU/ML (ref 0.4–4.2)
WBC # BLD: 9.2 THOU/MM3 (ref 4.8–10.8)

## 2022-11-07 ENCOUNTER — OFFICE VISIT (OUTPATIENT)
Dept: FAMILY MEDICINE CLINIC | Age: 47
End: 2022-11-07
Payer: COMMERCIAL

## 2022-11-07 VITALS
DIASTOLIC BLOOD PRESSURE: 80 MMHG | BODY MASS INDEX: 47.99 KG/M2 | WEIGHT: 293 LBS | TEMPERATURE: 97.7 F | OXYGEN SATURATION: 99 % | SYSTOLIC BLOOD PRESSURE: 138 MMHG | HEART RATE: 85 BPM

## 2022-11-07 DIAGNOSIS — R80.9 MICROALBUMINURIA: Primary | ICD-10-CM

## 2022-11-07 DIAGNOSIS — I10 HYPERTENSION, UNSPECIFIED TYPE: ICD-10-CM

## 2022-11-07 DIAGNOSIS — I10 ESSENTIAL HYPERTENSION: ICD-10-CM

## 2022-11-07 DIAGNOSIS — E88.81 INSULIN RESISTANCE: ICD-10-CM

## 2022-11-07 DIAGNOSIS — E11.65 CONTROLLED TYPE 2 DIABETES MELLITUS WITH HYPERGLYCEMIA, WITHOUT LONG-TERM CURRENT USE OF INSULIN (HCC): ICD-10-CM

## 2022-11-07 DIAGNOSIS — R06.81 BREATHLESSNESS: ICD-10-CM

## 2022-11-07 PROCEDURE — G8427 DOCREV CUR MEDS BY ELIG CLIN: HCPCS | Performed by: NURSE PRACTITIONER

## 2022-11-07 PROCEDURE — 3078F DIAST BP <80 MM HG: CPT | Performed by: NURSE PRACTITIONER

## 2022-11-07 PROCEDURE — G8484 FLU IMMUNIZE NO ADMIN: HCPCS | Performed by: NURSE PRACTITIONER

## 2022-11-07 PROCEDURE — 2022F DILAT RTA XM EVC RTNOPTHY: CPT | Performed by: NURSE PRACTITIONER

## 2022-11-07 PROCEDURE — 99214 OFFICE O/P EST MOD 30 MIN: CPT | Performed by: NURSE PRACTITIONER

## 2022-11-07 PROCEDURE — 3074F SYST BP LT 130 MM HG: CPT | Performed by: NURSE PRACTITIONER

## 2022-11-07 PROCEDURE — 3044F HG A1C LEVEL LT 7.0%: CPT | Performed by: NURSE PRACTITIONER

## 2022-11-07 PROCEDURE — G8417 CALC BMI ABV UP PARAM F/U: HCPCS | Performed by: NURSE PRACTITIONER

## 2022-11-07 PROCEDURE — 1036F TOBACCO NON-USER: CPT | Performed by: NURSE PRACTITIONER

## 2022-11-07 RX ORDER — TIOTROPIUM BROMIDE INHALATION SPRAY 1.56 UG/1
2 SPRAY, METERED RESPIRATORY (INHALATION) DAILY
Qty: 4 G | Refills: 0 | Status: SHIPPED | OUTPATIENT
Start: 2022-11-07

## 2022-11-07 ASSESSMENT — ENCOUNTER SYMPTOMS
EYE REDNESS: 0
EYE ITCHING: 0
CHEST TIGHTNESS: 0
GASTROINTESTINAL NEGATIVE: 1
COUGH: 0
WHEEZING: 1
ALLERGIC/IMMUNOLOGIC NEGATIVE: 1
EYE PAIN: 0
SHORTNESS OF BREATH: 1

## 2022-11-07 NOTE — PROGRESS NOTES
[address]  Dept: BRUNA Coyne (:  1975) is a 52 y.o. female,Established patient, here for evaluation of the following chief complaint(s):  Discuss Labs      ASSESSMENT/PLAN:  I have reviewed the patient's medical history in detail and updated the computerized patient record. HPI/ROS per the patient and caregiver. Overall non toxic in appearance. Answers questions appropriately. Conditions discussed and addressed this visit include:   Reviewed labs  Still showing progressive proteinuria. No recent change in rheumatoid meds  Diabetes and HTN controlled  Will refer to Nephrology  Start spiriva for breathlessness  Continue meds as ordered    1. Microalbuminuria  -     Bertrand Casas MD, Nephrology, [facility]  2. Breathlessness  -     tiotropium (SPIRIVA RESPIMAT) 1.25 MCG/ACT AERS inhaler; Inhale 2 puffs into the lungs daily, Disp-4 g, R-0Normal  3. Insulin resistance  -     Bertrand Casas MD, Nephrology, [facility]  4. Controlled type 2 diabetes mellitus with hyperglycemia, without long-term current use of insulin [facility])  -     Bertrand Casas MD, Nephrology, [facility]  5. Hypertension, unspecified type  -     Bertrand Casas MD, Nephrology, [facility]  6. Essential hypertension  -     Bertrand Casas MD, Nephrology, [facility]    Return in about 4 weeks (around 2022) for Medication evaluation, Results review, Routine follow up. SUBJECTIVE/OBJECTIVE:  HPI  Here for follow up on her chronic conditions  Labs completed  Still breathless at times  No cardiac cause at this time  PFT wnl  Rheumatoid meds have not changed. Review of Systems   Constitutional:  Positive for fatigue. Negative for activity change, appetite change and fever. HENT: Negative. Eyes:  Negative for pain, redness and itching. Respiratory:  Positive for shortness of breath and wheezing. Negative for cough and chest tightness.     Cardiovascular: Negative for chest pain, palpitations and leg swelling. Gastrointestinal: Negative. Endocrine: Negative for cold intolerance. Genitourinary: Negative. Musculoskeletal:  Positive for arthralgias and joint swelling. Negative for myalgias. Skin: Negative. Allergic/Immunologic: Negative. Neurological:  Negative for dizziness, weakness and headaches. Hematological:  Negative for adenopathy. Does not bruise/bleed easily. Psychiatric/Behavioral: Negative. Physical Exam  Vitals and nursing note reviewed. Constitutional:       Appearance: She is obese. She is not ill-appearing. HENT:      Head: Normocephalic. Right Ear: Tympanic membrane, ear canal and external ear normal.      Left Ear: Tympanic membrane, ear canal and external ear normal.      Nose: Nose normal. No rhinorrhea. Mouth/Throat:      Mouth: Mucous membranes are dry. Pharynx: No posterior oropharyngeal erythema. Eyes:      Conjunctiva/sclera: Conjunctivae normal.   Neck:      Vascular: No carotid bruit. Cardiovascular:      Rate and Rhythm: Normal rate and regular rhythm. Pulses: Normal pulses. Heart sounds: Normal heart sounds. Pulmonary:      Effort: Pulmonary effort is normal.      Breath sounds: Rhonchi (few scattered rhonchi middle and upper lobes. ) present. No wheezing or rales. Abdominal:      General: Abdomen is flat. Bowel sounds are normal.   Musculoskeletal:         General: Normal range of motion. Cervical back: Normal range of motion and neck supple. No muscular tenderness. Right lower leg: No edema. Left lower leg: No edema. Skin:     General: Skin is warm and dry. Capillary Refill: Capillary refill takes less than 2 seconds. Coloration: Skin is not pale. Neurological:      General: No focal deficit present. Mental Status: She is alert and oriented to person, place, and time. Mental status is at baseline.    Psychiatric:         Mood and Affect: Mood normal. Affect is tearful ( when speaking of son and his needs for school). Speech: Speech normal.         Behavior: Behavior normal. Behavior is cooperative. Thought Content: Thought content normal.         Cognition and Memory: Cognition normal.         Judgment: Judgment normal.               An electronic signature was used to authenticate this note.     --THU Griggs - CNP

## 2022-11-08 PROBLEM — R06.81 BREATHLESSNESS: Status: ACTIVE | Noted: 2022-11-08

## 2022-11-08 PROBLEM — R80.9 MICROALBUMINURIA: Status: ACTIVE | Noted: 2022-11-08

## 2022-12-23 DIAGNOSIS — E88.81 INSULIN RESISTANCE: ICD-10-CM

## 2022-12-23 DIAGNOSIS — R06.81 BREATHLESSNESS: ICD-10-CM

## 2022-12-23 DIAGNOSIS — E11.65 CONTROLLED TYPE 2 DIABETES MELLITUS WITH HYPERGLYCEMIA, WITHOUT LONG-TERM CURRENT USE OF INSULIN (HCC): ICD-10-CM

## 2022-12-27 RX ORDER — TIOTROPIUM BROMIDE INHALATION SPRAY 1.56 UG/1
SPRAY, METERED RESPIRATORY (INHALATION)
Qty: 4 G | Refills: 3 | Status: SHIPPED | OUTPATIENT
Start: 2022-12-27

## 2022-12-27 NOTE — TELEPHONE ENCOUNTER
Jacek Guido needs refill of   Requested Prescriptions     Pending Prescriptions Disp Refills    SPIRIVA RESPIMAT 1.25 MCG/ACT AERS inhaler [Pharmacy Med Name: Spiriva Respimat 1.25 MCG/ACT Inhalation Aerosol Solution] 4 g 0     Sig: INHALE 2 SPRAY(S) BY MOUTH ONCE DAILY    metFORMIN (GLUCOPHAGE) 500 MG tablet [Pharmacy Med Name: metFORMIN HCl 500 MG Oral Tablet] 180 tablet 0     Sig: TAKE 1 TABLET BY MOUTH TWICE DAILY WITH MEALS       Last Filled on:      Last Visit Date:  11/07/2022    Next Visit Date:  Visit date not found

## 2023-01-04 ENCOUNTER — OFFICE VISIT (OUTPATIENT)
Dept: FAMILY MEDICINE CLINIC | Age: 48
End: 2023-01-04
Payer: COMMERCIAL

## 2023-01-04 VITALS
SYSTOLIC BLOOD PRESSURE: 130 MMHG | OXYGEN SATURATION: 99 % | BODY MASS INDEX: 48.73 KG/M2 | DIASTOLIC BLOOD PRESSURE: 80 MMHG | WEIGHT: 293 LBS | HEART RATE: 79 BPM | TEMPERATURE: 97.8 F

## 2023-01-04 DIAGNOSIS — R50.9 FEVER, UNSPECIFIED FEVER CAUSE: ICD-10-CM

## 2023-01-04 DIAGNOSIS — J40 BRONCHITIS: Primary | ICD-10-CM

## 2023-01-04 LAB
INFLUENZA VIRUS A RNA: NEGATIVE
INFLUENZA VIRUS B RNA: NEGATIVE
Lab: NORMAL
QC PASS/FAIL: NORMAL
SARS-COV-2 RDRP RESP QL NAA+PROBE: NEGATIVE

## 2023-01-04 PROCEDURE — G8417 CALC BMI ABV UP PARAM F/U: HCPCS | Performed by: NURSE PRACTITIONER

## 2023-01-04 PROCEDURE — 99213 OFFICE O/P EST LOW 20 MIN: CPT | Performed by: NURSE PRACTITIONER

## 2023-01-04 PROCEDURE — 1036F TOBACCO NON-USER: CPT | Performed by: NURSE PRACTITIONER

## 2023-01-04 PROCEDURE — 3075F SYST BP GE 130 - 139MM HG: CPT | Performed by: NURSE PRACTITIONER

## 2023-01-04 PROCEDURE — 3079F DIAST BP 80-89 MM HG: CPT | Performed by: NURSE PRACTITIONER

## 2023-01-04 PROCEDURE — G8484 FLU IMMUNIZE NO ADMIN: HCPCS | Performed by: NURSE PRACTITIONER

## 2023-01-04 PROCEDURE — 87635 SARS-COV-2 COVID-19 AMP PRB: CPT | Performed by: NURSE PRACTITIONER

## 2023-01-04 PROCEDURE — 87502 INFLUENZA DNA AMP PROBE: CPT | Performed by: NURSE PRACTITIONER

## 2023-01-04 PROCEDURE — G8427 DOCREV CUR MEDS BY ELIG CLIN: HCPCS | Performed by: NURSE PRACTITIONER

## 2023-01-04 RX ORDER — DEXTROMETHORPHAN HYDROBROMIDE AND PROMETHAZINE HYDROCHLORIDE 15; 6.25 MG/5ML; MG/5ML
5 SYRUP ORAL 4 TIMES DAILY PRN
Qty: 150 ML | Refills: 0 | Status: SHIPPED | OUTPATIENT
Start: 2023-01-04

## 2023-01-04 RX ORDER — FLUCONAZOLE 100 MG/1
100 TABLET ORAL DAILY
Qty: 7 TABLET | Refills: 0 | Status: SHIPPED | OUTPATIENT
Start: 2023-01-04 | End: 2023-01-11

## 2023-01-04 RX ORDER — PREDNISONE 20 MG/1
20 TABLET ORAL 2 TIMES DAILY
Qty: 10 TABLET | Refills: 0 | Status: SHIPPED | OUTPATIENT
Start: 2023-01-04 | End: 2023-01-09

## 2023-01-04 ASSESSMENT — ENCOUNTER SYMPTOMS
EYE ITCHING: 0
EYE PAIN: 0
SINUS PRESSURE: 1
SINUS PAIN: 1
GASTROINTESTINAL NEGATIVE: 1
COUGH: 1
RHINORRHEA: 1
EYE REDNESS: 0

## 2023-01-04 ASSESSMENT — PATIENT HEALTH QUESTIONNAIRE - PHQ9: DEPRESSION UNABLE TO ASSESS: PT REFUSES

## 2023-01-04 NOTE — PROGRESS NOTES
[address]  Dept: BRUNA Coyne (:  1975) is a 52 y.o. female,Established patient, here for evaluation of the following chief complaint(s):  Diarrhea (Started today and noticed she didn't feel good.) and Vaginitis (Took one dose of medication on  but feels like it is not gone.)      ASSESSMENT/PLAN:  I have reviewed the patient's medical history in detail and updated the computerized patient record. HPI/ROS per the patient and caregiver. Overall non toxic in appearance. Answers questions appropriately. Conditions discussed and addressed this visit include:   Here for acute URI symptoms. Results neg for flu and covid. Patient appears ill but non-toxic and well hydrated. Patient is to take medications as directed. Continue all home medications. Potential side effects of the medications were reviewed with the patient in detail. The patient can increase fluids. The patient can have Tylenol or Motrin for pain and fever as needed. Discussed with patient signs and symptoms that would require emergent evaluation and treatment. follow up in 5 days if no improvement. 1. Bronchitis  -     POCT COVID-19 Rapid, NAAT  -     POCT Influenza A/B DNA (Alere i)  -     predniSONE (DELTASONE) 20 MG tablet; Take 1 tablet by mouth 2 times daily for 5 days, Disp-10 tablet, R-0Normal  -     promethazine-dextromethorphan (PROMETHAZINE-DM) 6.25-15 MG/5ML syrup; Take 5 mLs by mouth 4 times daily as needed for Cough, Disp-150 mL, R-0Normal  2. Fever, unspecified fever cause  -     POCT COVID-19 Rapid, NAAT  -     POCT Influenza A/B DNA (Alere i)    Return if symptoms worsen or fail to improve, for Results review, Routine follow up.     SUBJECTIVE/OBJECTIVE:  HPI  Here for uri symptoms x 3 days  Diarrhea, feels chest is tight  Some congestion  No chest pain or sob  Fever last 3 days no more than 101    Review of Systems   Constitutional:  Positive for activity change, appetite change, fatigue and fever. HENT:  Positive for congestion, postnasal drip, rhinorrhea, sinus pressure and sinus pain. Eyes:  Negative for pain, redness and itching. Respiratory:  Positive for cough. Cardiovascular:  Negative for chest pain and leg swelling. Gastrointestinal: Negative. Endocrine: Negative for cold intolerance and heat intolerance. Genitourinary: Negative. Musculoskeletal:  Positive for myalgias. Skin:  Positive for pallor. Allergic/Immunologic: Positive for environmental allergies. Neurological:  Positive for light-headedness and headaches. Hematological:  Negative for adenopathy. Does not bruise/bleed easily. Psychiatric/Behavioral: Negative. Physical Exam  Vitals and nursing note reviewed. Constitutional:       Appearance: She is normal weight. She is ill-appearing. HENT:      Head: Normocephalic. Right Ear: Ear canal and external ear normal.      Left Ear: Ear canal and external ear normal.      Nose: Congestion and rhinorrhea (tan) present. Mouth/Throat:      Mouth: Mucous membranes are dry. Pharynx: Posterior oropharyngeal erythema present. Eyes:      Conjunctiva/sclera: Conjunctivae normal.   Cardiovascular:      Rate and Rhythm: Regular rhythm. Tachycardia present. Pulses: Normal pulses. Heart sounds: Normal heart sounds. Pulmonary:      Effort: Pulmonary effort is normal.      Breath sounds: Rhonchi (bilateral upper anterior lobes) present. Abdominal:      General: Abdomen is flat. Palpations: Abdomen is soft. Musculoskeletal:         General: Normal range of motion. Cervical back: Normal range of motion and neck supple. Tenderness present. Lymphadenopathy:      Cervical: No cervical adenopathy. Skin:     General: Skin is warm and dry. Capillary Refill: Capillary refill takes less than 2 seconds. Coloration: Skin is pale.    Neurological:      General: No focal deficit present. Mental Status: She is alert and oriented to person, place, and time. Mental status is at baseline. Psychiatric:         Mood and Affect: Mood normal.         Behavior: Behavior normal.         Thought Content: Thought content normal.               An electronic signature was used to authenticate this note.     --Jacqueline Perrin, THU - CNP

## 2023-01-04 NOTE — LETTER
[address] [address]  Phone: [phone]  Fax: [address], APRN - CNP        January 4, 2023     Patient: Darlene Brandt   YOB: 1975   Date of Visit: 1/4/2023       To Whom It May Concern: It is my medical opinion that Bernice Zamudio may return to work on 1/6/2023. If you have any questions or concerns, please don't hesitate to call.     Sincerely,        THU Spring - CNP

## 2023-01-13 ENCOUNTER — PATIENT MESSAGE (OUTPATIENT)
Dept: FAMILY MEDICINE CLINIC | Age: 48
End: 2023-01-13

## 2023-01-13 DIAGNOSIS — J40 BRONCHITIS: Primary | ICD-10-CM

## 2023-01-13 DIAGNOSIS — B37.31 VAGINA, CANDIDIASIS: Primary | ICD-10-CM

## 2023-01-13 RX ORDER — FLUCONAZOLE 150 MG/1
150 TABLET ORAL
Qty: 2 TABLET | Refills: 0 | Status: SHIPPED | OUTPATIENT
Start: 2023-01-13 | End: 2023-01-19

## 2023-01-13 RX ORDER — METHYLPREDNISOLONE 4 MG/1
TABLET ORAL
Qty: 1 KIT | Refills: 0 | Status: SHIPPED | OUTPATIENT
Start: 2023-01-13 | End: 2023-01-19

## 2023-01-13 RX ORDER — AZITHROMYCIN 250 MG/1
250 TABLET, FILM COATED ORAL SEE ADMIN INSTRUCTIONS
Qty: 6 TABLET | Refills: 0 | Status: SHIPPED | OUTPATIENT
Start: 2023-01-13 | End: 2023-01-18

## 2023-01-13 RX ORDER — BENZONATATE 200 MG/1
200 CAPSULE ORAL 3 TIMES DAILY PRN
Qty: 30 CAPSULE | Refills: 0 | Status: SHIPPED | OUTPATIENT
Start: 2023-01-13

## 2023-01-13 NOTE — TELEPHONE ENCOUNTER
From: Kristen Gamboa  To: Matilde Bryant  Sent: 1/13/2023 12:02 AM EST  Subject: Staying home on Friday, Jan. 13. ..wheeziness holding on    Hi Gina,  I hope you are doing well. You mentioned when I was in last week to let you know how things were after the 5 days of steroids. My last day taking them was Monday night. I was tired throughout the remainder of this week from not having my \"melecio\" energy that I get when I have to take steroids and being able to sleep again. I'm reaching out bc the cough has picked up some, my ears itch, and I sound wheezy as I breath. Elma Chadwick mentioned tonight that I should probably stay home and rest Friday since I sounded to him (as we were talking) like I wasn't feeling great. I hadn't planned on staying home, but considering my track record of making things worse by pushing through and working when I don't feel well, I decided to stay home. Side note: Ambrosio was home with Elma Chadwick two days this week with cough/congestion/sneezing, but he is doing better. Not sure if we are just passing our germs back and forth. Just wondering your thoughts. Thank you for your time reading this. Thank you for all you do!   Mitch Wright

## 2023-01-24 ENCOUNTER — OFFICE VISIT (OUTPATIENT)
Dept: NEPHROLOGY | Age: 48
End: 2023-01-24
Payer: COMMERCIAL

## 2023-01-24 VITALS
BODY MASS INDEX: 49.38 KG/M2 | SYSTOLIC BLOOD PRESSURE: 135 MMHG | HEART RATE: 82 BPM | OXYGEN SATURATION: 97 % | WEIGHT: 293 LBS | DIASTOLIC BLOOD PRESSURE: 75 MMHG

## 2023-01-24 DIAGNOSIS — R80.0 ISOLATED NON-NEPHROTIC PROTEINURIA: ICD-10-CM

## 2023-01-24 DIAGNOSIS — I10 ESSENTIAL HYPERTENSION: ICD-10-CM

## 2023-01-24 DIAGNOSIS — N18.2 CKD (CHRONIC KIDNEY DISEASE), STAGE II: Primary | ICD-10-CM

## 2023-01-24 PROCEDURE — G8484 FLU IMMUNIZE NO ADMIN: HCPCS | Performed by: INTERNAL MEDICINE

## 2023-01-24 PROCEDURE — 3078F DIAST BP <80 MM HG: CPT | Performed by: INTERNAL MEDICINE

## 2023-01-24 PROCEDURE — 99204 OFFICE O/P NEW MOD 45 MIN: CPT | Performed by: INTERNAL MEDICINE

## 2023-01-24 PROCEDURE — 1036F TOBACCO NON-USER: CPT | Performed by: INTERNAL MEDICINE

## 2023-01-24 PROCEDURE — G8417 CALC BMI ABV UP PARAM F/U: HCPCS | Performed by: INTERNAL MEDICINE

## 2023-01-24 PROCEDURE — 3075F SYST BP GE 130 - 139MM HG: CPT | Performed by: INTERNAL MEDICINE

## 2023-01-24 PROCEDURE — G8427 DOCREV CUR MEDS BY ELIG CLIN: HCPCS | Performed by: INTERNAL MEDICINE

## 2023-01-24 ASSESSMENT — ENCOUNTER SYMPTOMS
EYES NEGATIVE: 1
ABDOMINAL PAIN: 0
NAUSEA: 0
BACK PAIN: 0
SHORTNESS OF BREATH: 1
VOMITING: 0
DIARRHEA: 0
COUGH: 0

## 2023-01-24 NOTE — PROGRESS NOTES
[facility]         Outpatient Initial consult note         1/24/2023 2:46 PM    [address] [facility]  [address]  Dept: [phone]  Loc: [phone]      Patient Name:   Arnoldo Tracy  YOB: 1975  Primary Care Physician:  THU Wallis CNP     Chief Complaint : Evaluation of   proteinuria     History of presenting illness :Arnoldo Tracy is a 52 y.o.   female with Past Medical History as stated below was sent / referred by THU Wallis CNP forevaluation of the above problem. Patient has a history of hypertension for 23 years. Patient  is borderline diabetes  . The patient denies history of renal stones anddenies NSAID use. Patient has a history of proteinuria. Patient Never had a kidney biopsy done. Patient does not use Herbal remedies/vitamin supplements. Patient denies frequency, hematuria, hesitancy. Patient has no family history of kidney disease.   Patient follows up with the PCP who noticed elevated protein in the urine so was referred to us for further evaluation and management     Past History :     Past Medical History:   Diagnosis Date    Arthritis     Hypertension     Mild depression     PCOS (polycystic ovarian syndrome)      Past Surgical History:   Procedure Laterality Date    WISDOM TOOTH EXTRACTION       Social History     Socioeconomic History    Marital status:      Spouse name: Not on file    Number of children: Not on file    Years of education: Not on file    Highest education level: Not on file   Occupational History    Not on file   Tobacco Use    Smoking status: Never    Smokeless tobacco: Never   Vaping Use    Vaping Use: Never used   Substance and Sexual Activity    Alcohol use: No     Alcohol/week: 0.0 standard drinks    Drug use: No    Sexual activity: Yes   Other Topics Concern    Not on file   Social History Narrative    Not on file     Social Determinants of Health     Financial Resource Strain: Low Risk     Difficulty of Paying Living Expenses: Not hard at all   Food Insecurity: No Food Insecurity    Worried About Running Out of Food in the Last Year: Never true    Ran Out of Food in the Last Year: Never true   Transportation Needs: Not on file   Physical Activity: Not on file   Stress: Not on file   Social Connections: Not on file   Intimate Partner Violence: Not on file   Housing Stability: Not on file     Family History   Problem Relation Age of Onset    Cancer Mother     Arthritis Father      Medications & Allergies :      Prior to Admission medications    Medication Sig Start Date End Date Taking?  Authorizing Provider   SPIRIVA RESPIMAT 1.25 MCG/ACT AERS inhaler INHALE 2 SPRAY(S) BY MOUTH ONCE DAILY 12/27/22  Yes THU Malcolm CNP   metFORMIN (GLUCOPHAGE) 500 MG tablet TAKE 1 TABLET BY MOUTH TWICE DAILY WITH MEALS 12/27/22  Yes THU Malcolm CNP   levocetirizine (XYZAL) 5 MG tablet Take 1 tablet by mouth nightly 10/11/22  Yes THU Malcolm CNP   famotidine (PEPCID) 40 MG tablet TAKE 1 TABLET BY MOUTH ONCE DAILY IN THE EVENING 10/11/22  Yes THU Malcolm CNP   sertraline (ZOLOFT) 100 MG tablet Take 1 tablet by mouth daily 6/29/22  Yes THU Malcolm CNP   albuterol sulfate HFA (VENTOLIN HFA) 108 (90 Base) MCG/ACT inhaler Inhale 2 puffs into the lungs 4 times daily as needed for Wheezing 6/29/22  Yes THU Malcolm CNP   lisinopril-hydroCHLOROthiazide (PRINZIDE;ZESTORETIC) 10-12.5 MG per tablet Take 1 tablet by mouth once daily 6/29/22  Yes THU Johnson CNP   ZINC PO Take 50 mg by mouth daily   Yes Historical Provider, MD   ABATACEPT IV Infuse 1,000 mg intravenously every 28 days    Yes Historical Provider, MD   bumetanide (BUMEX) 1 MG tablet Take 1 tablet by mouth daily  Patient taking differently: Take 1 mg by mouth See Admin Instructions Takes on her days off work. 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  Yes THU Hoffman CNP   Cholecalciferol (VITAMIN D3) 1000 units TABS Take 1 tablet by mouth daily   Yes Historical Provider, MD   Lutein-Zeaxanthin 25-5 MG CAPS Take by mouth daily    Yes Historical Provider, MD   diphenhydrAMINE (BENADRYL) 25 MG tablet Take 25 mg by mouth every 6 hours as needed for Itching   Yes Historical Provider, MD   Ascorbic Acid (VITAMIN C) 500 MG CAPS Take 1,000 mg by mouth daily    Yes Historical Provider, MD   Omega-3 Fatty Acids (OMEGA 3 PO) Take by mouth 2 times daily 5732-1392 mg   Yes Historical Provider, MD   Multiple Vitamins-Minerals (MULTIVITAL-M PO) Take by mouth daily    Yes Historical Provider, MD   acetaminophen (TYLENOL) 325 MG tablet Take 650 mg by mouth 2 times daily    Yes Historical Provider, MD   folic acid (FOLVITE) 1 MG tablet Take 1 mg by mouth daily. Yes Historical Provider, MD   methotrexate, PF, (RHEUMATREX) 25 MG/ML chemo injection Inject 25 mg into the muscle once a week    Yes Historical Provider, MD     Allergies: Latex, Doxycycline, Amoxicillin, and Ciprofloxacin    Review of Systems Physical Exam   Review of Systems   Constitutional:  Positive for fatigue. Negative for chills and fever. HENT: Negative. Eyes: Negative. Respiratory:  Positive for shortness of breath. Negative for cough. Cardiovascular:  Positive for leg swelling. Negative for chest pain. Gastrointestinal:  Negative for abdominal pain, diarrhea, nausea and vomiting. Genitourinary:  Negative for dysuria, frequency and hematuria.    Musculoskeletal:  Negative for back pain and neck pain. Skin:  Negative for rash. Neurological:  Negative for dizziness, light-headedness and headaches. Psychiatric/Behavioral:  Negative for agitation and confusion. Physical Exam  Vitals reviewed. Constitutional:       Appearance: She is obese. HENT:      Head: Normocephalic and atraumatic. Right Ear: External ear normal.      Left Ear: External ear normal.      Nose: Nose normal.      Mouth/Throat:      Mouth: Mucous membranes are moist.   Eyes:      General: No scleral icterus. Right eye: No discharge. Left eye: No discharge. Conjunctiva/sclera: Conjunctivae normal.   Cardiovascular:      Rate and Rhythm: Normal rate and regular rhythm. Heart sounds: Normal heart sounds. Pulmonary:      Effort: Pulmonary effort is normal. No respiratory distress. Breath sounds: Normal breath sounds. No wheezing. Abdominal:      General: There is no distension. Palpations: Abdomen is soft. Tenderness: There is no abdominal tenderness. Musculoskeletal:         General: No swelling. Cervical back: Normal range of motion and neck supple. Right lower leg: No edema. Left lower leg: No edema. Skin:     General: Skin is warm and dry. Findings: No rash. Neurological:      General: No focal deficit present. Mental Status: She is alert and oriented to person, place, and time.    Psychiatric:         Mood and Affect: Mood normal.         Behavior: Behavior normal.        Vitals   Vitals:    01/24/23 1425   BP: 135/75   Site: Left Upper Arm   Position: Sitting   Cuff Size: Large Adult   Pulse: 82   SpO2: 97%   Weight: (!) 320 lb (145.2 kg)        Labs, Radiology and Tests :    Labs -    10/22           potassium 4.2           BUN 14           Creatinine 0.6           eGFR >60                       UPCR            UMCR 83                         Renal Ultrasound Scan -- will order       Other : old  lab data have been reviewed ans noted she has microalbuminuria before as well. Assessment    Renal -patient appears to be having CKD stage I mostly due to presence of protein in the urine  -I think this is most likely secondary to questionable diabetes/weight doubt any autoimmune disorder at this time.  -We will check renal ultrasound scan repeat urinalysis and total urine protein creatinine ratio  Electrolytes appear to be within normal limits  Essential hypertension running well continue current medications  Hx of psoriatic arthritis sees   Questionable diabetes/insulin resistance on metformin  Proteinuria most likely secondary to the above already on an ACE inhibitor  Medications reviewed discussed with the patient  Follow-up in 6 weeks  Plan     Orders Placed This Encounter   Procedures    US RENAL LIMITED    Protein / creatinine ratio, urine    Microalbumin / Creatinine Urine Ratio    Urinalysis with Microscopic    CBC    Comprehensive Metabolic Panel       Johnnie Blum M.D  [facility].

## 2023-01-30 ENCOUNTER — HOSPITAL ENCOUNTER (OUTPATIENT)
Dept: ULTRASOUND IMAGING | Age: 48
Discharge: HOME | End: 2023-01-30
Payer: COMMERCIAL

## 2023-01-30 DIAGNOSIS — N18.2 CKD (CHRONIC KIDNEY DISEASE), STAGE II: ICD-10-CM

## 2023-01-30 DIAGNOSIS — R80.0 ISOLATED NON-NEPHROTIC PROTEINURIA: ICD-10-CM

## 2023-01-30 PROCEDURE — 76770 US EXAM ABDO BACK WALL COMP: CPT

## 2023-01-31 ENCOUNTER — TELEPHONE (OUTPATIENT)
Dept: NEPHROLOGY | Age: 48
End: 2023-01-31

## 2023-01-31 NOTE — TELEPHONE ENCOUNTER
----- Message from Félix Campbell MD sent at 1/31/2023 11:36 AM EST -----  Please let the patient know that the kidney US scan is normal

## 2023-02-13 ENCOUNTER — OFFICE VISIT (OUTPATIENT)
Dept: FAMILY MEDICINE CLINIC | Age: 48
End: 2023-02-13

## 2023-02-13 ENCOUNTER — PATIENT MESSAGE (OUTPATIENT)
Dept: FAMILY MEDICINE CLINIC | Age: 48
End: 2023-02-13

## 2023-02-13 VITALS
WEIGHT: 293 LBS | SYSTOLIC BLOOD PRESSURE: 118 MMHG | TEMPERATURE: 97 F | BODY MASS INDEX: 48.64 KG/M2 | OXYGEN SATURATION: 98 % | HEART RATE: 87 BPM | DIASTOLIC BLOOD PRESSURE: 82 MMHG

## 2023-02-13 DIAGNOSIS — U07.1 COVID: Primary | ICD-10-CM

## 2023-02-13 DIAGNOSIS — R50.9 FEVER, UNSPECIFIED FEVER CAUSE: ICD-10-CM

## 2023-02-13 DIAGNOSIS — R60.1 GENERALIZED EDEMA: ICD-10-CM

## 2023-02-13 DIAGNOSIS — R06.81 BREATHLESSNESS: ICD-10-CM

## 2023-02-13 LAB
INFLUENZA VIRUS A RNA: NEGATIVE
INFLUENZA VIRUS B RNA: NEGATIVE
Lab: ABNORMAL
QC PASS/FAIL: ABNORMAL
SARS-COV-2 RDRP RESP QL NAA+PROBE: POSITIVE

## 2023-02-13 RX ORDER — FLUCONAZOLE 150 MG/1
150 TABLET ORAL ONCE
Qty: 2 TABLET | Refills: 0 | Status: SHIPPED | OUTPATIENT
Start: 2023-02-13 | End: 2023-02-13

## 2023-02-13 RX ORDER — PREDNISONE 20 MG/1
20 TABLET ORAL 2 TIMES DAILY
Qty: 14 TABLET | Refills: 0 | Status: SHIPPED | OUTPATIENT
Start: 2023-02-13 | End: 2023-02-20

## 2023-02-13 RX ORDER — ALBUTEROL SULFATE 90 UG/1
2 AEROSOL, METERED RESPIRATORY (INHALATION) 4 TIMES DAILY PRN
Qty: 18 G | Refills: 3 | Status: SHIPPED | OUTPATIENT
Start: 2023-02-13

## 2023-02-13 RX ORDER — POTASSIUM CHLORIDE 20 MEQ/1
20 TABLET, EXTENDED RELEASE ORAL DAILY PRN
Qty: 90 TABLET | Refills: 0 | Status: SHIPPED
Start: 2023-02-13

## 2023-02-13 RX ORDER — BUMETANIDE 1 MG/1
1 TABLET ORAL DAILY PRN
Qty: 90 TABLET | Refills: 0 | Status: SHIPPED
Start: 2023-02-13

## 2023-02-13 RX ORDER — AZITHROMYCIN 250 MG/1
250 TABLET, FILM COATED ORAL SEE ADMIN INSTRUCTIONS
Qty: 6 TABLET | Refills: 0 | Status: SHIPPED | OUTPATIENT
Start: 2023-02-13 | End: 2023-02-18

## 2023-02-13 RX ORDER — DEXTROMETHORPHAN HYDROBROMIDE AND PROMETHAZINE HYDROCHLORIDE 15; 6.25 MG/5ML; MG/5ML
5 SYRUP ORAL 4 TIMES DAILY PRN
Qty: 180 ML | Refills: 0 | Status: SHIPPED | OUTPATIENT
Start: 2023-02-13

## 2023-02-13 SDOH — ECONOMIC STABILITY: FOOD INSECURITY: WITHIN THE PAST 12 MONTHS, YOU WORRIED THAT YOUR FOOD WOULD RUN OUT BEFORE YOU GOT MONEY TO BUY MORE.: NEVER TRUE

## 2023-02-13 SDOH — ECONOMIC STABILITY: HOUSING INSECURITY
IN THE LAST 12 MONTHS, WAS THERE A TIME WHEN YOU DID NOT HAVE A STEADY PLACE TO SLEEP OR SLEPT IN A SHELTER (INCLUDING NOW)?: NO

## 2023-02-13 SDOH — ECONOMIC STABILITY: INCOME INSECURITY: HOW HARD IS IT FOR YOU TO PAY FOR THE VERY BASICS LIKE FOOD, HOUSING, MEDICAL CARE, AND HEATING?: NOT HARD AT ALL

## 2023-02-13 SDOH — ECONOMIC STABILITY: FOOD INSECURITY: WITHIN THE PAST 12 MONTHS, THE FOOD YOU BOUGHT JUST DIDN'T LAST AND YOU DIDN'T HAVE MONEY TO GET MORE.: NEVER TRUE

## 2023-02-13 ASSESSMENT — ENCOUNTER SYMPTOMS
SHORTNESS OF BREATH: 1
GASTROINTESTINAL NEGATIVE: 1
WHEEZING: 0
SINUS PRESSURE: 0
COUGH: 1
SINUS PAIN: 0
RHINORRHEA: 1
EYE ITCHING: 0
CHEST TIGHTNESS: 1
EYE REDNESS: 0
EYE PAIN: 0

## 2023-02-13 NOTE — LETTER
[address] [address]  Phone: [phone]  Fax: [address], APRN - CNP        February 13, 2023     Patient: Khalif Alanis   YOB: 1975   Date of Visit: 2/13/2023       To Whom It May Concern: It is my medical opinion that Tomasa Anderson may return to work on 02/20/2023. If you have any questions or concerns, please don't hesitate to call.     Sincerely,        HTU Villarreal - CNP

## 2023-02-13 NOTE — TELEPHONE ENCOUNTER
From: Joanna Guerrier  To: Julieta Overall  Sent: 2/13/2023 10:12 AM EST  Subject: Please send diflucan script as well    Hi Juanpablo Piña,   Thank you for seeing me today and getting things sent so quickly! I forgot to ask for diflucan to take along with the z pack please. If you have time to send that in today, Id greatly appreciate it.    Take care,  [facility]

## 2023-02-13 NOTE — PROGRESS NOTES
\\\  [address]  Dept: BRUNA Coyne (:  1975) is a 52 y.o. female,Established patient, here for evaluation of the following chief complaint(s):  Congestion (fever of 101, ears)      ASSESSMENT/PLAN:  I have reviewed the patient's medical history in detail and updated the computerized patient record. HPI/ROS per the patient and caregiver. Overall non toxic in appearance. Answers questions appropriately. Conditions discussed and addressed this visit include:   Here for acute URI symptoms x 48 hours  + covid  Covid care instructions provided  Continue albuterol every 4hours for breathlessness  Follow up in 3-5 days if no improvement. 1. COVID  -     predniSONE (DELTASONE) 20 MG tablet; Take 1 tablet by mouth 2 times daily for 7 days, Disp-14 tablet, R-0Normal  -     promethazine-dextromethorphan (PROMETHAZINE-DM) 6.25-15 MG/5ML syrup; Take 5 mLs by mouth 4 times daily as needed for Cough, Disp-180 mL, R-0Normal  -     albuterol sulfate HFA (VENTOLIN HFA) 108 (90 Base) MCG/ACT inhaler; Inhale 2 puffs into the lungs 4 times daily as needed for Wheezing, Disp-18 g, R-3Whichever albuterol pt insurance will coverNormal  2. Fever, unspecified fever cause  -     POCT COVID-19 Rapid, NAAT  -     POCT Influenza A/B DNA (Alere i)  3. Generalized edema  -     bumetanide (BUMEX) 1 MG tablet; Take 1 tablet by mouth daily as needed, Disp-90 tablet, R-0NO PRINT  -     potassium chloride (KLOR-CON M) 20 MEQ extended release tablet; Take 1 tablet by mouth daily as needed, Disp-90 tablet, R-0NO PRINT  4. Breathlessness  -     azithromycin (ZITHROMAX) 250 MG tablet; Take 1 tablet by mouth See Admin Instructions for 5 days 500mg on day 1 followed by 250mg on days 2 - 5, Disp-6 tablet, R-0Normal    Return if symptoms worsen or fail to improve, for Results review, Routine follow up. SUBJECTIVE/OBJECTIVE:  HPI  Here for URI symptoms x 2 days.    Fever, chills, back ache, short of breath with exertion  Using albuterol every 4 hours  Drinking fluids well    Review of Systems   Constitutional:  Positive for activity change, appetite change, fatigue and fever. HENT:  Positive for congestion, postnasal drip and rhinorrhea. Negative for sinus pressure and sinus pain. Eyes:  Negative for pain, redness and itching. Respiratory:  Positive for cough, chest tightness and shortness of breath. Negative for wheezing. Cardiovascular:  Negative for chest pain and leg swelling. Gastrointestinal: Negative. Endocrine: Negative for cold intolerance and heat intolerance. Genitourinary: Negative. Musculoskeletal:  Positive for myalgias. Skin:  Positive for pallor. Allergic/Immunologic: Positive for environmental allergies. Neurological:  Positive for light-headedness and headaches. Hematological:  Negative for adenopathy. Does not bruise/bleed easily. Psychiatric/Behavioral: Negative. Physical Exam  Vitals and nursing note reviewed. Constitutional:       Appearance: She is normal weight. She is ill-appearing. HENT:      Head: Normocephalic. Right Ear: Ear canal and external ear normal.      Left Ear: Ear canal and external ear normal.      Nose: Congestion present. Mouth/Throat:      Mouth: Mucous membranes are dry. Pharynx: Posterior oropharyngeal erythema present. Eyes:      Conjunctiva/sclera: Conjunctivae normal.   Cardiovascular:      Rate and Rhythm: Regular rhythm. Tachycardia present. Pulses: Normal pulses. Heart sounds: Normal heart sounds. Pulmonary:      Effort: Pulmonary effort is normal.      Breath sounds: Wheezing and rhonchi present. Abdominal:      General: Abdomen is flat. Palpations: Abdomen is soft. Musculoskeletal:         General: Normal range of motion. Cervical back: Normal range of motion and neck supple. Tenderness present. Lymphadenopathy:      Cervical: No cervical adenopathy.    Skin: General: Skin is warm and dry. Capillary Refill: Capillary refill takes less than 2 seconds. Coloration: Skin is pale. Neurological:      General: No focal deficit present. Mental Status: She is alert and oriented to person, place, and time. Mental status is at baseline. Psychiatric:         Mood and Affect: Mood normal.         Behavior: Behavior normal.         Thought Content: Thought content normal.               An electronic signature was used to authenticate this note.     --Arun Salinas, THU - CNP

## 2023-02-16 ENCOUNTER — PATIENT MESSAGE (OUTPATIENT)
Dept: FAMILY MEDICINE CLINIC | Age: 48
End: 2023-02-16

## 2023-02-16 NOTE — TELEPHONE ENCOUNTER
From: Camille Alvarenga  To: Charli Obregon  Sent: 2/16/2023 8:06 AM EST  Subject: Pink eye? Kassy Batres,  I know you wont see this until Friday, but wanted to send you a question. Is pink eye something that I could have as well as covid? My right eye has gotten progressively more red from Wednesday to today and I had some crustiness (not much though) around my eyelashes. Ive included a picture from this morning showing the difference between my two eyes. Please excuse the crazy hair and sleepiness in the pictures. Just curious.    Thanks,  [id number]

## 2023-02-17 DIAGNOSIS — I10 HYPERTENSION, UNSPECIFIED TYPE: ICD-10-CM

## 2023-02-17 RX ORDER — POLYMYXIN B SULFATE AND TRIMETHOPRIM 1; 10000 MG/ML; [USP'U]/ML
1 SOLUTION OPHTHALMIC EVERY 6 HOURS
Qty: 10 ML | Refills: 0 | Status: SHIPPED | OUTPATIENT
Start: 2023-02-17 | End: 2023-02-27

## 2023-02-20 RX ORDER — LISINOPRIL AND HYDROCHLOROTHIAZIDE 12.5; 1 MG/1; MG/1
TABLET ORAL
Qty: 90 TABLET | Refills: 1 | Status: SHIPPED | OUTPATIENT
Start: 2023-02-20

## 2023-03-04 ENCOUNTER — NURSE ONLY (OUTPATIENT)
Dept: LAB | Age: 48
End: 2023-03-04

## 2023-03-04 DIAGNOSIS — I10 ESSENTIAL HYPERTENSION: ICD-10-CM

## 2023-03-04 DIAGNOSIS — N18.2 CKD (CHRONIC KIDNEY DISEASE), STAGE II: ICD-10-CM

## 2023-03-04 DIAGNOSIS — R80.0 ISOLATED NON-NEPHROTIC PROTEINURIA: ICD-10-CM

## 2023-03-04 LAB
ALBUMIN SERPL BCG-MCNC: 4.1 G/DL (ref 3.5–5.1)
ALP SERPL-CCNC: 76 U/L (ref 38–126)
ALT SERPL W/O P-5'-P-CCNC: 14 U/L (ref 11–66)
ALT SERPL W/O P-5'-P-CCNC: 14 U/L (ref 11–66)
ANION GAP SERPL CALC-SCNC: 11 MEQ/L (ref 8–16)
AST SERPL-CCNC: 16 U/L (ref 5–40)
AST SERPL-CCNC: 16 U/L (ref 5–40)
BACTERIA: NORMAL
BASOPHILS ABSOLUTE: 0 THOU/MM3 (ref 0–0.1)
BASOPHILS NFR BLD AUTO: 0.3 %
BILIRUB SERPL-MCNC: 0.5 MG/DL (ref 0.3–1.2)
BILIRUB UR QL STRIP: NEGATIVE
BUN SERPL-MCNC: 12 MG/DL (ref 7–22)
CALCIUM SERPL-MCNC: 9.1 MG/DL (ref 8.5–10.5)
CASTS #/AREA URNS LPF: NORMAL /LPF
CASTS #/AREA URNS LPF: NORMAL /LPF
CHARACTER UR: CLEAR
CHARCOAL URNS QL MICRO: NORMAL
CHLORIDE SERPL-SCNC: 102 MEQ/L (ref 98–111)
CO2 SERPL-SCNC: 27 MEQ/L (ref 23–33)
COLOR UR: YELLOW
CREAT SERPL-MCNC: 0.5 MG/DL (ref 0.4–1.2)
CREAT SERPL-MCNC: 0.5 MG/DL (ref 0.4–1.2)
CREAT UR-MCNC: 60.6 MG/DL
CREAT UR-MCNC: 60.6 MG/DL
CRYSTALS URNS QL MICRO: NORMAL
DEPRECATED RDW RBC AUTO: 48.4 FL (ref 35–45)
DEPRECATED RDW RBC AUTO: 48.5 FL (ref 35–45)
EOSINOPHIL NFR BLD AUTO: 2.9 %
EOSINOPHILS ABSOLUTE: 0.2 THOU/MM3 (ref 0–0.4)
EPITHELIAL CELLS, UA: NORMAL /HPF
ERYTHROCYTE [DISTWIDTH] IN BLOOD BY AUTOMATED COUNT: 15.9 % (ref 11.5–14.5)
ERYTHROCYTE [DISTWIDTH] IN BLOOD BY AUTOMATED COUNT: 16 % (ref 11.5–14.5)
ERYTHROCYTE [SEDIMENTATION RATE] IN BLOOD BY WESTERGREN METHOD: 19 MM/HR (ref 0–20)
GFR SERPL CREATININE-BSD FRML MDRD: > 60 ML/MIN/1.73M2
GFR SERPL CREATININE-BSD FRML MDRD: > 60 ML/MIN/1.73M2
GLUCOSE SERPL-MCNC: 171 MG/DL (ref 70–108)
GLUCOSE UR QL STRIP.AUTO: NEGATIVE MG/DL
HCT VFR BLD AUTO: 38.8 % (ref 37–47)
HCT VFR BLD AUTO: 39.2 % (ref 37–47)
HGB BLD-MCNC: 12.3 GM/DL (ref 12–16)
HGB BLD-MCNC: 12.5 GM/DL (ref 12–16)
HGB UR QL STRIP.AUTO: NEGATIVE
IMM GRANULOCYTES # BLD AUTO: 0.02 THOU/MM3 (ref 0–0.07)
IMM GRANULOCYTES NFR BLD AUTO: 0.2 %
KETONES UR QL STRIP.AUTO: NEGATIVE
LEUKOCYTE ESTERASE UR QL STRIP.AUTO: NEGATIVE
LYMPHOCYTES ABSOLUTE: 2.5 THOU/MM3 (ref 1–4.8)
LYMPHOCYTES NFR BLD AUTO: 28.7 %
MCH RBC QN AUTO: 26.9 PG (ref 26–33)
MCH RBC QN AUTO: 27.1 PG (ref 26–33)
MCHC RBC AUTO-ENTMCNC: 31.4 GM/DL (ref 32.2–35.5)
MCHC RBC AUTO-ENTMCNC: 32.2 GM/DL (ref 32.2–35.5)
MCV RBC AUTO: 84 FL (ref 81–99)
MCV RBC AUTO: 85.6 FL (ref 81–99)
MICROALBUMIN UR-MCNC: 4.48 MG/DL
MICROALBUMIN/CREAT RATIO PNL UR: 74 MG/G (ref 0–30)
MONOCYTES ABSOLUTE: 0.5 THOU/MM3 (ref 0.4–1.3)
MONOCYTES NFR BLD AUTO: 6.2 %
NEUTROPHILS NFR BLD AUTO: 61.7 %
NITRITE UR QL STRIP.AUTO: NEGATIVE
NRBC BLD AUTO-RTO: 0 /100 WBC
PH UR STRIP.AUTO: 5.5 [PH] (ref 5–9)
PLATELET # BLD AUTO: 341 THOU/MM3 (ref 130–400)
PLATELET # BLD AUTO: 342 THOU/MM3 (ref 130–400)
PMV BLD AUTO: 10.1 FL (ref 9.4–12.4)
PMV BLD AUTO: 10.1 FL (ref 9.4–12.4)
POTASSIUM SERPL-SCNC: 4.2 MEQ/L (ref 3.5–5.2)
PROT SERPL-MCNC: 6.5 G/DL (ref 6.1–8)
PROT UR STRIP.AUTO-MCNC: NEGATIVE MG/DL
PROT UR-MCNC: 12.4 MG/DL
PROT/CREAT 24H UR: 0.2 MG/G{CREAT}
RBC # BLD AUTO: 4.58 MILL/MM3 (ref 4.2–5.4)
RBC # BLD AUTO: 4.62 MILL/MM3 (ref 4.2–5.4)
RBC #/AREA URNS HPF: NORMAL /HPF
RENAL EPI CELLS #/AREA URNS HPF: NORMAL /[HPF]
SEGMENTED NEUTROPHILS ABSOLUTE COUNT: 5.3 THOU/MM3 (ref 1.8–7.7)
SODIUM SERPL-SCNC: 140 MEQ/L (ref 135–145)
SPECIFIC GRAVITY UA: 1.01 (ref 1–1.03)
UROBILINOGEN, URINE: 0.2 EU/DL (ref 0–1)
WBC # BLD AUTO: 8.6 THOU/MM3 (ref 4.8–10.8)
WBC # BLD AUTO: 8.8 THOU/MM3 (ref 4.8–10.8)
WBC #/AREA URNS HPF: NORMAL /HPF
YEAST LIKE FUNGI URNS QL MICRO: NORMAL

## 2023-03-15 ENCOUNTER — OFFICE VISIT (OUTPATIENT)
Dept: NEPHROLOGY | Age: 48
End: 2023-03-15
Payer: COMMERCIAL

## 2023-03-15 VITALS
HEART RATE: 69 BPM | DIASTOLIC BLOOD PRESSURE: 82 MMHG | BODY MASS INDEX: 48.92 KG/M2 | WEIGHT: 293 LBS | OXYGEN SATURATION: 99 % | SYSTOLIC BLOOD PRESSURE: 133 MMHG

## 2023-03-15 DIAGNOSIS — E11.21 DIABETIC NEPHROPATHY WITH PROTEINURIA (HCC): ICD-10-CM

## 2023-03-15 DIAGNOSIS — I10 ESSENTIAL HYPERTENSION: ICD-10-CM

## 2023-03-15 DIAGNOSIS — N18.2 CKD (CHRONIC KIDNEY DISEASE), STAGE II: Primary | ICD-10-CM

## 2023-03-15 PROCEDURE — 3075F SYST BP GE 130 - 139MM HG: CPT | Performed by: INTERNAL MEDICINE

## 2023-03-15 PROCEDURE — 3079F DIAST BP 80-89 MM HG: CPT | Performed by: INTERNAL MEDICINE

## 2023-03-15 PROCEDURE — 2022F DILAT RTA XM EVC RTNOPTHY: CPT | Performed by: INTERNAL MEDICINE

## 2023-03-15 PROCEDURE — G8484 FLU IMMUNIZE NO ADMIN: HCPCS | Performed by: INTERNAL MEDICINE

## 2023-03-15 PROCEDURE — 3046F HEMOGLOBIN A1C LEVEL >9.0%: CPT | Performed by: INTERNAL MEDICINE

## 2023-03-15 PROCEDURE — G8427 DOCREV CUR MEDS BY ELIG CLIN: HCPCS | Performed by: INTERNAL MEDICINE

## 2023-03-15 PROCEDURE — 99213 OFFICE O/P EST LOW 20 MIN: CPT | Performed by: INTERNAL MEDICINE

## 2023-03-15 PROCEDURE — 1036F TOBACCO NON-USER: CPT | Performed by: INTERNAL MEDICINE

## 2023-03-15 PROCEDURE — G8417 CALC BMI ABV UP PARAM F/U: HCPCS | Performed by: INTERNAL MEDICINE

## 2023-03-15 NOTE — PROGRESS NOTES
[facility]        Outpatient Follow-Up note         3/15/2023 10:05 AM    Patient Name:   María Toussaint  YOB: 1975  Primary Care Physician:  THU Griggs - CNP   [facility] [address]  Dept: [phone]  Loc: [phone]     Chief Complaint / Reason for follow-up : Follow Up of CKD/microalbuminuria     Interval History :  Patient seen and examined by me. Feels ok. No cp or SOB.   Had COVID  last month     Past History :  Past Medical History:   Diagnosis Date    Arthritis     Hypertension     Mild depression     PCOS (polycystic ovarian syndrome)      Past Surgical History:   Procedure Laterality Date    WISDOM TOOTH EXTRACTION          Medications :     Outpatient Medications Marked as Taking for the 3/15/23 encounter (Office Visit) with Hailey Scott MD   Medication Sig Dispense Refill    lisinopril-hydroCHLOROthiazide (PRINZIDE;ZESTORETIC) 10-12.5 MG per tablet Take 1 tablet by mouth once daily 90 tablet 1    albuterol sulfate HFA (VENTOLIN HFA) 108 (90 Base) MCG/ACT inhaler Inhale 2 puffs into the lungs 4 times daily as needed for Wheezing 18 g 3    SPIRIVA RESPIMAT 1.25 MCG/ACT AERS inhaler INHALE 2 SPRAY(S) BY MOUTH ONCE DAILY 4 g 3    metFORMIN (GLUCOPHAGE) 500 MG tablet TAKE 1 TABLET BY MOUTH TWICE DAILY WITH MEALS 180 tablet 1    levocetirizine (XYZAL) 5 MG tablet Take 1 tablet by mouth nightly 90 tablet 1    famotidine (PEPCID) 40 MG tablet TAKE 1 TABLET BY MOUTH ONCE DAILY IN THE EVENING 90 tablet 1    sertraline (ZOLOFT) 100 MG tablet Take 1 tablet by mouth daily 90 tablet 3    ZINC PO Take 50 mg by mouth daily      ABATACEPT IV Infuse 1,000 mg intravenously every 28 days       Cholecalciferol (VITAMIN D3) 1000 units TABS Take 1 tablet by mouth daily      Lutein-Zeaxanthin 25-5 MG CAPS Take by mouth daily diphenhydrAMINE (BENADRYL) 25 MG tablet Take 25 mg by mouth every 6 hours as needed for Itching      Ascorbic Acid (VITAMIN C) 500 MG CAPS Take 1,000 mg by mouth daily       Omega-3 Fatty Acids (OMEGA 3 PO) Take by mouth 2 times daily 9959-1269 mg      Multiple Vitamins-Minerals (MULTIVITAL-M PO) Take by mouth daily       acetaminophen (TYLENOL) 325 MG tablet Take 650 mg by mouth 2 times daily       folic acid (FOLVITE) 1 MG tablet Take 1 mg by mouth daily. methotrexate, PF, (RHEUMATREX) 25 MG/ML chemo injection Inject 25 mg into the muscle once a week          Vitals     /82 (Site: Left Upper Arm, Position: Sitting, Cuff Size: Large Adult)   Pulse 69   Wt (!) 317 lb (143.8 kg)   SpO2 99%   BMI 48.92 kg/m²  Wt Readings from Last 3 Encounters:   03/15/23 (!) 317 lb (143.8 kg)   02/13/23 (!) 315 lb 3.2 oz (143 kg)   01/24/23 (!) 320 lb (145.2 kg)        Physical Exam     General -- no distress  Lungs -- clear  Heart -- S1, S2 heard, JVD - no  Abdomen - soft, non-tender  Extremities -- no edema  CNS - awake and alert    Labs, Radiology and Tests    Labs -    10/22 3/23          potassium 4.2 4.2          BUN 14 12          Creatinine 0.6 0.5          eGFR >60 > 60                      UPCR  200          UMCR 83 79                        Renal Ultrasound Scan -- 2/23  Right kidney 11.9 cm left kidney 12.9 cm no other abnormalities     Other : old  lab data have been reviewed ans noted she has microalbuminuria before as well.     Assessment    Renal -patient appears to be having CKD stage I mostly due to presence of protein in the urine  -I think this is most likely secondary to questionable diabetes/weight   -Renal ultrasound scan is normal having minimal microalbuminuria already on an ACE inhibitor  -Will not increase it at this time  Electrolytes appear to be within normal limits  Essential hypertension running well continue current medications  Hx of psoriatic arthritis sees   Questionable diabetes/insulin resistance on metformin okay to continue  Proteinuria most likely secondary to the above already on an ACE inhibitor  Medications reviewed discussed with the patient  Follow-up in 1 year    Tests and orders placed this Encounter     Orders Placed This Encounter   Procedures    Urinalysis with Microscopic    Protein / creatinine ratio, urine    Microalbumin / Creatinine Urine Ratio    Comprehensive Metabolic Panel       Yady Sanchez M.D  [facility].

## 2023-04-14 DIAGNOSIS — E11.65 CONTROLLED TYPE 2 DIABETES MELLITUS WITH HYPERGLYCEMIA, WITHOUT LONG-TERM CURRENT USE OF INSULIN (HCC): ICD-10-CM

## 2023-04-14 DIAGNOSIS — E88.81 INSULIN RESISTANCE: ICD-10-CM

## 2023-04-14 DIAGNOSIS — R09.82 POST-NASAL DRIP: ICD-10-CM

## 2023-04-14 DIAGNOSIS — J32.1 CHRONIC FRONTAL SINUSITIS: ICD-10-CM

## 2023-04-14 DIAGNOSIS — R11.10 MORNING VOMITING: ICD-10-CM

## 2023-04-17 RX ORDER — LEVOCETIRIZINE DIHYDROCHLORIDE 5 MG/1
5 TABLET, FILM COATED ORAL NIGHTLY
Qty: 90 TABLET | Refills: 1 | Status: SHIPPED | OUTPATIENT
Start: 2023-04-17

## 2023-04-17 RX ORDER — FAMOTIDINE 40 MG/1
40 TABLET, FILM COATED ORAL NIGHTLY
Qty: 90 TABLET | Refills: 1 | Status: SHIPPED | OUTPATIENT
Start: 2023-04-17

## 2023-04-17 NOTE — TELEPHONE ENCOUNTER
Cori Castillo needs refill of   Requested Prescriptions     Pending Prescriptions Disp Refills    levocetirizine (XYZAL) 5 MG tablet 90 tablet 1     Sig: Take 1 tablet by mouth nightly    famotidine (PEPCID) 40 MG tablet 90 tablet 1     Sig: Take 1 tablet by mouth nightly       Last Filled on:      Last Visit Date:  02/13/2023    Next Visit Date:  Visit date not found

## 2023-06-02 ENCOUNTER — NURSE ONLY (OUTPATIENT)
Dept: LAB | Age: 48
End: 2023-06-02

## 2023-06-02 LAB
ALT SERPL W/O P-5'-P-CCNC: 19 U/L (ref 11–66)
AST SERPL-CCNC: 18 U/L (ref 5–40)
BASOPHILS ABSOLUTE: 0.1 THOU/MM3 (ref 0–0.1)
BASOPHILS NFR BLD AUTO: 0.6 %
CREAT SERPL-MCNC: 0.5 MG/DL (ref 0.4–1.2)
DEPRECATED RDW RBC AUTO: 46.5 FL (ref 35–45)
EOSINOPHIL NFR BLD AUTO: 2.1 %
EOSINOPHILS ABSOLUTE: 0.2 THOU/MM3 (ref 0–0.4)
ERYTHROCYTE [DISTWIDTH] IN BLOOD BY AUTOMATED COUNT: 14.9 % (ref 11.5–14.5)
ERYTHROCYTE [SEDIMENTATION RATE] IN BLOOD BY WESTERGREN METHOD: 31 MM/HR (ref 0–20)
GFR SERPL CREATININE-BSD FRML MDRD: > 60 ML/MIN/1.73M2
HCT VFR BLD AUTO: 40.1 % (ref 37–47)
HGB BLD-MCNC: 12.6 GM/DL (ref 12–16)
IMM GRANULOCYTES # BLD AUTO: 0.03 THOU/MM3 (ref 0–0.07)
IMM GRANULOCYTES NFR BLD AUTO: 0.3 %
LYMPHOCYTES ABSOLUTE: 3 THOU/MM3 (ref 1–4.8)
LYMPHOCYTES NFR BLD AUTO: 32.7 %
MCH RBC QN AUTO: 27.1 PG (ref 26–33)
MCHC RBC AUTO-ENTMCNC: 31.4 GM/DL (ref 32.2–35.5)
MCV RBC AUTO: 86.2 FL (ref 81–99)
MONOCYTES ABSOLUTE: 0.6 THOU/MM3 (ref 0.4–1.3)
MONOCYTES NFR BLD AUTO: 6.6 %
NEUTROPHILS NFR BLD AUTO: 57.7 %
NRBC BLD AUTO-RTO: 0 /100 WBC
PLATELET # BLD AUTO: 396 THOU/MM3 (ref 130–400)
PMV BLD AUTO: 10.2 FL (ref 9.4–12.4)
RBC # BLD AUTO: 4.65 MILL/MM3 (ref 4.2–5.4)
SEGMENTED NEUTROPHILS ABSOLUTE COUNT: 5.4 THOU/MM3 (ref 1.8–7.7)
WBC # BLD AUTO: 9.3 THOU/MM3 (ref 4.8–10.8)

## 2023-06-03 DIAGNOSIS — U07.1 COVID: ICD-10-CM

## 2023-06-05 RX ORDER — ALBUTEROL SULFATE 90 UG/1
AEROSOL, METERED RESPIRATORY (INHALATION)
Qty: 9 G | Refills: 5 | Status: SHIPPED | OUTPATIENT
Start: 2023-06-05

## 2023-06-05 NOTE — PATIENT INSTRUCTIONS
Patient Education        Asthma in Adults: Care Instructions  Overview     Asthma makes it hard for you to breathe. During an asthma attack, the airways swell and narrow. Severe asthma attacks can be dangerous, but you can usually prevent them. Controlling asthma and treating symptoms before they get bad canhelp you avoid bad attacks. You may also avoid future trips to the doctor. Follow-up care is a key part of your treatment and safety. Be sure to make and go to all appointments, and call your doctor if you are having problems. It's also a good idea to know your test results and keep alist of the medicines you take. How can you care for yourself at home?  Follow your asthma action plan so you can manage your symptoms at home. An asthma action plan will help you prevent and control airway reactions and will tell you what to do during an asthma attack. If you do not have an asthma action plan, work with your doctor to build one.  Take your asthma medicine exactly as prescribed. Medicine plays an important role in controlling asthma. Talk to your doctor right away if you have any questions about what to take and how to take it. ? Use your quick-relief medicine when you have symptoms of an asthma attack. Some people need to use quick-relief medicine before they exercise to prevent asthma symptoms. Albuterol is a quick-relief medicine that is often used. In some cases, a certain type of controller inhaler is used as a quick-relief medicine. Ask your doctor what to use for quick relief. ? Take your controller medicine. If you have symptoms often, you will likely need to take it every day. Controller medicine usually includes an inhaled corticosteroid. The goal is to prevent problems before they occur. ? If your doctor prescribed corticosteroid pills to use during an attack, take them as directed. They may take hours to work, but they may shorten the attack and help you breathe better. ?  Keep your quick-relief medicine with you at all times.  Talk to your doctor before using other medicines. Some medicines, such as aspirin, can cause asthma attacks in some people.  Check yourself for asthma symptoms to know which step to follow in your action plan. Watch for things like being short of breath, having chest tightness, coughing, and wheezing. Also notice if symptoms wake you up at night or if you get tired quickly when you exercise.  If you have a peak flow meter, use it to check how well you are breathing. This can help you predict when an asthma attack is going to occur. Then you can take medicine to prevent the asthma attack or make it less severe.  See your doctor regularly. These visits will help you learn more about asthma and what you can do to control it. Your doctor will monitor your treatment to make sure the medicine is helping you.  Keep track of your asthma attacks and your treatment. After you have had an attack, write down what triggered it, what helped end it, and any concerns you have about your asthma action plan. Take your diary when you see your doctor. You can then review your asthma action plan and decide if it is working.  Do not smoke or allow others to smoke around you. Avoid smoky places. Smoking makes asthma worse. If you need help quitting, talk to your doctor about stop-smoking programs and medicines. These can increase your chances of quitting for good.  Learn what triggers an asthma attack for you, and avoid the triggers when you can. Common triggers include colds, smoke, air pollution, dust, pollen, mold, pets, cockroaches, stress, and cold air.  Avoid colds and the flu. Talk to your doctor about getting a pneumococcal vaccine shot. If you have had one before, ask your doctor whether you need a second dose. Get a flu vaccine every fall. If you must be around people with colds or the flu, wash your hands often. When should you call for help?    Call 911 anytime you think you may need emergency care. For example, call if:     You have severe trouble breathing. Call your doctor now or seek immediate medical care if:     Your symptoms do not get better after you have followed your asthma action plan.      You cough up yellow, dark brown, or bloody mucus (sputum). Watch closely for changes in your health, and be sure to contact your doctor if:     Your coughing and wheezing get worse.      You need to use quick-relief medicine on more than 2 days a week within a month (unless it is just for exercise).      You need help figuring out what is triggering your asthma attacks. Where can you learn more? Go to https://IHS [email or web address].Chilltime. org and sign in to your [employer] account. Enter P597 in the [employer] box to learn more about \"Asthma in Adults: Care Instructions. \"     If you do not have an account, please click on the \"Sign Up Now\" link. Current as of: March 9, 2022               Content Version: 13.3  © 2006-2022 [employer]. Care instructions adapted under license by [facility] ([facility]). If you have questions about a medical condition or this instruction, always ask your healthcare professional. [place] [address] any warranty or liability for your use of this information. Patient Education        Learning About Asthma  What is asthma? Asthma is a lifelong condition that can make it hard to breathe. It causes theairways that lead to the lungs to swell and get inflamed. Some people have a hard time breathing only at certain times. This may be during allergy season, when they get a cold, or when they exercise. Others havebreathing problems a lot of the time. When asthma symptoms suddenly get worse (or flare up), the airways tighten and become narrower. This makes it hard to breathe, and you may wheeze or cough. These flare-ups are also called asthma attacks or exacerbations (say\"dx-WQC-xc-BAY-roel\").   Even though asthma is a lifelong condition, treatment can help you feel andbreathe better and help keep your lungs healthy. What are the symptoms of asthma? When you have asthma, you may:   Wheeze, making a loud or soft whistling noise when you breathe in and out.  Cough a lot. This is the only symptom for some people.  Feel tightness in your chest.   Feel short of breath. You may have rapid, shallow breathing or trouble breathing.  Have trouble sleeping because you're coughing or having a hard time breathing.  Get tired quickly during exercise. Symptoms may start soon after you're around things (triggers) that cause your asthma attacks. This is an early phase response. Or they may start several hours after exposure (late phase response). A late phase response can make itharder to figure out what triggers your symptoms. Symptoms can be mild or severe. You may have symptoms daily or just now andthen. Or you may have something in between. Some people have symptoms that get worse at night, such as a cough andshortness of breath. How is it treated? Asthma is treated with medicine to help you breathe easier, along withself-care. Medicines used to treat asthma include:  Controller medicines. These medicines prevent asthma attacks, stop problems before they happen, andreduce inflammation in your lungs. These things help you control your asthma. Quick-relief medicines. These medicines are used when you can't prevent symptoms and need to treat themfast.  Oral or injected corticosteroids (systemic corticosteroids). These medicines may be used to treat asthma attacks. Treatment also includes things you can do to control your symptoms, likeavoiding your triggers and following your asthma action plan. How can you prevent an asthma attack? There's no certain way to prevent asthma. But you can reduce your risk of asthma attacks by avoiding things that cause them.  And using your asthmacontroller medicine helps prevent them. The goal is to reduce how many attacks you have, how long they last, and howbad they get. Start by avoiding your asthma triggers. For example:   Don't smoke. And avoid being around others when they smoke.  Avoid things you're allergic to, like pet dander, dust mites, cockroaches, or pollen.  If exercise is a trigger, ask your doctor about using a quick-relief medicine before you exercise.  Stay inside when air pollution, pollen, or dust levels are high.  Try not to exercise outside when it's cold and dry. Also be sure to:   Ask your doctor about getting the flu and pneumococcal vaccines. Illnesses, like colds, flu, or pneumonia can make symptoms worse.  Avoid taking aspirin, ibuprofen, or similar medicines if they make symptoms worse. Follow-up care is a key part of your treatment and safety. Be sure to make and go to all appointments, and call your doctor if you are having problems. It's also a good idea to know your test results and keep alist of the medicines you take. Where can you learn more? Go to https://MostLikelypeBaynote.Siteminis. org and sign in to your [employer] account. Enter S983 in the rVue box to learn more about \"Learning About Asthma. \"     If you do not have an account, please click on the \"Sign Up Now\" link. Current as of: March 9, 2022               Content Version: 13.3  © 9617-0038 [employer]. Care instructions adapted under license by [facility] ([facility]). If you have questions about a medical condition or this instruction, always ask your healthcare professional. [place] [address] any warranty or liability for your use of this information. Yes

## 2023-06-05 NOTE — TELEPHONE ENCOUNTER
Wilfredo Sen needs refill of   Requested Prescriptions     Pending Prescriptions Disp Refills    albuterol sulfate HFA (PROVENTIL;VENTOLIN;PROAIR) 108 (90 Base) MCG/ACT inhaler [Pharmacy Med Name: Albuterol Sulfate  (90 Base) MCG/ACT Inhalation Aerosol Solution] 9 g 0     Sig: INHALE 2 PUFFS BY MOUTH 4 TIMES DAILY AS NEEDED FOR WHEEZING       Last Filled on:  02- #18 g R-3 and sent to [facility] in [place], [place] by Annette Anthony CNP.       Last Visit Date:  02/13/2023    Next Visit Date:  Visit date not found

## 2023-07-09 DIAGNOSIS — E88.81 INSULIN RESISTANCE: ICD-10-CM

## 2023-07-09 DIAGNOSIS — E11.65 CONTROLLED TYPE 2 DIABETES MELLITUS WITH HYPERGLYCEMIA, WITHOUT LONG-TERM CURRENT USE OF INSULIN (HCC): ICD-10-CM

## 2023-07-10 NOTE — TELEPHONE ENCOUNTER
Patient's last appointment was : 2/13/2023  Patient's next appointment is : Visit date not found  Last refilled:12/27/22, #180, 1 refill

## 2023-07-17 ENCOUNTER — OFFICE VISIT (OUTPATIENT)
Dept: PULMONOLOGY | Age: 48
End: 2023-07-17
Payer: COMMERCIAL

## 2023-07-17 VITALS
HEART RATE: 69 BPM | OXYGEN SATURATION: 97 % | TEMPERATURE: 98.1 F | DIASTOLIC BLOOD PRESSURE: 86 MMHG | BODY MASS INDEX: 44.41 KG/M2 | HEIGHT: 68 IN | SYSTOLIC BLOOD PRESSURE: 132 MMHG | WEIGHT: 293 LBS

## 2023-07-17 DIAGNOSIS — G47.33 OBSTRUCTIVE SLEEP APNEA ON CPAP: Primary | ICD-10-CM

## 2023-07-17 DIAGNOSIS — Z99.89 OBSTRUCTIVE SLEEP APNEA ON CPAP: Primary | ICD-10-CM

## 2023-07-17 DIAGNOSIS — E66.01 MORBID OBESITY WITH BMI OF 45.0-49.9, ADULT (HCC): ICD-10-CM

## 2023-07-17 PROCEDURE — 3079F DIAST BP 80-89 MM HG: CPT | Performed by: PHYSICIAN ASSISTANT

## 2023-07-17 PROCEDURE — G8417 CALC BMI ABV UP PARAM F/U: HCPCS | Performed by: PHYSICIAN ASSISTANT

## 2023-07-17 PROCEDURE — 1036F TOBACCO NON-USER: CPT | Performed by: PHYSICIAN ASSISTANT

## 2023-07-17 PROCEDURE — 3075F SYST BP GE 130 - 139MM HG: CPT | Performed by: PHYSICIAN ASSISTANT

## 2023-07-17 PROCEDURE — 99213 OFFICE O/P EST LOW 20 MIN: CPT | Performed by: PHYSICIAN ASSISTANT

## 2023-07-17 PROCEDURE — G8427 DOCREV CUR MEDS BY ELIG CLIN: HCPCS | Performed by: PHYSICIAN ASSISTANT

## 2023-07-17 RX ORDER — BUSPIRONE HYDROCHLORIDE 10 MG/1
TABLET ORAL
COMMUNITY
Start: 2023-07-12

## 2023-08-09 DIAGNOSIS — F32.89 OTHER DEPRESSION: ICD-10-CM

## 2023-08-09 RX ORDER — SERTRALINE HYDROCHLORIDE 100 MG/1
100 TABLET, FILM COATED ORAL DAILY
Qty: 90 TABLET | Refills: 3 | Status: SHIPPED | OUTPATIENT
Start: 2023-08-09

## 2023-08-09 NOTE — TELEPHONE ENCOUNTER
Juana Rock called requesting a refill on the following medications:  Requested Prescriptions     Pending Prescriptions Disp Refills    sertraline (ZOLOFT) 100 MG tablet 90 tablet 3     Sig: Take 1 tablet by mouth daily       Date of last visit: 2/13/2023  Date of next visit (if applicable):8/18/2023  Date of last refill: 6/29/22 for 1 year supply.   Pharmacy Name: Tavia Gerard MA

## 2023-08-11 RX ORDER — SODIUM CHLORIDE 0.9 % (FLUSH) 0.9 %
5-40 SYRINGE (ML) INJECTION PRN
Status: DISCONTINUED | OUTPATIENT
Start: 2023-08-11 | End: 2023-08-14 | Stop reason: HOSPADM

## 2023-08-11 RX ORDER — SODIUM CHLORIDE 9 MG/ML
INJECTION, SOLUTION INTRAVENOUS CONTINUOUS
Status: DISCONTINUED | OUTPATIENT
Start: 2023-08-11 | End: 2023-08-14 | Stop reason: HOSPADM

## 2023-08-11 RX ORDER — SODIUM CHLORIDE 9 MG/ML
25 INJECTION, SOLUTION INTRAVENOUS PRN
Status: DISCONTINUED | OUTPATIENT
Start: 2023-08-11 | End: 2023-08-14 | Stop reason: HOSPADM

## 2023-08-11 RX ORDER — SODIUM CHLORIDE 0.9 % (FLUSH) 0.9 %
5-40 SYRINGE (ML) INJECTION EVERY 12 HOURS SCHEDULED
Status: DISCONTINUED | OUTPATIENT
Start: 2023-08-11 | End: 2023-08-14 | Stop reason: HOSPADM

## 2023-08-13 ENCOUNTER — ANESTHESIA EVENT (OUTPATIENT)
Dept: ENDOSCOPY | Age: 48
End: 2023-08-13
Payer: COMMERCIAL

## 2023-08-14 ENCOUNTER — ANESTHESIA (OUTPATIENT)
Dept: ENDOSCOPY | Age: 48
End: 2023-08-14
Payer: COMMERCIAL

## 2023-08-14 ENCOUNTER — HOSPITAL ENCOUNTER (OUTPATIENT)
Age: 48
Setting detail: OUTPATIENT SURGERY
Discharge: HOME | End: 2023-08-14
Attending: INTERNAL MEDICINE | Admitting: INTERNAL MEDICINE
Payer: COMMERCIAL

## 2023-08-14 VITALS
TEMPERATURE: 97.3 F | BODY MASS INDEX: 45.99 KG/M2 | RESPIRATION RATE: 17 BRPM | HEART RATE: 74 BPM | HEIGHT: 67 IN | WEIGHT: 293 LBS | SYSTOLIC BLOOD PRESSURE: 128 MMHG | DIASTOLIC BLOOD PRESSURE: 60 MMHG | OXYGEN SATURATION: 97 %

## 2023-08-14 LAB — PREGNANCY, URINE: NEGATIVE

## 2023-08-14 PROCEDURE — 2709999900 HC NON-CHARGEABLE SUPPLY: Performed by: INTERNAL MEDICINE

## 2023-08-14 PROCEDURE — 88305 TISSUE EXAM BY PATHOLOGIST: CPT

## 2023-08-14 PROCEDURE — 2500000003 HC RX 250 WO HCPCS: Performed by: STUDENT IN AN ORGANIZED HEALTH CARE EDUCATION/TRAINING PROGRAM

## 2023-08-14 PROCEDURE — 2580000003 HC RX 258: Performed by: INTERNAL MEDICINE

## 2023-08-14 PROCEDURE — 2580000003 HC RX 258: Performed by: STUDENT IN AN ORGANIZED HEALTH CARE EDUCATION/TRAINING PROGRAM

## 2023-08-14 PROCEDURE — 3609010600 HC COLONOSCOPY POLYPECTOMY SNARE/COLD BIOPSY: Performed by: INTERNAL MEDICINE

## 2023-08-14 PROCEDURE — 81025 URINE PREGNANCY TEST: CPT

## 2023-08-14 PROCEDURE — 7100000010 HC PHASE II RECOVERY - FIRST 15 MIN: Performed by: INTERNAL MEDICINE

## 2023-08-14 PROCEDURE — 3700000001 HC ADD 15 MINUTES (ANESTHESIA): Performed by: INTERNAL MEDICINE

## 2023-08-14 PROCEDURE — 3700000000 HC ANESTHESIA ATTENDED CARE: Performed by: INTERNAL MEDICINE

## 2023-08-14 PROCEDURE — 7100000011 HC PHASE II RECOVERY - ADDTL 15 MIN: Performed by: INTERNAL MEDICINE

## 2023-08-14 PROCEDURE — 6360000002 HC RX W HCPCS: Performed by: STUDENT IN AN ORGANIZED HEALTH CARE EDUCATION/TRAINING PROGRAM

## 2023-08-14 RX ORDER — SODIUM CHLORIDE 0.9 % (FLUSH) 0.9 %
5-40 SYRINGE (ML) INJECTION EVERY 12 HOURS SCHEDULED
Status: CANCELLED | OUTPATIENT
Start: 2023-08-14

## 2023-08-14 RX ORDER — LIDOCAINE HYDROCHLORIDE 20 MG/ML
INJECTION, SOLUTION EPIDURAL; INFILTRATION; INTRACAUDAL; PERINEURAL PRN
Status: DISCONTINUED | OUTPATIENT
Start: 2023-08-14 | End: 2023-08-14 | Stop reason: SDUPTHER

## 2023-08-14 RX ORDER — SODIUM CHLORIDE 9 MG/ML
25 INJECTION, SOLUTION INTRAVENOUS PRN
Status: CANCELLED | OUTPATIENT
Start: 2023-08-14

## 2023-08-14 RX ORDER — SODIUM CHLORIDE 9 MG/ML
INJECTION, SOLUTION INTRAVENOUS CONTINUOUS PRN
Status: DISCONTINUED | OUTPATIENT
Start: 2023-08-14 | End: 2023-08-14 | Stop reason: SDUPTHER

## 2023-08-14 RX ORDER — APREMILAST 30 MG/1
TABLET, FILM COATED ORAL
COMMUNITY

## 2023-08-14 RX ORDER — SODIUM CHLORIDE 0.9 % (FLUSH) 0.9 %
5-40 SYRINGE (ML) INJECTION PRN
Status: CANCELLED | OUTPATIENT
Start: 2023-08-14

## 2023-08-14 RX ADMIN — PROPOFOL 100 MG: 10 INJECTION, EMULSION INTRAVENOUS at 11:19

## 2023-08-14 RX ADMIN — SODIUM CHLORIDE: 9 INJECTION, SOLUTION INTRAVENOUS at 10:58

## 2023-08-14 RX ADMIN — SODIUM CHLORIDE: 9 INJECTION, SOLUTION INTRAVENOUS at 09:56

## 2023-08-14 RX ADMIN — PROPOFOL 30 MG: 10 INJECTION, EMULSION INTRAVENOUS at 11:39

## 2023-08-14 RX ADMIN — PROPOFOL 60 MG: 10 INJECTION, EMULSION INTRAVENOUS at 11:42

## 2023-08-14 RX ADMIN — PROPOFOL 30 MG: 10 INJECTION, EMULSION INTRAVENOUS at 11:32

## 2023-08-14 RX ADMIN — PROPOFOL 50 MG: 10 INJECTION, EMULSION INTRAVENOUS at 11:25

## 2023-08-14 RX ADMIN — PROPOFOL 20 MG: 10 INJECTION, EMULSION INTRAVENOUS at 11:38

## 2023-08-14 RX ADMIN — PROPOFOL 50 MG: 10 INJECTION, EMULSION INTRAVENOUS at 11:29

## 2023-08-14 RX ADMIN — LIDOCAINE HYDROCHLORIDE 100 MG: 20 INJECTION, SOLUTION EPIDURAL; INFILTRATION; INTRACAUDAL; PERINEURAL at 11:19

## 2023-08-14 RX ADMIN — PROPOFOL 100 MG: 10 INJECTION, EMULSION INTRAVENOUS at 11:23

## 2023-08-14 ASSESSMENT — LIFESTYLE VARIABLES: SMOKING_STATUS: 0

## 2023-08-14 ASSESSMENT — ENCOUNTER SYMPTOMS: SHORTNESS OF BREATH: 1

## 2023-08-14 ASSESSMENT — PAIN - FUNCTIONAL ASSESSMENT: PAIN_FUNCTIONAL_ASSESSMENT: NONE - DENIES PAIN

## 2023-08-14 NOTE — PROGRESS NOTES
Recovery mode. Denies discomfort, passing gas, taking fluids.  discussed findings and plan of care with patient and . Discharge instructions provided, understanding verbalized.

## 2023-08-14 NOTE — BRIEF OP NOTE
Brief Postoperative Note      Patient: Solitario Perez  YOB: 1975  MRN: [id number]    Date of Procedure: 8/14/2023    Pre-Op Diagnosis Codes:     * Screening for colon cancer [Z12.11]    Post-Op Diagnosis: Same       Procedure(s):  COLONOSCOPY POLYPECTOMY SNARE/COLD BIOPSY    Surgeon(s):   Randolph Liz MD    Assistant:  * No surgical staff found *    Anesthesia: Monitor Anesthesia Care    Estimated Blood Loss (mL): Minimal    Complications: None    Specimens:   ID Type Source Tests Collected by Time Destination   A : bx hepatic flexure polyp Tissue Colon SURGICAL PATHOLOGY Randolph Liz MD 8/14/2023 1136    B : bx sigmoid polyp Tissue Colon SURGICAL PATHOLOGY Randolph Liz MD 8/14/2023 1144        Implants:  * No implants in log *      Drains: * No LDAs found *    Findings: above      Electronically signed by Randolph Liz MD on 8/14/2023 at 11:52 AM

## 2023-08-14 NOTE — PROGRESS NOTES
Pt admitted to endo pre-procedure room 5. Colonoscopy consent signed and verified. Urine ran for pregnancy - result negative.

## 2023-08-14 NOTE — PROGRESS NOTES
Colonoscopy completed, pictures taken. Biopsies taken, 2 jars labeled and sent to lab. Pt tolerated well. Scope  Used.

## 2023-08-14 NOTE — ANESTHESIA POSTPROCEDURE EVALUATION
Department of Anesthesiology  Postprocedure Note    Patient: Monika Mike  MRN: [id number]  YOB: 1975  Date of evaluation: 8/14/2023      Procedure Summary     Date: 08/14/23 Room / Location: [address] [facility] [address] / [address] [place] [address]    Anesthesia Start: 1824 Anesthesia Stop: 0192    Procedure: COLONOSCOPY POLYPECTOMY SNARE/COLD BIOPSY Diagnosis:       Screening for colon cancer      (Screening for colon cancer [Z12.11])    Surgeons: Nevin Sullivan MD Responsible Provider: Geri Ball DO    Anesthesia Type: MAC ASA Status: 3          Anesthesia Type: No value filed.     Rasta Phase I: Rasta Score: 10    Rasta Phase II:        Anesthesia Post Evaluation    Patient location during evaluation: PACU  Patient participation: complete - patient participated  Level of consciousness: awake and alert  Airway patency: patent  Nausea & Vomiting: no vomiting and no nausea  Complications: no  Cardiovascular status: hemodynamically stable  Respiratory status: acceptable and nasal cannula  Hydration status: stable  Pain management: adequate

## 2023-08-14 NOTE — ANESTHESIA PRE PROCEDURE
Department of Anesthesiology  Preprocedure Note       Name:  Nella Angel   Age:  52 y.o.  :  1975                                          MRN:  [id number]         Date:  2023      Surgeon: Marsha Contreras): Leeanne James MD    Procedure: Procedure(s):  COLONOSCOPY    Medications prior to admission:   Prior to Admission medications    Medication Sig Start Date End Date Taking?  Authorizing Provider   Apremilast (OTEZLA) 30 MG TABS Take by mouth   Yes Historical Provider, MD   sertraline (ZOLOFT) 100 MG tablet Take 1 tablet by mouth daily 23   THU Allen CNP   busPIRone (BUSPAR) 10 MG tablet  23   Historical Provider, MD   CPAP Machine MISC by Does not apply route Please change CPAP pressure to 14 cm H20. 23   Holger Rapp PA-C   metFORMIN (GLUCOPHAGE) 500 MG tablet TAKE 1 TABLET BY MOUTH TWICE DAILY WITH MEALS 7/10/23   THU Tobar CNP   albuterol sulfate HFA (PROVENTIL;VENTOLIN;PROAIR) 108 (90 Base) MCG/ACT inhaler INHALE 2 PUFFS BY MOUTH 4 TIMES DAILY AS NEEDED FOR WHEEZING 23   THU Allen CNP   levocetirizine (XYZAL) 5 MG tablet Take 1 tablet by mouth nightly 23   THU Allen CNP   famotidine (PEPCID) 40 MG tablet Take 1 tablet by mouth nightly 23   THU Allen CNP   lisinopril-hydroCHLOROthiazide (PRINZIDE;ZESTORETIC) 10-12.5 MG per tablet Take 1 tablet by mouth once daily 23   THU Allen CNP   promethazine-dextromethorphan (PROMETHAZINE-DM) 6.25-15 MG/5ML syrup Take 5 mLs by mouth 4 times daily as needed for Cough  Patient not taking: Reported on 3/15/2023 2/13/23   THU Allen CNP   SPIRIVA RESPIMAT 1.25 MCG/ACT AERS inhaler INHALE 2 SPRAY(S) BY MOUTH ONCE DAILY 22   Deleta Orquidea, APRN - CNP   ZINC PO Take 50 mg by mouth daily    Historical Provider, MD   ABATACEPT IV Infuse 1,000 mg intravenously every 28 days

## 2023-08-14 NOTE — H&P
[place] [address]                              HISTORY AND PHYSICAL    PATIENT NAME: Juana Rock                      :        1975  MED REC NO:   [id number]                           ROOM:  ACCOUNT NO:   [de-identified]                           ADMIT DATE: 2023  PROVIDER:     Montserrat Nicolas. Michael Zapata M.D.    [address] [place] [address] Drive:  The patient is here for screening colonoscopy. HISTORY OF PRESENT ILLNESS:  The patient is a 55-year-old female, not  had a colonoscopy before. BMI was 50. She is here for colonoscopy. PAST MEDICAL HISTORY:  As previously listed and unchanged. MEDICATIONS:  As previously listed and unchanged. ALLERGIES:  AS PREVIOUSLY LISTED AND UNCHANGED. SOCIAL HISTORY:  As previously listed and unchanged. FAMILY HISTORY:  As previously listed and unchanged. REVIEW OF SYSTEMS:  As previously listed and unchanged. PHYSICAL EXAMINATION:  GENERAL:  Awake, alert, and oriented x3. VITAL SIGNS:  The patient is afebrile. Heart rate 77, respiratory rate  18, blood pressure 155/86, and oxygen saturation 98%. HEENT:  Normocephalic and atraumatic. NECK:  Supple. No JVD. LUNGS:  Clear. HEART:  Regular rate. ABDOMEN:  Soft and nontender. RECTAL:  Will be done with colonoscopy. EXTREMITIES:  Without edema. NEUROLOGIC:  Awake, alert, oriented x3. IMPRESSION:  The patient has not had a colonoscopy. She is here today  for screening. PLAN:  Screening colonoscopy. PETROS Zapata M.D.    D: 2023 11:17:58       T: 2023 11:53:20     HS/[email or web address]  Job#: [id number]     Doc#: [id number]    CC:

## 2023-08-14 NOTE — OP NOTE
Operative Note      Patient: French Chatman  YOB: 1975  MRN: [id number]    Date of Procedure: 8/14/2023    Pre-Op Diagnosis Codes:     * Screening for colon cancer [Z12.11]    Post-Op Diagnosis: Same       Procedure(s):  COLONOSCOPY POLYPECTOMY SNARE/COLD BIOPSY    Surgeon(s):   Renetta Haider MD    Assistant:   * No surgical staff found *    Anesthesia: Monitor Anesthesia Care    Estimated Blood Loss (mL): Minimal    Complications: None    Specimens:   ID Type Source Tests Collected by Time Destination   A : bx hepatic flexure polyp Tissue Colon SURGICAL PATHOLOGY Renetta Haider MD 8/14/2023 1136    B : bx sigmoid polyp Tissue Colon SURGICAL PATHOLOGY Renetta Haider MD 8/14/2023 1144        Implants:  * No implants in log *      Drains: * No LDAs found *    Findings: above        Detailed Description of Procedure:   dictated    Electronically signed by Renetta Haider MD on 8/14/2023 at 11:53 AM

## 2023-08-15 NOTE — OP NOTE
[place] [address]                                OPERATIVE REPORT    PATIENT NAME: Lainey Zelaya                      :        1975  MED REC NO:   [id number]                           ROOM:  ACCOUNT NO:   [de-identified]                           ADMIT DATE: 2023  PROVIDER:     Monserrat Shipley M.D.    Winter Drown:  2023    SURGEON:  Manuel Luu MD    PROCEDURE:  Colonoscopy. INDICATIONS:  Screening. ANESTHESIA:  IV Diprivan. DESCRIPTION OF PROCEDURE:  Prior to procedure, risks, benefits,  complications (including but not limited to the following - bleeding,  infection, perforation, emergency surgery, stroke, heart attack, death,  possible anesthetic risks, and the fact that the procedure is not 100%  accurate or successful), indications, and alternatives of colonoscopy  were explained to the patient. The patient understood and agreed to the  procedure. All questions were answered. With the patient in the left lateral decubitus position, digital rectal  exam was done, which was normal.  Colonoscope was introduced into the  rectum. Mucosa appeared normal.  Colonoscope was advanced into the  rectosigmoid colon, descending colon, transverse colon, ascending colon,  and cecum. The cecum was confirmed by visualization of the ileocecal  valve, visualization of the appendiceal orifice, and visualization of  the entire terminal ileum. Colonoscope was carefully traced back to the colon. Colon was  decompressed along the way. Colonic wall was carefully inspected along  the way. Colonoscope was traced back to the ascending colon, hepatic  flexure. A 3 mm flat polyp removed in piecemeal fashion with cold  biopsy. Colonoscope was reintroduced through the ascending colon and  cecum for a \"second look. \"  Suzya Steve was traced back to ascending  colon, transverse colon, descending colon, and

## 2023-08-15 NOTE — OP NOTE
[place] [address]                                OPERATIVE REPORT    PATIENT NAME: Nessa Rogers                      :        1975  MED REC NO:   [id number]                           ROOM:  ACCOUNT NO:   [de-identified]                           ADMIT DATE: 2023  PROVIDER:     Bernardine Lennox. JESSENIA Zamora Dasen:  2023    ADDENDUM    Note that there were left-sided diverticula noted. Estimated blood loss  was less than 10 mL. PETROS Bowens M.D.    D: 2023 12:06:05       T: 2023 19:43:23     /[email or web address]  Job#: [id number]     Doc#: [id number]    CC:

## 2023-08-18 ENCOUNTER — OFFICE VISIT (OUTPATIENT)
Dept: FAMILY MEDICINE CLINIC | Age: 48
End: 2023-08-18
Payer: COMMERCIAL

## 2023-08-18 VITALS
HEIGHT: 68 IN | DIASTOLIC BLOOD PRESSURE: 82 MMHG | TEMPERATURE: 98.2 F | BODY MASS INDEX: 44.41 KG/M2 | HEART RATE: 90 BPM | OXYGEN SATURATION: 99 % | WEIGHT: 293 LBS | SYSTOLIC BLOOD PRESSURE: 122 MMHG

## 2023-08-18 DIAGNOSIS — I10 HYPERTENSION, UNSPECIFIED TYPE: ICD-10-CM

## 2023-08-18 DIAGNOSIS — R09.82 POST-NASAL DRIP: ICD-10-CM

## 2023-08-18 DIAGNOSIS — F32.89 OTHER DEPRESSION: ICD-10-CM

## 2023-08-18 DIAGNOSIS — E88.81 INSULIN RESISTANCE: ICD-10-CM

## 2023-08-18 DIAGNOSIS — Z00.01 ENCOUNTER FOR WELL ADULT EXAM WITH ABNORMAL FINDINGS: Primary | ICD-10-CM

## 2023-08-18 DIAGNOSIS — J32.1 CHRONIC FRONTAL SINUSITIS: ICD-10-CM

## 2023-08-18 DIAGNOSIS — I10 PRIMARY HYPERTENSION: ICD-10-CM

## 2023-08-18 DIAGNOSIS — E11.65 CONTROLLED TYPE 2 DIABETES MELLITUS WITH HYPERGLYCEMIA, WITHOUT LONG-TERM CURRENT USE OF INSULIN (HCC): ICD-10-CM

## 2023-08-18 PROBLEM — R11.10 MORNING VOMITING: Status: ACTIVE | Noted: 2023-08-18

## 2023-08-18 PROCEDURE — 3074F SYST BP LT 130 MM HG: CPT | Performed by: NURSE PRACTITIONER

## 2023-08-18 PROCEDURE — 3079F DIAST BP 80-89 MM HG: CPT | Performed by: NURSE PRACTITIONER

## 2023-08-18 PROCEDURE — 99396 PREV VISIT EST AGE 40-64: CPT | Performed by: NURSE PRACTITIONER

## 2023-08-18 RX ORDER — LISINOPRIL AND HYDROCHLOROTHIAZIDE 12.5; 1 MG/1; MG/1
1 TABLET ORAL DAILY
Qty: 90 TABLET | Refills: 1 | Status: SHIPPED | OUTPATIENT
Start: 2023-08-18

## 2023-08-18 RX ORDER — MAGNESIUM OXIDE/MAG AA CHELATE 300 MG
600 CAPSULE ORAL DAILY
COMMUNITY

## 2023-08-18 RX ORDER — LEVOCETIRIZINE DIHYDROCHLORIDE 5 MG/1
5 TABLET, FILM COATED ORAL NIGHTLY
Qty: 90 TABLET | Refills: 1 | Status: SHIPPED | OUTPATIENT
Start: 2023-08-18

## 2023-08-18 RX ORDER — METHOTREXATE 25 MG/ML
INJECTION, SOLUTION INTRA-ARTERIAL; INTRAMUSCULAR; INTRAVENOUS
COMMUNITY
Start: 2023-08-15

## 2023-08-18 RX ORDER — TIOTROPIUM BROMIDE INHALATION SPRAY 1.56 UG/1
SPRAY, METERED RESPIRATORY (INHALATION)
Qty: 4 G | Refills: 5 | Status: SHIPPED | OUTPATIENT
Start: 2023-08-18

## 2023-08-18 ASSESSMENT — ENCOUNTER SYMPTOMS
SHORTNESS OF BREATH: 1
ABDOMINAL PAIN: 0
COUGH: 0
EYE REDNESS: 0
ABDOMINAL DISTENTION: 0
CONSTIPATION: 0
WHEEZING: 0
DIARRHEA: 0
EYE PAIN: 0
CHEST TIGHTNESS: 0

## 2023-08-18 ASSESSMENT — PATIENT HEALTH QUESTIONNAIRE - PHQ9
SUM OF ALL RESPONSES TO PHQ QUESTIONS 1-9: 3
9. THOUGHTS THAT YOU WOULD BE BETTER OFF DEAD, OR OF HURTING YOURSELF: 0
6. FEELING BAD ABOUT YOURSELF - OR THAT YOU ARE A FAILURE OR HAVE LET YOURSELF OR YOUR FAMILY DOWN: 0
SUM OF ALL RESPONSES TO PHQ QUESTIONS 1-9: 3
5. POOR APPETITE OR OVEREATING: 1
2. FEELING DOWN, DEPRESSED OR HOPELESS: 0
SUM OF ALL RESPONSES TO PHQ QUESTIONS 1-9: 3
SUM OF ALL RESPONSES TO PHQ QUESTIONS 1-9: 3
SUM OF ALL RESPONSES TO PHQ9 QUESTIONS 1 & 2: 1
3. TROUBLE FALLING OR STAYING ASLEEP: 0
7. TROUBLE CONCENTRATING ON THINGS, SUCH AS READING THE NEWSPAPER OR WATCHING TELEVISION: 1
4. FEELING TIRED OR HAVING LITTLE ENERGY: 0
10. IF YOU CHECKED OFF ANY PROBLEMS, HOW DIFFICULT HAVE THESE PROBLEMS MADE IT FOR YOU TO DO YOUR WORK, TAKE CARE OF THINGS AT HOME, OR GET ALONG WITH OTHER PEOPLE: 1
8. MOVING OR SPEAKING SO SLOWLY THAT OTHER PEOPLE COULD HAVE NOTICED. OR THE OPPOSITE, BEING SO FIGETY OR RESTLESS THAT YOU HAVE BEEN MOVING AROUND A LOT MORE THAN USUAL: 0
1. LITTLE INTEREST OR PLEASURE IN DOING THINGS: 1

## 2023-08-18 NOTE — PROGRESS NOTES
normal.      Mouth/Throat:      Mouth: Mucous membranes are moist.      Pharynx: No oropharyngeal exudate. Eyes:      General:         Right eye: No discharge. Left eye: No discharge. Conjunctiva/sclera: Conjunctivae normal.   Neck:      Thyroid: No thyromegaly. Cardiovascular:      Rate and Rhythm: Normal rate and regular rhythm. Pulses: Normal pulses. Heart sounds: Normal heart sounds. Pulmonary:      Effort: Pulmonary effort is normal. No respiratory distress. Breath sounds: Normal breath sounds. No wheezing or rales. Chest:      Chest wall: No tenderness. Abdominal:      General: Bowel sounds are normal. There is no distension. Palpations: Abdomen is soft. Tenderness: There is no abdominal tenderness. Musculoskeletal:         General: No tenderness. Normal range of motion. Cervical back: Normal range of motion and neck supple. No tenderness. No muscular tenderness. Lymphadenopathy:      Cervical: No cervical adenopathy. Skin:     General: Skin is warm and dry. Capillary Refill: Capillary refill takes less than 2 seconds. Coloration: Skin is not pale. Findings: No erythema or rash. Neurological:      General: No focal deficit present. Mental Status: She is alert and oriented to person, place, and time. Mental status is at baseline. Cranial Nerves: No cranial nerve deficit. Motor: No abnormal muscle tone. Coordination: Coordination normal.      Deep Tendon Reflexes: Reflexes are normal and symmetric. Reflexes normal.   Psychiatric:         Mood and Affect: Mood normal.         Behavior: Behavior normal.         Thought Content: Thought content normal.         Judgment: Judgment normal.               An electronic signature was used to authenticate this note.     --THU Johnson - CNP

## 2023-08-19 ENCOUNTER — PATIENT MESSAGE (OUTPATIENT)
Dept: FAMILY MEDICINE CLINIC | Age: 48
End: 2023-08-19

## 2023-08-21 NOTE — TELEPHONE ENCOUNTER
From: Carol Salinas  To: Elvis Mcmahan  Sent: 8/19/2023 9:17 PM EDT  Subject: Thyroid check? Gertrudis Fonseca! I hope you had a good weekend! I forgot to ask how to get my thyroid checked when I was in on Friday, 8.18.23. Is that something that is already included in the bloodwork request you sent me to get in the next couple days? If not, is there a way to check it? Just curious. Thank you for your time!   Take care,  Mulugeta

## 2023-08-25 ENCOUNTER — NURSE ONLY (OUTPATIENT)
Dept: LAB | Age: 48
End: 2023-08-25

## 2023-08-25 DIAGNOSIS — E11.65 CONTROLLED TYPE 2 DIABETES MELLITUS WITH HYPERGLYCEMIA, WITHOUT LONG-TERM CURRENT USE OF INSULIN (HCC): ICD-10-CM

## 2023-08-25 DIAGNOSIS — E88.81 INSULIN RESISTANCE: ICD-10-CM

## 2023-08-25 DIAGNOSIS — I10 PRIMARY HYPERTENSION: ICD-10-CM

## 2023-08-25 LAB
CHOLEST SERPL-MCNC: 163 MG/DL (ref 100–199)
DEPRECATED MEAN GLUCOSE BLD GHB EST-ACNC: 147 MG/DL (ref 70–126)
HBA1C MFR BLD HPLC: 6.9 % (ref 4.4–6.4)
HDLC SERPL-MCNC: 31 MG/DL
LDLC SERPL CALC-MCNC: 94 MG/DL
TRIGL SERPL-MCNC: 190 MG/DL (ref 0–199)
TSH SERPL DL<=0.005 MIU/L-ACNC: 1.6 UIU/ML (ref 0.4–4.2)

## 2023-08-28 ENCOUNTER — TELEPHONE (OUTPATIENT)
Dept: FAMILY MEDICINE CLINIC | Age: 48
End: 2023-08-28

## 2023-08-28 NOTE — TELEPHONE ENCOUNTER
----- Message from THU Odonnell CNP sent at 8/28/2023  9:45 AM EDT -----    Blood sugar elevated. Would have pt start Saint Xochitl and Keira. Would she be interested in trialing ozempic or trulicity weekly injections to see if we can get blood sugar down?

## 2023-08-28 NOTE — PROGRESS NOTES
[facility]  Dept: [id number] (:  1975) is a 50 y.o. female,{New vs Established:[id number]::\"Established patient\"}, here for evaluation of the following chief complaint(s):  No chief complaint on file. ASSESSMENT/PLAN:  {There are no diagnoses linked to this encounter. (Refresh or delete this SmartLink)}    No follow-ups on file. SUBJECTIVE/OBJECTIVE:  HPI    Review of Systems    Physical Exam      {Time Documentation Optional:[id number]}      An electronic signature was used to authenticate this note.     --Minus THU Mcduffie - CNP

## 2023-09-15 ENCOUNTER — OFFICE VISIT (OUTPATIENT)
Dept: CARDIOLOGY CLINIC | Age: 48
End: 2023-09-15
Payer: COMMERCIAL

## 2023-09-15 VITALS — HEART RATE: 67 BPM | WEIGHT: 293 LBS | HEIGHT: 68 IN | BODY MASS INDEX: 44.41 KG/M2

## 2023-09-15 DIAGNOSIS — R06.02 SOB (SHORTNESS OF BREATH): Primary | ICD-10-CM

## 2023-09-15 DIAGNOSIS — R00.2 HEART PALPITATIONS: ICD-10-CM

## 2023-09-15 PROCEDURE — G8427 DOCREV CUR MEDS BY ELIG CLIN: HCPCS | Performed by: INTERNAL MEDICINE

## 2023-09-15 PROCEDURE — 1036F TOBACCO NON-USER: CPT | Performed by: INTERNAL MEDICINE

## 2023-09-15 PROCEDURE — 93000 ELECTROCARDIOGRAM COMPLETE: CPT | Performed by: INTERNAL MEDICINE

## 2023-09-15 PROCEDURE — G8417 CALC BMI ABV UP PARAM F/U: HCPCS | Performed by: INTERNAL MEDICINE

## 2023-09-15 PROCEDURE — 99214 OFFICE O/P EST MOD 30 MIN: CPT | Performed by: INTERNAL MEDICINE

## 2023-09-15 NOTE — PROGRESS NOTES
[address] [facility]  Dept: [phone]  Dept Fax: [phone]  Loc: [phone]    Visit Date: 9/15/2023    Ms. Jewels Nova is a 50 y.o. female  who presented for:  Chief Complaint   Patient presents with    Check-Up    Hypertension       HPI:   54 y/o female with Psoriatic arthrtis, T2 DM, Hypertension, VICKY on CPAP, PCOS and Obesity was referred for pulmonary HTN. Patient had PFTs and bronchial challenge and apparently showed no abnormalities. She was referred for possible Pulmonary HTN. BMI is 48. Denies chest pain, palpitations lightheadedness, dizziness, orthopnea. Echo from 8/2021 showed normal LVSF, RVSF, normal RV size. She underwent echo and proBNP both of which were normal.    She is here for a follow up.           Current Outpatient Medications:     Magnesium 300 MG CAPS, Take 600 mg by mouth daily, Disp: , Rfl:     methotrexate Sodium 50 MG/2ML chemo injection, once a week, Disp: , Rfl:     levocetirizine (XYZAL) 5 MG tablet, Take 1 tablet by mouth nightly, Disp: 90 tablet, Rfl: 1    lisinopril-hydroCHLOROthiazide (PRINZIDE;ZESTORETIC) 10-12.5 MG per tablet, Take 1 tablet by mouth daily, Disp: 90 tablet, Rfl: 1    metFORMIN (GLUCOPHAGE) 500 MG tablet, Take 1 tablet by mouth with breakfast and with evening meal, Disp: 180 tablet, Rfl: 1    tiotropium (SPIRIVA RESPIMAT) 1.25 MCG/ACT AERS inhaler, INHALE 2 SPRAY(S) BY MOUTH ONCE DAILY, Disp: 4 g, Rfl: 5    Apremilast (OTEZLA) 30 MG TABS, Take by mouth, Disp: , Rfl:     sertraline (ZOLOFT) 100 MG tablet, Take 1 tablet by mouth daily, Disp: 90 tablet, Rfl: 3    busPIRone (BUSPAR) 10 MG tablet, Take 1 tablet by mouth 2 times daily, Disp: , Rfl:     albuterol sulfate HFA (PROVENTIL;VENTOLIN;PROAIR) 108 (90 Base) MCG/ACT inhaler, INHALE 2 PUFFS BY MOUTH 4 TIMES DAILY AS NEEDED FOR WHEEZING, Disp: 9 g, Rfl: 5    famotidine (PEPCID) 40 MG tablet, Take 1

## 2023-10-03 ENCOUNTER — PATIENT MESSAGE (OUTPATIENT)
Dept: FAMILY MEDICINE CLINIC | Age: 48
End: 2023-10-03

## 2023-10-03 RX ORDER — ONDANSETRON 4 MG/1
4 TABLET, FILM COATED ORAL DAILY PRN
Qty: 30 TABLET | Refills: 0 | Status: SHIPPED | OUTPATIENT
Start: 2023-10-03

## 2023-10-14 DIAGNOSIS — E11.65 CONTROLLED TYPE 2 DIABETES MELLITUS WITH HYPERGLYCEMIA, WITHOUT LONG-TERM CURRENT USE OF INSULIN (HCC): ICD-10-CM

## 2023-10-14 DIAGNOSIS — R11.10 MORNING VOMITING: ICD-10-CM

## 2023-10-14 DIAGNOSIS — E88.819 INSULIN RESISTANCE: ICD-10-CM

## 2023-10-16 RX ORDER — FAMOTIDINE 40 MG/1
40 TABLET, FILM COATED ORAL NIGHTLY
Qty: 90 TABLET | Refills: 1 | Status: SHIPPED | OUTPATIENT
Start: 2023-10-16

## 2023-10-16 NOTE — TELEPHONE ENCOUNTER
Solitario Perez needs refill of   Requested Prescriptions     Pending Prescriptions Disp Refills    famotidine (PEPCID) 40 MG tablet 90 tablet 1     Sig: Take 1 tablet by mouth nightly       Last Filled on:  04- #90 R-1 and sent to [facility] COLUNGA in [place], [place] by Yun Brooks CNP.       Last Visit Date:  08/18/2023    Next Visit Date:  Visit date not found

## 2023-10-29 DIAGNOSIS — U07.1 COVID: ICD-10-CM

## 2023-10-30 RX ORDER — ALBUTEROL SULFATE 90 UG/1
AEROSOL, METERED RESPIRATORY (INHALATION)
Qty: 9 G | Refills: 3 | Status: SHIPPED | OUTPATIENT
Start: 2023-10-30

## 2023-10-30 NOTE — TELEPHONE ENCOUNTER
Nella Angel needs refill of   Requested Prescriptions     Pending Prescriptions Disp Refills    albuterol sulfate HFA (PROVENTIL;VENTOLIN;PROAIR) 108 (90 Base) MCG/ACT inhaler [Pharmacy Med Name: Albuterol Sulfate  (90 Base) MCG/ACT Inhalation Aerosol Solution] 9 g 0     Sig: INHALE 2 PUFFS BY MOUTH 4 TIMES DAILY AS NEEDED FOR WHEEZING       Last Filled on:  06- #9 g R-5 and sent to [facility] in [place], [place] by Konrad Heard CNP.       Last Visit Date:  08/18/2023    Next Visit Date:  Visit date not found

## 2024-02-07 ENCOUNTER — OFFICE VISIT (OUTPATIENT)
Dept: FAMILY MEDICINE CLINIC | Age: 49
End: 2024-02-07
Payer: COMMERCIAL

## 2024-02-07 VITALS
OXYGEN SATURATION: 99 % | SYSTOLIC BLOOD PRESSURE: 126 MMHG | BODY MASS INDEX: 48.32 KG/M2 | DIASTOLIC BLOOD PRESSURE: 82 MMHG | HEART RATE: 81 BPM | TEMPERATURE: 97.7 F | WEIGHT: 293 LBS

## 2024-02-07 DIAGNOSIS — D84.9 IMMUNOSUPPRESSION (HCC): ICD-10-CM

## 2024-02-07 DIAGNOSIS — J11.1 INFLUENZA-LIKE ILLNESS: Primary | ICD-10-CM

## 2024-02-07 PROBLEM — R11.10 MORNING VOMITING: Status: RESOLVED | Noted: 2023-08-18 | Resolved: 2024-02-07

## 2024-02-07 LAB
INFLUENZA VIRUS A RNA: NEGATIVE
INFLUENZA VIRUS B RNA: NEGATIVE

## 2024-02-07 PROCEDURE — 3079F DIAST BP 80-89 MM HG: CPT | Performed by: NURSE PRACTITIONER

## 2024-02-07 PROCEDURE — G8484 FLU IMMUNIZE NO ADMIN: HCPCS | Performed by: NURSE PRACTITIONER

## 2024-02-07 PROCEDURE — G8427 DOCREV CUR MEDS BY ELIG CLIN: HCPCS | Performed by: NURSE PRACTITIONER

## 2024-02-07 PROCEDURE — 3074F SYST BP LT 130 MM HG: CPT | Performed by: NURSE PRACTITIONER

## 2024-02-07 PROCEDURE — G8417 CALC BMI ABV UP PARAM F/U: HCPCS | Performed by: NURSE PRACTITIONER

## 2024-02-07 PROCEDURE — 1036F TOBACCO NON-USER: CPT | Performed by: NURSE PRACTITIONER

## 2024-02-07 PROCEDURE — 99214 OFFICE O/P EST MOD 30 MIN: CPT | Performed by: NURSE PRACTITIONER

## 2024-02-07 PROCEDURE — 87502 INFLUENZA DNA AMP PROBE: CPT | Performed by: NURSE PRACTITIONER

## 2024-02-07 RX ORDER — BENZONATATE 200 MG/1
200 CAPSULE ORAL 3 TIMES DAILY PRN
Qty: 30 CAPSULE | Refills: 0 | Status: SHIPPED | OUTPATIENT
Start: 2024-02-07 | End: 2024-02-17

## 2024-02-07 RX ORDER — OSELTAMIVIR PHOSPHATE 75 MG/1
75 CAPSULE ORAL 2 TIMES DAILY
Qty: 10 CAPSULE | Refills: 0 | Status: SHIPPED | OUTPATIENT
Start: 2024-02-07 | End: 2024-02-12

## 2024-02-07 RX ORDER — PREDNISONE 20 MG/1
20 TABLET ORAL 2 TIMES DAILY
Qty: 10 TABLET | Refills: 0 | Status: SHIPPED | OUTPATIENT
Start: 2024-02-07 | End: 2024-02-12

## 2024-02-07 ASSESSMENT — ENCOUNTER SYMPTOMS
WHEEZING: 0
CHEST TIGHTNESS: 0
EYE REDNESS: 0
SHORTNESS OF BREATH: 0
RHINORRHEA: 1
EYE PAIN: 0
SORE THROAT: 1
TROUBLE SWALLOWING: 0
SINUS PAIN: 1
GASTROINTESTINAL NEGATIVE: 1
EYE ITCHING: 0
VOICE CHANGE: 0
COUGH: 1

## 2024-02-07 NOTE — PATIENT INSTRUCTIONS
Suggestions for symptom management that are available over the counter.   If you have questions regarding allergies or contraindications to use, please speak to the pharmacy staff or your family provider.    For fevers or pain: acetaminophen (Tylenol), ibuprofen (Motrin)  For dry cough: medications containing dextromethorphan, such as Delsym, Robitussin DM or Mucinex DM and medicated throat lozenges  For congestion or sinus pressure: decongestant nasal sprays, such as Afrin (for up to 3 days), medications containing guaifenesin to help break up mucus, such as Mucinex or Robitussin, nasal steroid sprays, such as Flonase, Sensimist, Rhinocort or Nasonex and saline nasal sprays, neti pot or sinus rinse bottle  For runny nose, sneezing or watery/itchy eyes: less sedating antihistamines, such as loratidine (Claritin), fexofenadine (Allegra) or Cetirizine (Zyrtec) and antihistamine eye drops, such as ketotifen (Zaditor, Alaway) or olopatadine (Pataday)  For sore throat:  Chloraseptic throat spray or sore throat lozenges or  Mucinex Instasoothe Sore Throat & Pain Cherry Spray  If you have high blood pressure, a brand like Coricidin HBP may be an option.you should avoid medications containing pseudoephedrine or phenylephrine, such as Sudafed,  running a humidifier in your bedroom may be helpful for many of your symptoms.  If your cough is keeping you awake at night, you can try raising your head with an extra pillow.  If the skin around your nose and lips becomes sore, you can put some petroleum jelly on the area.      Suggestions for over-the-counter supplements to help your immune system, if you have questions regarding allergies or contraindications to use, please speak to the pharmacy staff or your family provider.    Multi-vitamin/multi-mineral daily   Vitamin D3 3000 IU daily  Zinc 30 mg daily  Vitamin C 1000 mg twice daily  B-100 complex as daily as directed on bottle  Probiotic/Prebiotic cespedes  Gargle with

## 2024-02-07 NOTE — PROGRESS NOTES
[address] [place] [address]  Dept: [phone]          Isamar Smith (:  1975) is a 48 y.o. female,Established patient, here for evaluation of the following chief complaint(s):  Cough (tired, low fever in the 99's, chills started yesterday. Taking Tylenol for her symptoms.)      ASSESSMENT/PLAN:  I have reviewed the patient's medical history in detail and updated the computerized patient record.  HPI/ROS per the patient and caregiver.   Overall non toxic in appearance. Answers questions appropriately.   Conditions discussed and addressed this visit include:   Here for acute URI x 24 hours.   Appears consistent with influenza  Will cover with tamiflu  Continue to push fluids  Continue home measures  1. Influenza-like illness  -     POCT Influenza A/B DNA (Alere i)  -     oseltamivir (TAMIFLU) 75 MG capsule; Take 1 capsule by mouth 2 times daily for 5 days, Disp-10 capsule, R-0Normal  -     predniSONE (DELTASONE) 20 MG tablet; Take 1 tablet by mouth 2 times daily for 5 days, Disp-10 tablet, R-0Normal  -     benzonatate (TESSALON) 200 MG capsule; Take 1 capsule by mouth 3 times daily as needed for Cough, Disp-30 capsule, R-0Normal  2. Immunosuppression (HCC)  -     POCT Influenza A/B DNA (Alere i)  -     oseltamivir (TAMIFLU) 75 MG capsule; Take 1 capsule by mouth 2 times daily for 5 days, Disp-10 capsule, R-0Normal  -     predniSONE (DELTASONE) 20 MG tablet; Take 1 tablet by mouth 2 times daily for 5 days, Disp-10 tablet, R-0Normal  -     benzonatate (TESSALON) 200 MG capsule; Take 1 capsule by mouth 3 times daily as needed for Cough, Disp-30 capsule, R-0Normal      Return if symptoms worsen or fail to improve, for Medication refill, Routine follow up, Results review.    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

## 2024-02-13 ENCOUNTER — PATIENT MESSAGE (OUTPATIENT)
Dept: FAMILY MEDICINE CLINIC | Age: 49
End: 2024-02-13

## 2024-02-13 RX ORDER — PREDNISONE 20 MG/1
20 TABLET ORAL 2 TIMES DAILY
Qty: 10 TABLET | Refills: 0 | Status: SHIPPED | OUTPATIENT
Start: 2024-02-13 | End: 2024-02-18

## 2024-02-13 RX ORDER — AZITHROMYCIN 250 MG/1
250 TABLET, FILM COATED ORAL SEE ADMIN INSTRUCTIONS
Qty: 6 TABLET | Refills: 0 | Status: SHIPPED | OUTPATIENT
Start: 2024-02-13 | End: 2024-02-18

## 2024-02-13 RX ORDER — FLUCONAZOLE 100 MG/1
100 TABLET ORAL DAILY
Qty: 7 TABLET | Refills: 0 | Status: SHIPPED | OUTPATIENT
Start: 2024-02-13 | End: 2024-02-20

## 2024-02-13 NOTE — TELEPHONE ENCOUNTER
From: Isamar Smith  To: Gina Howell  Sent: 2/13/2024 8:49 AM EST  Subject: Wrap around…feeling ROUGH    Hi Gina,  I’m sorry I didn’t message Friday. I went back to work feeling pretty good overall. Made it through the day no problem. Saturday no problems other than only 4 hrs sleep. Sunday…was so tired…slept about 12 hours. Monday went to work did okay overall some coughing. Monday about 5pm…chills, shivering, fever of about 101 with ear thermometer. Very hard time breathing even with albuteral inhaler. Went to bed about 7pm and was up and down a few times. Fever broke around 2-3am.     At work now for half day bc off this afternoon for a virtual appointment with Ambrosio. Raspy breathing, tired. I hadn’t taken prednisone in the afternoon evening on Friday through Sunday so I took two yesterday and one so far today. Just wondering your thoughts on the breathing thing…    Ambrosio’s virtual appointment is at 1:30-2:30 but I could come in either before or after it. I may be able to get sub coverage before noon if needed.     Take care,  Isamar

## 2024-02-13 NOTE — TELEPHONE ENCOUNTER
From: Isamar Smith  To: Gina Howell  Sent: 2/13/2024 1:06 PM EST  Subject: Diflucan?     Joaquin Fuentes,  Thank you for sending in the other prescriptions to help me! Would it be possible to add a few diflucan please?   Thank you for considering!  Isamar

## 2024-02-14 DIAGNOSIS — I10 HYPERTENSION, UNSPECIFIED TYPE: ICD-10-CM

## 2024-02-14 RX ORDER — LISINOPRIL AND HYDROCHLOROTHIAZIDE 12.5; 1 MG/1; MG/1
1 TABLET ORAL DAILY
Qty: 90 TABLET | Refills: 1 | Status: SHIPPED | OUTPATIENT
Start: 2024-02-14

## 2024-02-14 NOTE — TELEPHONE ENCOUNTER
Isamar KANIKA Smith needs refill of   Requested Prescriptions     Pending Prescriptions Disp Refills    lisinopril-hydroCHLOROthiazide (PRINZIDE;ZESTORETIC) 10-12.5 MG per tablet [Pharmacy Med Name: Lisinopril-hydroCHLOROthiazide 10-12.5 MG Oral Tablet] 90 tablet 0     Sig: Take 1 tablet by mouth once daily       Last Filled on:  08- #90 R-1 and sent to [facility] in [place], [place] by Gina Howell CNP.      Last Visit Date:  02/07/2024    Next Visit Date:  Visit date not found

## 2024-02-22 ENCOUNTER — PATIENT MESSAGE (OUTPATIENT)
Dept: FAMILY MEDICINE CLINIC | Age: 49
End: 2024-02-22

## 2024-02-22 NOTE — TELEPHONE ENCOUNTER
From: Isamar Smith  To: Gina Howell  Sent: 2/22/2024 10:25 AM EST  Subject: Home sick again today…ugh!     Hi Gina,   I know you are not in today (Thurs), but wanted to send a message asking if you think I should bring Ambrosio & myself in tomorrow if you have any “walk-in” times available. Last night (Wed), Ambrosio & I both had fevers (around 100.5) which is low grade but we are coming up on three weeks with this bug wrapping back around & nailing us. I scheduled both of us to stay home today and tomorrow (Fri) to provide resting & recovery time again. Is this something we should come see you for or continue to let it play out?     Ambrosio had a fever of 101-102 over President’s Day weekend but it had gone away so he went to school Tues. & Wed. He had some cough/congestion & got tired out easily those days as well. He barely made it to 7pm Wed. night before crawling into bed & being asleep within minutes after laying on the couch at least an hour before we ate supper at 6:30. He slept til 8:30 this morning. So, it is clear something is going on bc he’s never that still unless he’s not feeling well. No fever this morning, but I’ll check again later.     I went back to work Tues & Wed. as well & was whooped by the end of each school day. Low grade fever () those days as well. You mentioned the cough would linger so that wasn’t surprising. Had vomiting on Wed. evening possibly post nasal drip/excessive coughing caused it?    Sarkis & my coworkers wanted me to check in with you about whether you think the appointment will help.   Sorry this is so long. Thank you for all you do!   Isamar Smith

## 2024-02-23 ENCOUNTER — OFFICE VISIT (OUTPATIENT)
Dept: FAMILY MEDICINE CLINIC | Age: 49
End: 2024-02-23
Payer: COMMERCIAL

## 2024-02-23 VITALS
SYSTOLIC BLOOD PRESSURE: 128 MMHG | TEMPERATURE: 97.4 F | WEIGHT: 293 LBS | OXYGEN SATURATION: 98 % | BODY MASS INDEX: 47.29 KG/M2 | DIASTOLIC BLOOD PRESSURE: 80 MMHG | HEART RATE: 77 BPM

## 2024-02-23 DIAGNOSIS — J10.1 INFLUENZA A: ICD-10-CM

## 2024-02-23 DIAGNOSIS — J40 BRONCHITIS: Primary | ICD-10-CM

## 2024-02-23 DIAGNOSIS — R06.81 BREATHLESSNESS: ICD-10-CM

## 2024-02-23 PROCEDURE — 1036F TOBACCO NON-USER: CPT | Performed by: NURSE PRACTITIONER

## 2024-02-23 PROCEDURE — G8417 CALC BMI ABV UP PARAM F/U: HCPCS | Performed by: NURSE PRACTITIONER

## 2024-02-23 PROCEDURE — G8484 FLU IMMUNIZE NO ADMIN: HCPCS | Performed by: NURSE PRACTITIONER

## 2024-02-23 PROCEDURE — G8427 DOCREV CUR MEDS BY ELIG CLIN: HCPCS | Performed by: NURSE PRACTITIONER

## 2024-02-23 PROCEDURE — 3074F SYST BP LT 130 MM HG: CPT | Performed by: NURSE PRACTITIONER

## 2024-02-23 PROCEDURE — 3079F DIAST BP 80-89 MM HG: CPT | Performed by: NURSE PRACTITIONER

## 2024-02-23 PROCEDURE — 99213 OFFICE O/P EST LOW 20 MIN: CPT | Performed by: NURSE PRACTITIONER

## 2024-02-23 RX ORDER — DEXAMETHASONE 6 MG/1
6 TABLET ORAL
Qty: 10 TABLET | Refills: 0 | Status: SHIPPED | OUTPATIENT
Start: 2024-02-23 | End: 2024-03-04

## 2024-02-23 RX ORDER — AZELASTINE 1 MG/ML
2 SPRAY, METERED NASAL 2 TIMES DAILY
Qty: 60 ML | Refills: 5 | Status: SHIPPED | OUTPATIENT
Start: 2024-02-23

## 2024-02-23 RX ORDER — AZITHROMYCIN 250 MG/1
TABLET, FILM COATED ORAL
Qty: 6 TABLET | Refills: 0 | Status: SHIPPED | OUTPATIENT
Start: 2024-02-23 | End: 2024-03-04

## 2024-02-23 RX ORDER — GUAIFENESIN 600 MG/1
1200 TABLET, EXTENDED RELEASE ORAL 2 TIMES DAILY
Qty: 40 TABLET | Refills: 0 | Status: SHIPPED | OUTPATIENT
Start: 2024-02-23 | End: 2024-03-04

## 2024-02-23 SDOH — ECONOMIC STABILITY: INCOME INSECURITY: HOW HARD IS IT FOR YOU TO PAY FOR THE VERY BASICS LIKE FOOD, HOUSING, MEDICAL CARE, AND HEATING?: NOT HARD AT ALL

## 2024-02-23 SDOH — ECONOMIC STABILITY: FOOD INSECURITY: WITHIN THE PAST 12 MONTHS, YOU WORRIED THAT YOUR FOOD WOULD RUN OUT BEFORE YOU GOT MONEY TO BUY MORE.: NEVER TRUE

## 2024-02-23 SDOH — ECONOMIC STABILITY: FOOD INSECURITY: WITHIN THE PAST 12 MONTHS, THE FOOD YOU BOUGHT JUST DIDN'T LAST AND YOU DIDN'T HAVE MONEY TO GET MORE.: NEVER TRUE

## 2024-02-23 ASSESSMENT — PATIENT HEALTH QUESTIONNAIRE - PHQ9
SUM OF ALL RESPONSES TO PHQ QUESTIONS 1-9: 1
2. FEELING DOWN, DEPRESSED OR HOPELESS: 0
5. POOR APPETITE OR OVEREATING: 0
10. IF YOU CHECKED OFF ANY PROBLEMS, HOW DIFFICULT HAVE THESE PROBLEMS MADE IT FOR YOU TO DO YOUR WORK, TAKE CARE OF THINGS AT HOME, OR GET ALONG WITH OTHER PEOPLE: 0
6. FEELING BAD ABOUT YOURSELF - OR THAT YOU ARE A FAILURE OR HAVE LET YOURSELF OR YOUR FAMILY DOWN: 0
1. LITTLE INTEREST OR PLEASURE IN DOING THINGS: 1
3. TROUBLE FALLING OR STAYING ASLEEP: 0
8. MOVING OR SPEAKING SO SLOWLY THAT OTHER PEOPLE COULD HAVE NOTICED. OR THE OPPOSITE, BEING SO FIGETY OR RESTLESS THAT YOU HAVE BEEN MOVING AROUND A LOT MORE THAN USUAL: 0
SUM OF ALL RESPONSES TO PHQ QUESTIONS 1-9: 1
9. THOUGHTS THAT YOU WOULD BE BETTER OFF DEAD, OR OF HURTING YOURSELF: 0
SUM OF ALL RESPONSES TO PHQ9 QUESTIONS 1 & 2: 1
7. TROUBLE CONCENTRATING ON THINGS, SUCH AS READING THE NEWSPAPER OR WATCHING TELEVISION: 0
4. FEELING TIRED OR HAVING LITTLE ENERGY: 0

## 2024-02-23 NOTE — PROGRESS NOTES
[address] [place] [address]  Dept: [phone]          Isamar Smith (:  1975) is a 48 y.o. female,Established patient, here for evaluation of the following chief complaint(s):  Cough (congestion, never felt better since she was here last time.)      ASSESSMENT/PLAN:  I have reviewed the patient's medical history in detail and updated the computerized patient record.  HPI/ROS per the patient and caregiver.   Overall non toxic in appearance. Answers questions appropriately.   Conditions discussed and addressed this visit include:   Here for follow up  Overall not much improved  Continues home measures  Continue to push fluids  Continue antihistamine  Medications discussed and pt agreeable  Follow up next week if no improvement  1. Bronchitis  -     dexAMETHasone (DECADRON) 6 MG tablet; Take 1 tablet by mouth daily (with breakfast) for 10 days, Disp-10 tablet, R-0Normal  -     azithromycin (ZITHROMAX) 250 MG tablet; 500mg on day 1 followed by 250mg on days 2 - 5, Disp-6 tablet, R-0Normal  -     guaiFENesin (MUCINEX) 600 MG extended release tablet; Take 2 tablets by mouth 2 times daily for 10 days, Disp-40 tablet, R-0Normal  -     azelastine (ASTELIN) 0.1 % nasal spray; 2 sprays by Nasal route 2 times daily Use in each nostril as directed, Disp-60 mL, R-5Normal  2. Influenza A  -     dexAMETHasone (DECADRON) 6 MG tablet; Take 1 tablet by mouth daily (with breakfast) for 10 days, Disp-10 tablet, R-0Normal  -     azithromycin (ZITHROMAX) 250 MG tablet; 500mg on day 1 followed by 250mg on days 2 - 5, Disp-6 tablet, R-0Normal  -     guaiFENesin (MUCINEX) 600 MG extended release tablet; Take 2 tablets by mouth 2 times daily for 10 days, Disp-40 tablet, R-0Normal  -     azelastine (ASTELIN) 0.1 % nasal spray; 2 sprays by Nasal route 2 times daily Use in each nostril as directed, Disp-60 mL, R-5Normal  3. Breathlessness  -     dexAMETHasone (DECADRON) 6 MG tablet; Take 1 tablet by mouth daily

## 2024-02-25 PROBLEM — R60.1 GENERALIZED EDEMA: Status: RESOLVED | Noted: 2021-08-10 | Resolved: 2024-02-25

## 2024-02-27 ENCOUNTER — PATIENT MESSAGE (OUTPATIENT)
Dept: FAMILY MEDICINE CLINIC | Age: 49
End: 2024-02-27

## 2024-02-27 DIAGNOSIS — R05.3 CHRONIC COUGH: ICD-10-CM

## 2024-02-27 DIAGNOSIS — R06.81 BREATHLESSNESS: ICD-10-CM

## 2024-02-27 DIAGNOSIS — J40 BRONCHITIS: Primary | ICD-10-CM

## 2024-02-27 DIAGNOSIS — D84.9 IMMUNOSUPPRESSION (HCC): ICD-10-CM

## 2024-02-28 ENCOUNTER — TELEPHONE (OUTPATIENT)
Dept: NEPHROLOGY | Age: 49
End: 2024-02-28

## 2024-02-28 DIAGNOSIS — I10 ESSENTIAL HYPERTENSION: ICD-10-CM

## 2024-02-28 DIAGNOSIS — R80.0 ISOLATED NON-NEPHROTIC PROTEINURIA: ICD-10-CM

## 2024-02-28 DIAGNOSIS — E11.21 DIABETIC NEPHROPATHY WITH PROTEINURIA (HCC): ICD-10-CM

## 2024-02-28 DIAGNOSIS — N18.2 CKD (CHRONIC KIDNEY DISEASE), STAGE II: Primary | ICD-10-CM

## 2024-02-28 NOTE — TELEPHONE ENCOUNTER
From: Isamar Smith  To: Ginasowmya Howell  Sent: 2/27/2024 4:56 PM EST  Subject: Upcoming care plan since the extended illness is continuing     Joaquin Fuentes,  I know it’s only been a few days since I’ve been in to see you, but I wanted to check about where we are heading with any additional days off for the following week if needed. The way I’m leaning now/feeling currently is that I’m doubtful about returning until after this current round of steroids is done. Because it has seemed that once the steroids are out of my system whatever this crud is creeps back in?!? The current round of steroids goes until next Tuesday I believe. I’m not trying to borrow trouble. Just trying to plan ahead a bit if we can do I can let work know and have my ducks in a row on if I need to be off. My ears are plugged/difficult to hear and my voice is not completely back due to drainage?!?     I was wondering if I should come see you again tomorrow or Friday to check in on the plan.     Thank you for your time and consideration!  Isamar

## 2024-03-01 ENCOUNTER — HOSPITAL ENCOUNTER (OUTPATIENT)
Dept: GENERAL RADIOLOGY | Age: 49
Discharge: HOME | End: 2024-03-01
Payer: COMMERCIAL

## 2024-03-01 ENCOUNTER — HOSPITAL ENCOUNTER (OUTPATIENT)
Age: 49
Discharge: HOME | End: 2024-03-01
Payer: COMMERCIAL

## 2024-03-01 DIAGNOSIS — D84.9 IMMUNOSUPPRESSION (HCC): ICD-10-CM

## 2024-03-01 DIAGNOSIS — R05.3 CHRONIC COUGH: ICD-10-CM

## 2024-03-01 DIAGNOSIS — J40 BRONCHITIS: ICD-10-CM

## 2024-03-01 DIAGNOSIS — R06.81 BREATHLESSNESS: ICD-10-CM

## 2024-03-01 PROCEDURE — 71046 X-RAY EXAM CHEST 2 VIEWS: CPT

## 2024-03-01 NOTE — TELEPHONE ENCOUNTER
I e mailed the work note to the patient at [email or web address] and received a confirmation back.

## 2024-03-04 ENCOUNTER — OFFICE VISIT (OUTPATIENT)
Dept: PULMONOLOGY | Age: 49
End: 2024-03-04
Payer: COMMERCIAL

## 2024-03-04 VITALS
OXYGEN SATURATION: 96 % | BODY MASS INDEX: 44.41 KG/M2 | TEMPERATURE: 97.6 F | HEART RATE: 71 BPM | SYSTOLIC BLOOD PRESSURE: 142 MMHG | WEIGHT: 293 LBS | DIASTOLIC BLOOD PRESSURE: 82 MMHG | HEIGHT: 68 IN

## 2024-03-04 DIAGNOSIS — J30.2 SEASONAL ALLERGIC RHINITIS DUE TO FUNGAL SPORES: ICD-10-CM

## 2024-03-04 DIAGNOSIS — L40.50 PSORIATIC ARTHRITIS (HCC): ICD-10-CM

## 2024-03-04 DIAGNOSIS — R05.2 SUBACUTE COUGH: Primary | ICD-10-CM

## 2024-03-04 PROBLEM — F41.9 ANXIETY: Status: ACTIVE | Noted: 2024-03-04

## 2024-03-04 PROCEDURE — 3077F SYST BP >= 140 MM HG: CPT | Performed by: INTERNAL MEDICINE

## 2024-03-04 PROCEDURE — 3079F DIAST BP 80-89 MM HG: CPT | Performed by: INTERNAL MEDICINE

## 2024-03-04 PROCEDURE — 99204 OFFICE O/P NEW MOD 45 MIN: CPT | Performed by: INTERNAL MEDICINE

## 2024-03-04 ASSESSMENT — ENCOUNTER SYMPTOMS
WHEEZING: 1
HEARTBURN: 0
TROUBLE SWALLOWING: 0
HEMOPTYSIS: 0
SINUS PRESSURE: 1
COUGH: 1
SHORTNESS OF BREATH: 1

## 2024-03-04 NOTE — PROGRESS NOTES
[facility]    Patient: SHIRLEY YOO, 48 y.o.   : 1975  3/4/2024         Subjective     Chief Complaint   Patient presents with    New Patient     Pulmonary consult for chronic cough/breathlessness, referred by Gina Howell, chest XR 3/1/24, bronchial challenge & PFT 22.        Cough  This is a new problem. Episode onset: . The problem has been gradually improving (reportedly had flu. Got on some prednisone. Then got influenza. Got that in between,  week of ary. Got on steroids again. Having fevers.Starting to getting a little better.). The cough is Non-productive (very mild production now. Coughing getting better though.). Associated symptoms include ear congestion, ear pain, shortness of breath and wheezing (wheezing a little better.). Pertinent negatives include no chest pain, chills, fever, heartburn (takes famotidine.), hemoptysis, nasal congestion, rash or weight loss. The symptoms are aggravated by exercise (jsut moving around makes cough and SOB worse.). She has tried oral steroids, a beta-agonist inhaler and ipratropium inhaler (first round seemed to help a lot, subsequent rounds hasnt really helped that much) for the symptoms. Improvement on treatment: inhalers sort of help. Albuterol reportedly helps. Her past medical history is significant for bronchitis. There is no history of asthma.       Patient has not been admitted or treated for pneumonia, pulmonary embolism, or respiratory failure.  Patient has not been intubated for reasons other than planned procedures.      Social History  Patient job history included .   Patient has not had exposure to aerosolized particles or hazardous fumes. (Coal, dust, asbestos, molds ie Hay)  Non smoker  denies exposure to tuberculosis.    Past Medical hx   PMH:  Past Medical History:   Diagnosis Date    Arthritis     Hypertension     Mild depression     PCOS (polycystic ovarian syndrome)

## 2024-03-06 ENCOUNTER — OFFICE VISIT (OUTPATIENT)
Dept: FAMILY MEDICINE CLINIC | Age: 49
End: 2024-03-06
Payer: COMMERCIAL

## 2024-03-06 VITALS — OXYGEN SATURATION: 98 % | SYSTOLIC BLOOD PRESSURE: 130 MMHG | HEART RATE: 84 BPM | DIASTOLIC BLOOD PRESSURE: 82 MMHG

## 2024-03-06 DIAGNOSIS — D84.9 IMMUNOSUPPRESSION (HCC): ICD-10-CM

## 2024-03-06 DIAGNOSIS — K21.00 GASTROESOPHAGEAL REFLUX DISEASE WITH ESOPHAGITIS WITHOUT HEMORRHAGE: ICD-10-CM

## 2024-03-06 DIAGNOSIS — J40 BRONCHITIS: Primary | ICD-10-CM

## 2024-03-06 DIAGNOSIS — R06.81 BREATHLESSNESS: ICD-10-CM

## 2024-03-06 DIAGNOSIS — J30.2 SEASONAL ALLERGIC RHINITIS DUE TO FUNGAL SPORES: ICD-10-CM

## 2024-03-06 DIAGNOSIS — I10 HYPERTENSION, UNSPECIFIED TYPE: ICD-10-CM

## 2024-03-06 PROCEDURE — 3079F DIAST BP 80-89 MM HG: CPT | Performed by: NURSE PRACTITIONER

## 2024-03-06 PROCEDURE — 99214 OFFICE O/P EST MOD 30 MIN: CPT | Performed by: NURSE PRACTITIONER

## 2024-03-06 PROCEDURE — 1036F TOBACCO NON-USER: CPT | Performed by: NURSE PRACTITIONER

## 2024-03-06 PROCEDURE — G8484 FLU IMMUNIZE NO ADMIN: HCPCS | Performed by: NURSE PRACTITIONER

## 2024-03-06 PROCEDURE — 3075F SYST BP GE 130 - 139MM HG: CPT | Performed by: NURSE PRACTITIONER

## 2024-03-06 PROCEDURE — G8427 DOCREV CUR MEDS BY ELIG CLIN: HCPCS | Performed by: NURSE PRACTITIONER

## 2024-03-06 PROCEDURE — G8417 CALC BMI ABV UP PARAM F/U: HCPCS | Performed by: NURSE PRACTITIONER

## 2024-03-06 RX ORDER — PANTOPRAZOLE SODIUM 40 MG/1
40 TABLET, DELAYED RELEASE ORAL
Qty: 90 TABLET | Refills: 0 | Status: SHIPPED | OUTPATIENT
Start: 2024-03-06

## 2024-03-06 RX ORDER — AZELASTINE HCL 205.5 UG/1
2 SPRAY NASAL DAILY
Qty: 4 ML | Refills: 0
Start: 2024-03-06

## 2024-03-06 RX ORDER — LOSARTAN POTASSIUM AND HYDROCHLOROTHIAZIDE 12.5; 5 MG/1; MG/1
1 TABLET ORAL DAILY
Qty: 30 TABLET | Refills: 0 | Status: SHIPPED | OUTPATIENT
Start: 2024-03-06

## 2024-03-06 ASSESSMENT — ENCOUNTER SYMPTOMS
EYE ITCHING: 0
SORE THROAT: 1
EYE REDNESS: 0
RHINORRHEA: 1
SINUS PRESSURE: 1
CHEST TIGHTNESS: 1
BACK PAIN: 1
SHORTNESS OF BREATH: 1
TROUBLE SWALLOWING: 0
COUGH: 1
SINUS PAIN: 1
EYE PAIN: 0
NAUSEA: 1
WHEEZING: 1

## 2024-03-06 NOTE — PROGRESS NOTES
[address] [place] [id number]  Dept: [phone]          Isamar Smith (:  1975) is a 48 y.o. female,Established patient, here for evaluation of the following chief complaint(s):  Follow-up and Ear Fullness (Right side, sore throat.)      ASSESSMENT/PLAN:  I have reviewed the patient's medical history in detail and updated the computerized patient record.  HPI/ROS per the patient and caregiver.   Overall non toxic in appearance. Answers questions appropriately.   Conditions discussed and addressed this visit include:   Here for follow up  Will extend work note until first   Orencia due end of month  Schedule pft for 2-3 weeks out  Changed lisinopril to losartan.   Add pantoprazole for 30 days and see if these changes help wit hcough  Continue to work on building immunity  Increase fluids  Supportive measures  The patient is advised to follow a low fat, low cholesterol diet, attempt to lose weight, reduce salt in diet and cooking, reduce exposure to stress, improve dietary compliance, use calcium 1 gram daily with Vit D, continue current medications, and continue current healthy lifestyle patterns.    1. Bronchitis  -     azelastine HCl (ASTEPRO) 0.15 % SOLN; 2 sprays by Nasal route daily, Disp-4 mL, R-0NO PRINT  2. Immunosuppression (HCC)Being evaluated/managed by pcp/rheumatology. No acute findings today meriting change in management plan.  3. Hypertension, unspecified type  -     losartan-hydroCHLOROthiazide (HYZAAR) 50-12.5 MG per tablet; Take 1 tablet by mouth daily, Disp-30 tablet, R-0Normal  4. Seasonal allergic rhinitis due to fungal spores  5. Breathlessness  6. Gastroesophageal reflux disease with esophagitis without hemorrhage  -     pantoprazole (PROTONIX) 40 MG tablet; Take 1 tablet by mouth every morning (before breakfast), Disp-90 tablet, R-0Normal      Return in about 4 weeks (around 4/3/2024) for Medication refill, Routine follow up, Results

## 2024-03-07 ENCOUNTER — PATIENT MESSAGE (OUTPATIENT)
Dept: FAMILY MEDICINE CLINIC | Age: 49
End: 2024-03-07

## 2024-03-07 NOTE — TELEPHONE ENCOUNTER
From: Isamar Smith  To: Gina Howell  Sent: 3/7/2024 8:47 AM EST  Subject: [employer] paperwork    Hi Gina,   I hope this finds you well. I wanted to send you a copy of the paperwork that I've completed on my end (haven't submitted yet bc I have one more piece to fill in once I get my paystub on Friday).    I talked with [employer] today and asked how they wanted things filled in on the date of onset and last date of work. The onset of all this was 2/7/24, but the last day of work for me was 2/21/24 since I'd kept trying to go back.     I let them know there is an anticipated return date of 4/2/24, but that I have a follow up with you on 4/1 to be sure of that. They mentioned that if the return date needs to change, you and I would need to resubmit a new form with the updated information, but only if the date changes.     [employer] assured me that if they have questions once we submit items that they will reach out to either you, school, or myself.    They also asked that you go ahead and send in the information when you have it completed so it is coming directly from your office. If you would send yours either late on Friday or next week, that would be helpful bc that would give me a chance to get my paperwork uploaded and in their system ahead of yours.    Thank you again for all of your help with this time of extended sick leave.   Take care,  Isamar

## 2024-04-01 ENCOUNTER — PATIENT MESSAGE (OUTPATIENT)
Dept: FAMILY MEDICINE CLINIC | Age: 49
End: 2024-04-01

## 2024-04-01 ENCOUNTER — NURSE ONLY (OUTPATIENT)
Dept: LAB | Age: 49
End: 2024-04-01

## 2024-04-01 ENCOUNTER — OFFICE VISIT (OUTPATIENT)
Dept: FAMILY MEDICINE CLINIC | Age: 49
End: 2024-04-01
Payer: COMMERCIAL

## 2024-04-01 VITALS
DIASTOLIC BLOOD PRESSURE: 86 MMHG | SYSTOLIC BLOOD PRESSURE: 136 MMHG | BODY MASS INDEX: 51.91 KG/M2 | HEART RATE: 72 BPM | WEIGHT: 293 LBS | OXYGEN SATURATION: 97 % | TEMPERATURE: 97.3 F

## 2024-04-01 DIAGNOSIS — R60.1 GENERALIZED EDEMA: ICD-10-CM

## 2024-04-01 DIAGNOSIS — H65.21 RIGHT CHRONIC SEROUS OTITIS MEDIA: Primary | ICD-10-CM

## 2024-04-01 DIAGNOSIS — K21.00 GASTROESOPHAGEAL REFLUX DISEASE WITH ESOPHAGITIS WITHOUT HEMORRHAGE: ICD-10-CM

## 2024-04-01 DIAGNOSIS — U07.1 COVID: ICD-10-CM

## 2024-04-01 DIAGNOSIS — R09.82 POST-NASAL DRIP: ICD-10-CM

## 2024-04-01 DIAGNOSIS — D84.9 IMMUNOSUPPRESSION (HCC): ICD-10-CM

## 2024-04-01 DIAGNOSIS — E11.65 CONTROLLED TYPE 2 DIABETES MELLITUS WITH HYPERGLYCEMIA, WITHOUT LONG-TERM CURRENT USE OF INSULIN (HCC): ICD-10-CM

## 2024-04-01 DIAGNOSIS — R05.3 CHRONIC COUGH: ICD-10-CM

## 2024-04-01 DIAGNOSIS — R80.9 MICROALBUMINURIA: ICD-10-CM

## 2024-04-01 DIAGNOSIS — I10 HYPERTENSION, UNSPECIFIED TYPE: ICD-10-CM

## 2024-04-01 DIAGNOSIS — L40.50 PSORIATIC ARTHRITIS (HCC): ICD-10-CM

## 2024-04-01 DIAGNOSIS — J40 BRONCHITIS: ICD-10-CM

## 2024-04-01 DIAGNOSIS — R11.10 MORNING VOMITING: ICD-10-CM

## 2024-04-01 DIAGNOSIS — F32.89 OTHER DEPRESSION: ICD-10-CM

## 2024-04-01 DIAGNOSIS — E88.819 INSULIN RESISTANCE: ICD-10-CM

## 2024-04-01 DIAGNOSIS — J32.1 CHRONIC FRONTAL SINUSITIS: ICD-10-CM

## 2024-04-01 LAB
ANION GAP SERPL CALC-SCNC: 17 MEQ/L (ref 8–16)
BUN SERPL-MCNC: 11 MG/DL (ref 7–22)
CALCIUM SERPL-MCNC: 9.3 MG/DL (ref 8.5–10.5)
CHLORIDE SERPL-SCNC: 103 MEQ/L (ref 98–111)
CO2 SERPL-SCNC: 23 MEQ/L (ref 23–33)
CREAT SERPL-MCNC: 0.5 MG/DL (ref 0.4–1.2)
DEPRECATED MEAN GLUCOSE BLD GHB EST-ACNC: 150 MG/DL (ref 70–126)
GFR SERPL CREATININE-BSD FRML MDRD: > 90 ML/MIN/1.73M2
GLUCOSE SERPL-MCNC: 132 MG/DL (ref 70–108)
HBA1C MFR BLD HPLC: 7 % (ref 4.4–6.4)
NT-PROBNP SERPL IA-MCNC: 56.5 PG/ML (ref 0–124)
POTASSIUM SERPL-SCNC: 3.9 MEQ/L (ref 3.5–5.2)
SODIUM SERPL-SCNC: 143 MEQ/L (ref 135–145)
TSH SERPL DL<=0.005 MIU/L-ACNC: 2.3 UIU/ML (ref 0.4–4.2)

## 2024-04-01 PROCEDURE — 2022F DILAT RTA XM EVC RTNOPTHY: CPT | Performed by: NURSE PRACTITIONER

## 2024-04-01 PROCEDURE — G8427 DOCREV CUR MEDS BY ELIG CLIN: HCPCS | Performed by: NURSE PRACTITIONER

## 2024-04-01 PROCEDURE — G8417 CALC BMI ABV UP PARAM F/U: HCPCS | Performed by: NURSE PRACTITIONER

## 2024-04-01 PROCEDURE — 3075F SYST BP GE 130 - 139MM HG: CPT | Performed by: NURSE PRACTITIONER

## 2024-04-01 PROCEDURE — 99214 OFFICE O/P EST MOD 30 MIN: CPT | Performed by: NURSE PRACTITIONER

## 2024-04-01 PROCEDURE — 1036F TOBACCO NON-USER: CPT | Performed by: NURSE PRACTITIONER

## 2024-04-01 PROCEDURE — 3046F HEMOGLOBIN A1C LEVEL >9.0%: CPT | Performed by: NURSE PRACTITIONER

## 2024-04-01 PROCEDURE — 3079F DIAST BP 80-89 MM HG: CPT | Performed by: NURSE PRACTITIONER

## 2024-04-01 RX ORDER — PANTOPRAZOLE SODIUM 40 MG/1
40 TABLET, DELAYED RELEASE ORAL
Qty: 90 TABLET | Refills: 3 | Status: SHIPPED | OUTPATIENT
Start: 2024-04-01

## 2024-04-01 RX ORDER — BUMETANIDE 1 MG/1
1 TABLET ORAL 2 TIMES DAILY
Qty: 60 TABLET | Refills: 5 | Status: SHIPPED | OUTPATIENT
Start: 2024-04-01

## 2024-04-01 RX ORDER — POTASSIUM CHLORIDE 20 MEQ/1
20 TABLET, EXTENDED RELEASE ORAL DAILY
Qty: 90 TABLET | Refills: 1 | Status: SHIPPED | OUTPATIENT
Start: 2024-04-01

## 2024-04-01 RX ORDER — LOSARTAN POTASSIUM AND HYDROCHLOROTHIAZIDE 12.5; 5 MG/1; MG/1
1 TABLET ORAL DAILY
Qty: 30 TABLET | Refills: 0 | OUTPATIENT
Start: 2024-04-01

## 2024-04-01 RX ORDER — ALBUTEROL SULFATE 90 UG/1
AEROSOL, METERED RESPIRATORY (INHALATION)
Qty: 18 G | Refills: 5 | Status: SHIPPED | OUTPATIENT
Start: 2024-04-01

## 2024-04-01 RX ORDER — FAMOTIDINE 40 MG/1
40 TABLET, FILM COATED ORAL NIGHTLY
Qty: 90 TABLET | Refills: 3 | Status: SHIPPED | OUTPATIENT
Start: 2024-04-01

## 2024-04-01 RX ORDER — LEVOCETIRIZINE DIHYDROCHLORIDE 5 MG/1
5 TABLET, FILM COATED ORAL NIGHTLY
Qty: 90 TABLET | Refills: 3 | Status: SHIPPED | OUTPATIENT
Start: 2024-04-01

## 2024-04-01 RX ORDER — SERTRALINE HYDROCHLORIDE 100 MG/1
100 TABLET, FILM COATED ORAL DAILY
Qty: 90 TABLET | Refills: 3 | Status: SHIPPED | OUTPATIENT
Start: 2024-04-01

## 2024-04-01 RX ORDER — LOSARTAN POTASSIUM AND HYDROCHLOROTHIAZIDE 12.5; 5 MG/1; MG/1
1 TABLET ORAL DAILY
Qty: 90 TABLET | Refills: 3 | Status: SHIPPED | OUTPATIENT
Start: 2024-04-01

## 2024-04-01 ASSESSMENT — ENCOUNTER SYMPTOMS
SHORTNESS OF BREATH: 0
TROUBLE SWALLOWING: 0
EYE ITCHING: 0
SINUS PRESSURE: 0
SINUS PAIN: 0
BACK PAIN: 1
EYE PAIN: 0
COUGH: 1
EYE REDNESS: 0
NAUSEA: 0
CONSTIPATION: 0
CHEST TIGHTNESS: 1
RHINORRHEA: 0
VOMITING: 0
SORE THROAT: 0

## 2024-04-01 NOTE — PROGRESS NOTES
gain and significant edema of the lower legs  No increase in sob  No chest pain  Review of Systems   Constitutional:  Positive for fatigue. Negative for activity change, appetite change and fever.   HENT:  Positive for congestion and ear pain. Negative for ear discharge, postnasal drip, rhinorrhea, sinus pressure, sinus pain, sore throat and trouble swallowing.    Eyes:  Negative for pain, redness and itching.   Respiratory:  Positive for cough and chest tightness. Negative for shortness of breath and wheezing.    Cardiovascular:  Positive for leg swelling. Negative for chest pain and palpitations.   Gastrointestinal:  Negative for constipation, diarrhea, nausea and vomiting.   Endocrine: Negative for cold intolerance and heat intolerance.   Genitourinary: Negative.    Musculoskeletal:  Positive for arthralgias, back pain and myalgias. Negative for neck pain and neck stiffness.   Skin:  Negative for pallor.   Allergic/Immunologic: Positive for environmental allergies.   Neurological:  Positive for headaches. Negative for light-headedness.   Hematological:  Negative for adenopathy. Does not bruise/bleed easily.   Psychiatric/Behavioral: Negative.         Physical Exam  Vitals and nursing note reviewed.   Constitutional:       Appearance: Normal appearance. She is normal weight. She is not ill-appearing.   HENT:      Head: Normocephalic and atraumatic.      Right Ear: Ear canal and external ear normal. Decreased hearing noted. A middle ear effusion is present. Tympanic membrane is injected and bulging. Tympanic membrane is not erythematous.      Left Ear: Ear canal and external ear normal. Tympanic membrane is not injected, erythematous or bulging.      Nose: Rhinorrhea present. No nasal tenderness or congestion.      Mouth/Throat:      Lips: Pink.      Mouth: Mucous membranes are dry.      Pharynx: Posterior oropharyngeal erythema present. No pharyngeal swelling, oropharyngeal exudate or uvula swelling.      Tonsils:

## 2024-04-01 NOTE — TELEPHONE ENCOUNTER
Isamar Smith needs refill of   Requested Prescriptions     Pending Prescriptions Disp Refills    losartan-hydroCHLOROthiazide (HYZAAR) 50-12.5 MG per tablet [Pharmacy Med Name: Losartan Potassium-HCTZ 50-12.5 MG Oral Tablet] 30 tablet 0     Sig: Take 1 tablet by mouth once daily       Last Filled on:  03- #30 R-0 and sent to [facility] in [place], [place] by Gina Howell CNP.      Last Visit Date:  04/01/2024    Next Visit Date:  Visit date not found

## 2024-04-01 NOTE — TELEPHONE ENCOUNTER
From: Isamar Smith  To: Gina Howell  Sent: 4/1/2024 3:34 PM EDT  Subject: Attending physician form for disability insurance    Hi Gina,  Thank you for being willing to fill out the extension of disability physician's statement. I am attaching the whole set of forms I received so that you can download it and type in your answers if you prefer.     You can fax it to [employer] or I can come pick it up and send it in digitally. It is up to you.    Thank you for your time today! I'll message you Friday with my weight. Looking forward to this fluid retention going down.    Take care,  Isamar

## 2024-04-02 ENCOUNTER — TELEPHONE (OUTPATIENT)
Dept: FAMILY MEDICINE CLINIC | Age: 49
End: 2024-04-02

## 2024-04-02 NOTE — TELEPHONE ENCOUNTER
----- Message from THU Johnson CNP sent at 4/1/2024  7:25 PM EDT -----  Labs are fairly stable. No evidence of congestive heart failure. Edema is localized to legs. Continue meds as ordered. Bump in A1c is most likely due to current steroid use. Will add januvia 1 tablet per day and see if we can get better control. It looks like it is covered. If it is too expensive, please let me know. Continue to work on diet and low carb foods. Continue meds as ordered today. Would take the potassium on the days of using the bumex. Meds updated and refilled.

## 2024-04-10 ENCOUNTER — HOSPITAL ENCOUNTER (OUTPATIENT)
Dept: PULMONOLOGY | Age: 49
Discharge: HOME | End: 2024-04-10
Payer: COMMERCIAL

## 2024-04-10 DIAGNOSIS — R06.81 BREATHLESSNESS: ICD-10-CM

## 2024-04-10 DIAGNOSIS — D84.9 IMMUNOSUPPRESSION (HCC): ICD-10-CM

## 2024-04-10 DIAGNOSIS — J40 BRONCHITIS: ICD-10-CM

## 2024-04-10 DIAGNOSIS — R05.3 CHRONIC COUGH: ICD-10-CM

## 2024-04-10 PROCEDURE — 94010 BREATHING CAPACITY TEST: CPT

## 2024-04-10 PROCEDURE — 6360000002 HC RX W HCPCS

## 2024-04-10 PROCEDURE — 94729 DIFFUSING CAPACITY: CPT

## 2024-04-10 PROCEDURE — 94070 EVALUATION OF WHEEZING: CPT

## 2024-04-10 PROCEDURE — 94726 PLETHYSMOGRAPHY LUNG VOLUMES: CPT

## 2024-04-12 ENCOUNTER — TELEPHONE (OUTPATIENT)
Dept: FAMILY MEDICINE CLINIC | Age: 49
End: 2024-04-12

## 2024-04-12 NOTE — TELEPHONE ENCOUNTER
----- Message from THU Johnson - CNP sent at 4/11/2024  4:23 PM EDT -----  PFT and asthma challenge are all wnl. NO evidence for asthma or obstructive lung disease. Would suggest talking with rheumatology to see if this breathlessness could be an aspect of the rheumatoid arthritis.

## 2024-04-12 NOTE — TELEPHONE ENCOUNTER
I copy and pasted Gina's response regarding the patient's PFT and Asthma Challenge results and sent them to the patient via My Chart.

## 2024-04-29 RX ORDER — SITAGLIPTIN 100 MG/1
100 TABLET, FILM COATED ORAL DAILY
Qty: 90 TABLET | Refills: 1 | Status: SHIPPED | OUTPATIENT
Start: 2024-04-29

## 2024-04-29 NOTE — TELEPHONE ENCOUNTER
Isamar Smith needs refill of   Requested Prescriptions     Pending Prescriptions Disp Refills    JANUVIA 100 MG tablet [Pharmacy Med Name: Januvia 100 MG Oral Tablet] 30 tablet 0     Sig: Take 1 tablet by mouth once daily       Last Filled on:  04- #30 R-0 and sent to [facility] in [place], [place] by Gina Howell CNP.      Last Visit Date:  04/01/2024    Next Visit Date:  05/03/2024    yes

## 2024-05-03 ENCOUNTER — OFFICE VISIT (OUTPATIENT)
Dept: FAMILY MEDICINE CLINIC | Age: 49
End: 2024-05-03
Payer: COMMERCIAL

## 2024-05-03 VITALS
TEMPERATURE: 97.6 F | WEIGHT: 293 LBS | SYSTOLIC BLOOD PRESSURE: 138 MMHG | OXYGEN SATURATION: 99 % | BODY MASS INDEX: 49.5 KG/M2 | HEART RATE: 93 BPM | DIASTOLIC BLOOD PRESSURE: 80 MMHG

## 2024-05-03 DIAGNOSIS — R80.9 MICROALBUMINURIA: ICD-10-CM

## 2024-05-03 DIAGNOSIS — L40.50 PSORIATIC ARTHRITIS (HCC): ICD-10-CM

## 2024-05-03 DIAGNOSIS — J30.2 SEASONAL ALLERGIC RHINITIS DUE TO FUNGAL SPORES: ICD-10-CM

## 2024-05-03 DIAGNOSIS — E11.65 CONTROLLED TYPE 2 DIABETES MELLITUS WITH HYPERGLYCEMIA, WITHOUT LONG-TERM CURRENT USE OF INSULIN (HCC): ICD-10-CM

## 2024-05-03 DIAGNOSIS — I10 HYPERTENSION, UNSPECIFIED TYPE: Primary | ICD-10-CM

## 2024-05-03 PROBLEM — F41.9 ANXIETY: Status: RESOLVED | Noted: 2024-03-04 | Resolved: 2024-05-03

## 2024-05-03 PROCEDURE — 3075F SYST BP GE 130 - 139MM HG: CPT | Performed by: NURSE PRACTITIONER

## 2024-05-03 PROCEDURE — 2022F DILAT RTA XM EVC RTNOPTHY: CPT | Performed by: NURSE PRACTITIONER

## 2024-05-03 PROCEDURE — G8417 CALC BMI ABV UP PARAM F/U: HCPCS | Performed by: NURSE PRACTITIONER

## 2024-05-03 PROCEDURE — 1036F TOBACCO NON-USER: CPT | Performed by: NURSE PRACTITIONER

## 2024-05-03 PROCEDURE — 99214 OFFICE O/P EST MOD 30 MIN: CPT | Performed by: NURSE PRACTITIONER

## 2024-05-03 PROCEDURE — 3079F DIAST BP 80-89 MM HG: CPT | Performed by: NURSE PRACTITIONER

## 2024-05-03 PROCEDURE — G8427 DOCREV CUR MEDS BY ELIG CLIN: HCPCS | Performed by: NURSE PRACTITIONER

## 2024-05-03 PROCEDURE — 3051F HG A1C>EQUAL 7.0%<8.0%: CPT | Performed by: NURSE PRACTITIONER

## 2024-05-03 NOTE — PATIENT INSTRUCTIONS
Diabetes Blood Sugar Emergencies: Your Action Plan  How can you prevent a blood sugar emergency?  An important part of living with diabetes is keeping your blood sugar in your target range. You'll need to know what to do if it's too high or too low. Managing your blood sugar levels helps you avoid emergencies. This care sheet will teach you about the signs of high and low blood sugar. It will help you make an action plan with your doctor for when these signs occur.  Low blood sugar is more likely to happen if you take certain medicines for diabetes. It can also happen if you skip a meal, drink alcohol, or exercise more than usual.  You may get high blood sugar if you eat differently than you normally do. One example is eating more carbohydrate than usual. Having a cold, the flu, or other sudden illness can also cause high blood sugar levels. Levels can also rise if you miss a dose of medicine.  Any change in how you take your medicine may affect your blood sugar level. So it's important to work with your doctor before you make any changes.  Track your blood sugar  Work with your doctor to fill in the blank spaces below that apply to you.   Track your levels, know your target range, and write down ways you can get your blood sugar back in your target range. A log book can help you track your levels. Take the book to all of your medical appointments.  Check your blood sugar _____ times a day, at these times:________________________________________________.  (For example: Before meals, at bedtime, before exercise, during exercise, other.)  Your blood sugar target range before a meal is ___________________. Your blood sugar target range after a meal is _______________________.  Do this--___________________________________________________--to get your blood sugar back within your safe range if your blood sugar results are _________________________________________. (For example: Less than 70 or above 250

## 2024-05-03 NOTE — PROGRESS NOTES
[address] [place] [id number]  Dept: [phone]          Isamar Smith (:  1975) is a 48 y.o. female,Established patient, here for evaluation of the following chief complaint(s):  Follow-up      ASSESSMENT/PLAN:  I have reviewed the patient's medical history in detail and updated the computerized patient record.  HPI/ROS per the patient and caregiver.   Overall non toxic in appearance. Answers questions appropriately.   Conditions discussed and addressed this visit include:   The 10-year ASCVD risk score (Mayra MAI, et al., 2019) is: 5.1%    Values used to calculate the score:      Age: 48 years      Sex: Female      Is Non- : No      Diabetic: Yes      Tobacco smoker: No      Systolic Blood Pressure: 138 mmHg      Is BP treated: Yes      HDL Cholesterol: 31 mg/dL      Total Cholesterol: 163 mg/dL  Labs reviewed  Overall chronic conditions stable  Edema improved  Seeing ent soon.   The patient is advised to begin progressive daily aerobic exercise program, follow a low fat, low cholesterol diet, attempt to lose weight, reduce salt in diet and cooking, reduce exposure to stress, improve dietary compliance, use calcium 1 gram daily with Vit D, continue current medications, and continue current healthy lifestyle patterns.    1. Hypertension, unspecified type  2. Seasonal allergic rhinitis due to fungal spores  3. Controlled type 2 diabetes mellitus with hyperglycemia, without long-term current use of insulin (HCC)  4. Psoriatic arthritis (HCC)  5. Microalbuminuria      Return in about 6 months (around 11/3/2024) for Hypertension follow up, Medication refill, Results review.    SUBJECTIVE/OBJECTIVE:  HPI  Here for follow up  Back to school  Some days fatigue is still overwhelming  Skin better  Some URI sx  Cough and breathlessness somewhat improved  Pft completed      Review of Systems   Constitutional:  Positive for fatigue. Negative for activity change, appetite

## 2024-05-06 ASSESSMENT — ENCOUNTER SYMPTOMS: CHEST TIGHTNESS: 0

## 2024-06-04 ENCOUNTER — NURSE ONLY (OUTPATIENT)
Dept: LAB | Age: 49
End: 2024-06-04

## 2024-06-04 DIAGNOSIS — E11.21 DIABETIC NEPHROPATHY WITH PROTEINURIA (HCC): ICD-10-CM

## 2024-06-04 DIAGNOSIS — N18.2 CKD (CHRONIC KIDNEY DISEASE), STAGE II: ICD-10-CM

## 2024-06-04 DIAGNOSIS — I10 ESSENTIAL HYPERTENSION: ICD-10-CM

## 2024-06-04 DIAGNOSIS — R80.0 ISOLATED NON-NEPHROTIC PROTEINURIA: ICD-10-CM

## 2024-06-04 LAB
ALBUMIN SERPL BCG-MCNC: 3.8 G/DL (ref 3.5–5.1)
ALP SERPL-CCNC: 74 U/L (ref 38–126)
ALT SERPL W/O P-5'-P-CCNC: 16 U/L (ref 11–66)
ANION GAP SERPL CALC-SCNC: 13 MEQ/L (ref 8–16)
AST SERPL-CCNC: 16 U/L (ref 5–40)
BACTERIA: ABNORMAL
BASOPHILS ABSOLUTE: 0 THOU/MM3 (ref 0–0.1)
BASOPHILS NFR BLD AUTO: 0.4 %
BILIRUB SERPL-MCNC: 0.3 MG/DL (ref 0.3–1.2)
BILIRUB UR QL STRIP: NEGATIVE
BUN SERPL-MCNC: 13 MG/DL (ref 7–22)
CALCIUM SERPL-MCNC: 9.2 MG/DL (ref 8.5–10.5)
CASTS #/AREA URNS LPF: ABNORMAL /LPF
CASTS #/AREA URNS LPF: ABNORMAL /LPF
CHARACTER UR: CLEAR
CHARCOAL URNS QL MICRO: ABNORMAL
CHLORIDE SERPL-SCNC: 99 MEQ/L (ref 98–111)
CO2 SERPL-SCNC: 25 MEQ/L (ref 23–33)
COLOR UR: YELLOW
CREAT SERPL-MCNC: 0.5 MG/DL (ref 0.4–1.2)
CREAT UR-MCNC: 154.2 MG/DL
CREAT UR-MCNC: 154.2 MG/DL
CRYSTALS URNS QL MICRO: ABNORMAL
DEPRECATED RDW RBC AUTO: 44.9 FL (ref 35–45)
EOSINOPHIL NFR BLD AUTO: 1.7 %
EOSINOPHILS ABSOLUTE: 0.2 THOU/MM3 (ref 0–0.4)
EPITHELIAL CELLS, UA: ABNORMAL /HPF
ERYTHROCYTE [DISTWIDTH] IN BLOOD BY AUTOMATED COUNT: 14.5 % (ref 11.5–14.5)
ERYTHROCYTE [SEDIMENTATION RATE] IN BLOOD BY WESTERGREN METHOD: 17 MM/HR (ref 0–20)
GFR SERPL CREATININE-BSD FRML MDRD: > 90 ML/MIN/1.73M2
GLUCOSE SERPL-MCNC: 162 MG/DL (ref 70–108)
GLUCOSE UR QL STRIP.AUTO: NEGATIVE MG/DL
HCT VFR BLD AUTO: 38.1 % (ref 37–47)
HGB BLD-MCNC: 12.2 GM/DL (ref 12–16)
HGB UR QL STRIP.AUTO: NEGATIVE
IMM GRANULOCYTES # BLD AUTO: 0.04 THOU/MM3 (ref 0–0.07)
IMM GRANULOCYTES NFR BLD AUTO: 0.4 %
KETONES UR QL STRIP.AUTO: NEGATIVE
LEUKOCYTE ESTERASE UR QL STRIP.AUTO: NEGATIVE
LYMPHOCYTES ABSOLUTE: 2.6 THOU/MM3 (ref 1–4.8)
LYMPHOCYTES NFR BLD AUTO: 28.1 %
MCH RBC QN AUTO: 27.7 PG (ref 26–33)
MCHC RBC AUTO-ENTMCNC: 32 GM/DL (ref 32.2–35.5)
MCV RBC AUTO: 86.4 FL (ref 81–99)
MICROALBUMIN UR-MCNC: 16.09 MG/DL
MICROALBUMIN/CREAT RATIO PNL UR: 104 MG/G (ref 0–30)
MONOCYTES ABSOLUTE: 0.5 THOU/MM3 (ref 0.4–1.3)
MONOCYTES NFR BLD AUTO: 5.6 %
NEUTROPHILS ABSOLUTE: 5.9 THOU/MM3 (ref 1.8–7.7)
NEUTROPHILS NFR BLD AUTO: 63.8 %
NITRITE UR QL STRIP.AUTO: NEGATIVE
NRBC BLD AUTO-RTO: 0 /100 WBC
PH UR STRIP.AUTO: 5.5 [PH] (ref 5–9)
PLATELET # BLD AUTO: 371 THOU/MM3 (ref 130–400)
PMV BLD AUTO: 10.2 FL (ref 9.4–12.4)
POTASSIUM SERPL-SCNC: 3.8 MEQ/L (ref 3.5–5.2)
PROT SERPL-MCNC: 6.8 G/DL (ref 6.1–8)
PROT UR STRIP.AUTO-MCNC: 30 MG/DL
PROT UR-MCNC: 32.9 MG/DL
PROT/CREAT 24H UR: 0.21 MG/G{CREAT}
RBC # BLD AUTO: 4.41 MILL/MM3 (ref 4.2–5.4)
RBC #/AREA URNS HPF: ABNORMAL /HPF
RENAL EPI CELLS #/AREA URNS HPF: ABNORMAL /[HPF]
SODIUM SERPL-SCNC: 137 MEQ/L (ref 135–145)
SPECIFIC GRAVITY UA: 1.03 (ref 1–1.03)
UROBILINOGEN, URINE: 0.2 EU/DL (ref 0–1)
WBC # BLD AUTO: 9.3 THOU/MM3 (ref 4.8–10.8)
WBC #/AREA URNS HPF: ABNORMAL /HPF
YEAST LIKE FUNGI URNS QL MICRO: ABNORMAL

## 2024-06-10 ENCOUNTER — OFFICE VISIT (OUTPATIENT)
Dept: NEPHROLOGY | Age: 49
End: 2024-06-10
Payer: COMMERCIAL

## 2024-06-10 VITALS
WEIGHT: 293 LBS | BODY MASS INDEX: 50.77 KG/M2 | OXYGEN SATURATION: 98 % | HEART RATE: 63 BPM | DIASTOLIC BLOOD PRESSURE: 66 MMHG | SYSTOLIC BLOOD PRESSURE: 158 MMHG

## 2024-06-10 DIAGNOSIS — N18.2 CKD (CHRONIC KIDNEY DISEASE), STAGE II: Primary | ICD-10-CM

## 2024-06-10 DIAGNOSIS — E11.21 DIABETIC NEPHROPATHY WITH PROTEINURIA (HCC): ICD-10-CM

## 2024-06-10 DIAGNOSIS — R80.0 ISOLATED NON-NEPHROTIC PROTEINURIA: ICD-10-CM

## 2024-06-10 DIAGNOSIS — I10 ESSENTIAL HYPERTENSION: ICD-10-CM

## 2024-06-10 PROCEDURE — 3078F DIAST BP <80 MM HG: CPT | Performed by: INTERNAL MEDICINE

## 2024-06-10 PROCEDURE — G8417 CALC BMI ABV UP PARAM F/U: HCPCS | Performed by: INTERNAL MEDICINE

## 2024-06-10 PROCEDURE — 3077F SYST BP >= 140 MM HG: CPT | Performed by: INTERNAL MEDICINE

## 2024-06-10 PROCEDURE — G8427 DOCREV CUR MEDS BY ELIG CLIN: HCPCS | Performed by: INTERNAL MEDICINE

## 2024-06-10 PROCEDURE — 2022F DILAT RTA XM EVC RTNOPTHY: CPT | Performed by: INTERNAL MEDICINE

## 2024-06-10 PROCEDURE — 3051F HG A1C>EQUAL 7.0%<8.0%: CPT | Performed by: INTERNAL MEDICINE

## 2024-06-10 PROCEDURE — 1036F TOBACCO NON-USER: CPT | Performed by: INTERNAL MEDICINE

## 2024-06-10 PROCEDURE — 99213 OFFICE O/P EST LOW 20 MIN: CPT | Performed by: INTERNAL MEDICINE

## 2024-06-10 NOTE — PROGRESS NOTES
[facility]        Outpatient Follow-Up note         6/10/2024 2:04 PM    Patient Name:   Isamar Smith  YOB: 1975  Primary Care Physician:  Gina Howell, APRN - CNP   [facility] [place] [facility]  [facility]  [address]  [address]  [facility] [address]  Dept: [phone]  Loc: [phone]     Chief Complaint / Reason for follow-up : Follow Up of CKD/microalbuminuria     Interval History :  Patient seen and examined by me.   Feels ok. No cp or SOB.  On bumex   Lisinopril d/c  Losartan started  Protonix started      Past History :  Past Medical History:   Diagnosis Date    Arthritis     Hypertension     Mild depression     PCOS (polycystic ovarian syndrome)      Past Surgical History:   Procedure Laterality Date    COLONOSCOPY N/A 8/14/2023    COLONOSCOPY POLYPECTOMY SNARE/COLD BIOPSY performed by Alexis Rivero MD at [facility] ENDOSCOPY    DILATION AND CURETTAGE OF UTERUS      WISDOM TOOTH EXTRACTION          Medications :     Outpatient Medications Marked as Taking for the 6/10/24 encounter (Office Visit) with Jb Prince MD   Medication Sig Dispense Refill    SITagliptin (JANUVIA) 100 MG tablet Take 1 tablet by mouth once daily 90 tablet 1    bumetanide (BUMEX) 1 MG tablet Take 1 tablet by mouth 2 times daily 60 tablet 5    potassium chloride (KLOR-CON M) 20 MEQ extended release tablet Take 1 tablet by mouth daily 90 tablet 1    albuterol sulfate HFA (PROVENTIL;VENTOLIN;PROAIR) 108 (90 Base) MCG/ACT inhaler INHALE 2 PUFFS BY MOUTH 4 TIMES DAILY AS NEEDED FOR WHEEZING 18 g 5    famotidine (PEPCID) 40 MG tablet Take 1 tablet by mouth nightly 90 tablet 3    levocetirizine (XYZAL) 5 MG tablet Take 1 tablet by mouth nightly 90 tablet 3    losartan-hydroCHLOROthiazide (HYZAAR) 50-12.5 MG per tablet Take 1 tablet by mouth daily 90 tablet 3    metFORMIN (GLUCOPHAGE) 500 MG tablet Take 1 tablet by mouth with

## 2024-06-10 NOTE — PROGRESS NOTES
Bumex Prescribed twice a day but only once a day  Lisinopril d/c  Losartan started  Protonix started

## 2024-06-24 ENCOUNTER — OFFICE VISIT (OUTPATIENT)
Dept: ENT CLINIC | Age: 49
End: 2024-06-24
Payer: COMMERCIAL

## 2024-06-24 VITALS
HEIGHT: 68 IN | WEIGHT: 293 LBS | OXYGEN SATURATION: 98 % | BODY MASS INDEX: 44.41 KG/M2 | DIASTOLIC BLOOD PRESSURE: 72 MMHG | HEART RATE: 72 BPM | RESPIRATION RATE: 22 BRPM | SYSTOLIC BLOOD PRESSURE: 136 MMHG | TEMPERATURE: 98.2 F

## 2024-06-24 DIAGNOSIS — J30.9 ALLERGIC RHINITIS, UNSPECIFIED SEASONALITY, UNSPECIFIED TRIGGER: Primary | ICD-10-CM

## 2024-06-24 PROCEDURE — G8417 CALC BMI ABV UP PARAM F/U: HCPCS | Performed by: NURSE PRACTITIONER

## 2024-06-24 PROCEDURE — 3075F SYST BP GE 130 - 139MM HG: CPT | Performed by: NURSE PRACTITIONER

## 2024-06-24 PROCEDURE — G8427 DOCREV CUR MEDS BY ELIG CLIN: HCPCS | Performed by: NURSE PRACTITIONER

## 2024-06-24 PROCEDURE — 1036F TOBACCO NON-USER: CPT | Performed by: NURSE PRACTITIONER

## 2024-06-24 PROCEDURE — 99203 OFFICE O/P NEW LOW 30 MIN: CPT | Performed by: NURSE PRACTITIONER

## 2024-06-24 PROCEDURE — 3078F DIAST BP <80 MM HG: CPT | Performed by: NURSE PRACTITIONER

## 2024-06-24 RX ORDER — MONTELUKAST SODIUM 10 MG/1
10 TABLET ORAL DAILY
Qty: 30 TABLET | Refills: 3 | Status: SHIPPED | OUTPATIENT
Start: 2024-06-24

## 2024-06-24 NOTE — PROGRESS NOTES
[facility] [place] [facility]  [facility] EAR, NOSE AND THROAT  [address]   [address]  [facility] [address]  Dept: [phone]  Dept Fax: [phone]  Loc: [phone]    HPI:     Isamar Smith is a 48 y.o. female new patient here for evaluation of ear issues.  Patient was referred by Kirsten Howell*; notes reviewed.  Patient seen with PCP 4/1/24 found to have right serous effusion.       Has seasonal allergies.  Takes Xyzal and Astepro, also taking a Claritin.  Does not see an allergist.  Does not use rinses regularly.  Nasal congestion and post nasal drainage. No imaging of head/sinuses on file.  She had recent PFTs due to shortness of breath and frequent bronchitis that were wnl. History of rheumatoid arthritis.    Follows with Nephrology for CKD stage I likely secondary to obesity/diabetes with proteinuria.     History:     Allergies   Allergen Reactions    Latex     Doxycycline      Gland in back of neck was golf ball size when she took this.    Amoxicillin Rash    Ciprofloxacin Nausea And Vomiting     Current Outpatient Medications   Medication Sig Dispense Refill    Lutein-Zeaxanthin 15-4.75 MG CAPS Take by mouth      montelukast (SINGULAIR) 10 MG tablet Take 1 tablet by mouth daily 30 tablet 3    SITagliptin (JANUVIA) 100 MG tablet Take 1 tablet by mouth once daily 90 tablet 1    bumetanide (BUMEX) 1 MG tablet Take 1 tablet by mouth 2 times daily 60 tablet 5    potassium chloride (KLOR-CON M) 20 MEQ extended release tablet Take 1 tablet by mouth daily 90 tablet 1    albuterol sulfate HFA (PROVENTIL;VENTOLIN;PROAIR) 108 (90 Base) MCG/ACT inhaler INHALE 2 PUFFS BY MOUTH 4 TIMES DAILY AS NEEDED FOR WHEEZING 18 g 5    famotidine (PEPCID) 40 MG tablet Take 1 tablet by mouth nightly 90 tablet 3    levocetirizine (XYZAL) 5 MG tablet Take 1 tablet by mouth nightly 90 tablet 3    losartan-hydroCHLOROthiazide (HYZAAR) 50-12.5 MG per tablet Take 1 tablet by mouth daily 90 tablet 3

## 2024-06-26 ENCOUNTER — HOSPITAL ENCOUNTER (OUTPATIENT)
Dept: GENERAL RADIOLOGY | Age: 49
Discharge: HOME | End: 2024-06-26
Payer: COMMERCIAL

## 2024-06-26 ENCOUNTER — HOSPITAL ENCOUNTER (OUTPATIENT)
Age: 49
Discharge: HOME | End: 2024-06-26
Payer: COMMERCIAL

## 2024-06-26 DIAGNOSIS — M54.50 LUMBAR PAIN: ICD-10-CM

## 2024-06-26 DIAGNOSIS — L40.50 PSORIATIC ARTHROPATHY (HCC): ICD-10-CM

## 2024-06-26 PROCEDURE — 72100 X-RAY EXAM L-S SPINE 2/3 VWS: CPT

## 2024-07-08 ENCOUNTER — HOSPITAL ENCOUNTER (OUTPATIENT)
Dept: PHYSICAL THERAPY | Age: 49
Setting detail: THERAPIES SERIES
Discharge: HOME | End: 2024-07-08
Payer: COMMERCIAL

## 2024-07-08 PROCEDURE — 97161 PT EVAL LOW COMPLEX 20 MIN: CPT

## 2024-07-08 PROCEDURE — 97110 THERAPEUTIC EXERCISES: CPT

## 2024-07-12 ENCOUNTER — HOSPITAL ENCOUNTER (OUTPATIENT)
Dept: PHYSICAL THERAPY | Age: 49
Setting detail: THERAPIES SERIES
Discharge: HOME | End: 2024-07-12
Payer: COMMERCIAL

## 2024-07-12 PROCEDURE — 97110 THERAPEUTIC EXERCISES: CPT

## 2024-07-12 NOTE — PROGRESS NOTES
weeks   Isamar will be discharged from PT with independent HEP to maintain all gains achieved in clinic.  Isamar's Modified NOEMÍ will improve to <2 indicating minimal to no disability related to her back pain.  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      Patient Education:   [x]  HEP/Education Completed: Plan of Care, Goals, HEP  Medbridge Access Code:  []  No new Education completed  []  Reviewed Prior HEP      [x]  Patient verbalized and/or demonstrated understanding of education provided.  []  Patient unable to verbalize and/or demonstrate understanding of education provided.  Will continue education.  [x]  Barriers to learning: n/a    PLAN:  Treatment Recommendations: Strengthening, Range of Motion, Endurance Training, Gait Training, Neuromuscular Re-education, Manual Therapy - Soft Tissue Mobilization, Manual Therapy - Joint Manipulation, Pain Management, Home Exercise Program, Patient Education, Integrative Dry Needling, Aquatics, and Modalities    []  Plan of care initiated.  Plan to see patient 2 times per week for 8 weeks to address the treatment planned outlined above.  [x]  Continue with current plan of care  []  Modify plan of care as follows:    []  Hold pending physician visit  []  Discharge    Time In 1600   Time Out 1630   Timed Code Minutes: 30 min   Total Treatment Time: 30 min       Electronically Signed by: Selene Bonner PTA

## 2024-07-18 ENCOUNTER — HOSPITAL ENCOUNTER (OUTPATIENT)
Dept: PHYSICAL THERAPY | Age: 49
Setting detail: THERAPIES SERIES
Discharge: HOME | End: 2024-07-18
Payer: COMMERCIAL

## 2024-07-18 PROCEDURE — 97110 THERAPEUTIC EXERCISES: CPT

## 2024-07-18 NOTE — PROGRESS NOTES
[facility]  PHYSICAL THERAPY  [] EVALUATION  [x] DAILY NOTE (LAND) [] DAILY NOTE (AQUATIC ) [] PROGRESS NOTE [] DISCHARGE NOTE    [] OUTPATIENT REHABILITATION CENTER [facility]   [] [place] [facility]    [] [facility]   [x] JASMIN [facility]    Date: 2024  Patient Name:  Isamar Smith  : 1975  MRN: [id number]  CSN: [id number]    Referring Practitioner Gina Howell APRN - CNP    Diagnosis Arthropathic psoriasis, unspecified  Morbid (severe) obesity due to excess calories  Low back pain, unspecified  Other symptoms and signs involving the musculoskeletal system   Treatment Diagnosis M54.31  Sciatica, Right Side  M54.32  Sciatica, Left Side  R53.1 Weakness   Date of Evaluation 24    Additional Pertinent History HTN; Pre-DM; anxiety; obesity; OA; SOB      Functional Outcome Measure Used Modified NOEMÍ   Functional Outcome Score 15/50 or 30% disability (24)       Insurance: Primary: Payor: [employer] /  /  / ,   Secondary:    Authorization Information: Pre-certification is not needed    Approved Procedure Codes: Authorization of Specific CPT Codes Not Required  (Codes requested indicated by red font, codes approved indicated by black font)   Visit # 3, 3/10 for progress note (Reporting Period:  to     )   Visits Allowed: Based on medical necessity   Recertification Date:    Physician Follow-Up:    Physician Orders:    History of Present Illness: Isamar is referred to address low back pain that has intensified over the last few months with the sciatic pain being most notable. The sciatic pain shoots down either leg, currently left side is effected the most.     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 She does not remember the pain being intensified since the fall.     She denies numbness and describes it more like a burning pain.     SUBJECTIVE: Isamar

## 2024-07-22 ENCOUNTER — HOSPITAL ENCOUNTER (OUTPATIENT)
Dept: PHYSICAL THERAPY | Age: 49
Setting detail: THERAPIES SERIES
End: 2024-07-22
Payer: COMMERCIAL

## 2024-07-22 NOTE — TELEPHONE ENCOUNTER
Orders placed and mailed to patient.  
Patient is requesting a new lab order, because she has rescheduled her appt to 6-10-24 due to work schedule.  
Worsening.

## 2024-07-26 ENCOUNTER — HOSPITAL ENCOUNTER (OUTPATIENT)
Dept: PHYSICAL THERAPY | Age: 49
Setting detail: THERAPIES SERIES
Discharge: HOME | End: 2024-07-26
Payer: COMMERCIAL

## 2024-07-26 PROCEDURE — 97110 THERAPEUTIC EXERCISES: CPT

## 2024-07-26 NOTE — PROGRESS NOTES
[facility]      Isamar BOUDREAUX Luis         [id number]  7/26/2024   No chief complaint on file.       Pt of Genny GARCIA Download:   Original or initial AHI: 23     Date of initial study: 3/13/10      Compliant  100%     Noncompliant 0 %     PAP Type CPAP Level  14   Avg Hrs/Day 7 hours 2 minutes  AHI: 2.7   Leaks : 95 th percentile: 23.2   Recorded compliance dates , 6/24/24  to 7/23/24   Machine/Mfg:   [x] ResMed    [] Respironics/Dreamstation   Interface:   [] Nasal    [] Nasal pillows   [x] FFM      Provider:      [x] SR-HME     []Yumiko     [] Dasco    [] [employer]    [] [facility]ans               [] [employer]      [] Adaptive    [] [place]:      [] Other    Neck Size: 19  Mallampati 2  ESS:  {Numbers;0-30:20733}  SAQLI:     Here is a scan of the most recent download:              Presentation:   Isamar presents for {Time; 2 to 12 months:00496}sleep medicine follow up for {Sleep apnea diagnosis:49628}  Since the last visit, Isamar ***      Progress History:   Since last visit any new medical issues? {YES/NO:19726}  Any trouble with Machine {YES/NO:19726}  Any new sleep medicines? {YES/NO:19670}  Trouble Falling Asleep {yes/no how often:34098}  Trouble Staying Asleep {YES/NO, FREQUENCY:20019}  Snoring {YES/NO:19670}    Equipment issues:  The pressure {IS/IS NOT:95830}  acceptable, the mask is {Desc; acceptable/unacceptable:61522}     Review of Systems -   Review of Systems     Physical Exam:    BMI:  There is no height or weight on file to calculate BMI.    Wt Readings from Last 3 Encounters:   06/24/24 (!) 151.8 kg (334 lb 11.2 oz)   06/10/24 (!) 149.2 kg (329 lb)   05/03/24 (!) 145.5 kg (320 lb 12.8 oz)     Weight {WEIGHT LOSS/GAIN 2:19682}  Vitals: There were no vitals taken for this visit.      Physical Exam      ASSESSMENT/DIAGNOSIS    {No diagnosis found. (Refresh or delete this SmartLink)}         Plan   There are no diagnoses linked to this

## 2024-07-26 NOTE — PROGRESS NOTES
[facility]  PHYSICAL THERAPY  [] EVALUATION  [x] DAILY NOTE (LAND) [] DAILY NOTE (AQUATIC ) [] PROGRESS NOTE [] DISCHARGE NOTE    [] OUTPATIENT REHABILITATION CENTER [facility]   [] [place] [facility]    [] [facility]   [x] JASMIN [facility]    Date: 2024  Patient Name:  Isamar Smith  : 1975  MRN: [id number]  CSN: [id number]    Referring Practitioner Gina Howell APRN - CNP    Diagnosis Arthropathic psoriasis, unspecified  Morbid (severe) obesity due to excess calories  Low back pain, unspecified  Other symptoms and signs involving the musculoskeletal system   Treatment Diagnosis M54.31  Sciatica, Right Side  M54.32  Sciatica, Left Side  R53.1 Weakness   Date of Evaluation 24    Additional Pertinent History HTN; Pre-DM; anxiety; obesity; OA; SOB      Functional Outcome Measure Used Modified NOEMÍ   Functional Outcome Score 15/50 or 30% disability (24)       Insurance: Primary: Payor: [employer] /  /  / ,   Secondary:    Authorization Information: Pre-certification is not needed    Approved Procedure Codes: Authorization of Specific CPT Codes Not Required  (Codes requested indicated by red font, codes approved indicated by black font)   Visit # 4, /10 for progress note (Reporting Period:  to     )   Visits Allowed: Based on medical necessity   Recertification Date:    Physician Follow-Up:    Physician Orders:    History of Present Illness: Isamar is referred to address low back pain that has intensified over the last few months with the sciatic pain being most notable. The sciatic pain shoots down either leg, currently left side is effected the most.     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 She does not remember the pain being intensified since the fall.     She denies numbness and describes it more like a burning pain.     SUBJECTIVE: Isamar

## 2024-07-29 ENCOUNTER — OFFICE VISIT (OUTPATIENT)
Dept: PULMONOLOGY | Age: 49
End: 2024-07-29
Payer: COMMERCIAL

## 2024-07-29 VITALS
OXYGEN SATURATION: 97 % | TEMPERATURE: 98.1 F | WEIGHT: 293 LBS | SYSTOLIC BLOOD PRESSURE: 116 MMHG | BODY MASS INDEX: 44.41 KG/M2 | DIASTOLIC BLOOD PRESSURE: 70 MMHG | HEIGHT: 68 IN | HEART RATE: 67 BPM

## 2024-07-29 DIAGNOSIS — G47.33 OBSTRUCTIVE SLEEP APNEA ON CPAP: Primary | ICD-10-CM

## 2024-07-29 DIAGNOSIS — E66.01 MORBID OBESITY WITH BMI OF 50.0-59.9, ADULT (HCC): ICD-10-CM

## 2024-07-29 PROCEDURE — 1036F TOBACCO NON-USER: CPT | Performed by: NURSE PRACTITIONER

## 2024-07-29 PROCEDURE — G8427 DOCREV CUR MEDS BY ELIG CLIN: HCPCS | Performed by: NURSE PRACTITIONER

## 2024-07-29 PROCEDURE — G8417 CALC BMI ABV UP PARAM F/U: HCPCS | Performed by: NURSE PRACTITIONER

## 2024-07-29 PROCEDURE — 3078F DIAST BP <80 MM HG: CPT | Performed by: NURSE PRACTITIONER

## 2024-07-29 PROCEDURE — 3074F SYST BP LT 130 MM HG: CPT | Performed by: NURSE PRACTITIONER

## 2024-07-29 PROCEDURE — 99214 OFFICE O/P EST MOD 30 MIN: CPT | Performed by: NURSE PRACTITIONER

## 2024-07-29 ASSESSMENT — ENCOUNTER SYMPTOMS
RESPIRATORY NEGATIVE: 1
EYES NEGATIVE: 1
GASTROINTESTINAL NEGATIVE: 1
COUGH: 0
SHORTNESS OF BREATH: 0
ALLERGIC/IMMUNOLOGIC NEGATIVE: 1
WHEEZING: 0

## 2024-07-29 NOTE — PROGRESS NOTES
[place] Pul[facility], Critical Care and Sleep Medicine      Isamar BOUDREAUX Luis         [id number]  7/29/2024   Chief Complaint   Patient presents with   • Follow-up     VICKY 1 year follow up with download SRHME        Pt of Genny Castillo     PAP Download:   Original or initial AHI: 23     Date of initial study: 03/13/10      Compliant  100%         PAP Type CPAP   Level  14   Avg Hrs/Day 7 hours 2 minutes   AHI: 2.7   Recorded compliance dates , 06/24/24  to 07/23/24   Machine/Mfg:   [x] ResMed    [] Respironics/Dreamstation   Interface:   [] Nasal    [] Nasal pillows   [] FFM      Provider:      [] SR-HME     []Apria     [] Dasco    [] [employer]    [] [employer]               [] [employer]      [] Adaptive    [] [place]:      [] Other    Neck Size: 19  Mallampati Mallampati 2  ESS:  6  SAQLI: 87    Here is a scan of the most recent download:            Presentation:   Isamar presents for sleep medicine follow up for obstructive sleep apnea  Since the last visit, Isamar has been compliant with her PAP therapy and continues to see benefit from use  Awake and alert today   AHI is well controlled   MO BMI 50  No sleep medication use at night     Equipment issues:  The pressure is  acceptable, the mask is acceptable     Sleep issues:  Do you feel better? Yes  More rested?Yes   Better concentration? yes    Progress History:   Since last visit any new medical issues? No  New ER or hospital visits? No      Review of Systems -   Review of Systems   Constitutional: Negative.  Negative for chills and fever.   HENT: Negative.  Negative for congestion.    Eyes: Negative.    Respiratory: Negative.  Negative for cough, shortness of breath and wheezing.    Cardiovascular: Negative.  Negative for chest pain and leg swelling.   Gastrointestinal: Negative.    Endocrine: Negative.    Genitourinary: Negative.    Musculoskeletal: Negative.    Allergic/Immunologic: Negative.    Neurological: Negative.    Hematological: Negative.

## 2024-08-05 ENCOUNTER — HOSPITAL ENCOUNTER (OUTPATIENT)
Dept: PHYSICAL THERAPY | Age: 49
Setting detail: THERAPIES SERIES
Discharge: HOME | End: 2024-08-05
Payer: COMMERCIAL

## 2024-08-05 PROCEDURE — 97110 THERAPEUTIC EXERCISES: CPT

## 2024-08-05 NOTE — PROGRESS NOTES
[facility]  PHYSICAL THERAPY  [] EVALUATION  [x] DAILY NOTE (LAND) [] DAILY NOTE (AQUATIC ) [] PROGRESS NOTE [] DISCHARGE NOTE    [] OUTPATIENT REHABILITATION CENTER [facility]   [] [place] [facility]    [] [facility]   [x] JASMIN [facility]    Date: 2024  Patient Name:  Isamar Smith  : 1975  MRN: [id number]  CSN: [id number]    Referring Practitioner Gina Howell APRN - CNP    Diagnosis Arthropathic psoriasis, unspecified  Morbid (severe) obesity due to excess calories  Low back pain, unspecified  Other symptoms and signs involving the musculoskeletal system   Treatment Diagnosis M54.31  Sciatica, Right Side  M54.32  Sciatica, Left Side  R53.1 Weakness   Date of Evaluation 24    Additional Pertinent History HTN; Pre-DM; anxiety; obesity; OA; SOB      Functional Outcome Measure Used Modified NOEMÍ   Functional Outcome Score 15/50 or 30% disability (24)       Insurance: Primary: Payor: [employer] /  /  / ,   Secondary:    Authorization Information: Pre-certification is not needed    Approved Procedure Codes: Authorization of Specific CPT Codes Not Required  (Codes requested indicated by red font, codes approved indicated by black font)   Visit # 5, /10 for progress note (Reporting Period:  to     )   Visits Allowed: Based on medical necessity   Recertification Date:    Physician Follow-Up:    Physician Orders:    History of Present Illness: Isamar is referred to address low back pain that has intensified over the last few months with the sciatic pain being most notable. The sciatic pain shoots down either leg, currently left side is effected the most.     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 She does not remember the pain being intensified since the fall.     She denies numbness and describes it more like a burning pain.     SUBJECTIVE: Isamar just

## 2024-08-09 ENCOUNTER — HOSPITAL ENCOUNTER (OUTPATIENT)
Dept: PHYSICAL THERAPY | Age: 49
Setting detail: THERAPIES SERIES
Discharge: HOME | End: 2024-08-09
Payer: COMMERCIAL

## 2024-08-09 PROCEDURE — 97530 THERAPEUTIC ACTIVITIES: CPT

## 2024-08-09 PROCEDURE — 97110 THERAPEUTIC EXERCISES: CPT

## 2024-08-09 NOTE — PROGRESS NOTES
[facility]  PHYSICAL THERAPY  [] EVALUATION  [] DAILY NOTE (LAND) [] DAILY NOTE (AQUATIC ) [x] PROGRESS NOTE [] DISCHARGE NOTE    [] OUTPATIENT REHABILITATION CENTER [facility]   [] [place] [facility]    [] [facility]   [x] JASMIN [facility]    Date: 2024  Patient Name:  Isamar Smith  : 1975  MRN: [id number]  CSN: [id number]    Referring Practitioner Gina Howell APRN - CNP    Diagnosis Arthropathic psoriasis, unspecified  Morbid (severe) obesity due to excess calories  Low back pain, unspecified  Other symptoms and signs involving the musculoskeletal system   Treatment Diagnosis M54.31  Sciatica, Right Side  M54.32  Sciatica, Left Side  R53.1 Weakness   Date of Evaluation 24    Additional Pertinent History HTN; Pre-DM; anxiety; obesity; OA; SOB      Functional Outcome Measure Used Modified NOEMÍ   Functional Outcome Score 15/50 or 30% disability (24)   13/50 or 26% disability (24)      Insurance: Primary: Payor: [employer] /  /  / ,   Secondary:    Authorization Information: Pre-certification is not needed    Approved Procedure Codes: Authorization of Specific CPT Codes Not Required  (Codes requested indicated by red font, codes approved indicated by black font)   Visit # 6, 0/10 for progress note (Reporting Period:  to     )   Visits Allowed: Based on medical necessity   Recertification Date:    Physician Follow-Up:    Physician Orders:    History of Present Illness: Isamar is referred to address low back pain that has intensified over the last few months with the sciatic pain being most notable. The sciatic pain shoots down either leg, currently left side is effected the most.     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 She does not remember the pain being intensified since the fall.     She denies numbness and describes it more like a burning

## 2024-08-14 ENCOUNTER — OFFICE VISIT (OUTPATIENT)
Dept: ENT CLINIC | Age: 49
End: 2024-08-14
Payer: COMMERCIAL

## 2024-08-14 VITALS
WEIGHT: 293 LBS | HEART RATE: 67 BPM | HEIGHT: 68 IN | DIASTOLIC BLOOD PRESSURE: 64 MMHG | RESPIRATION RATE: 20 BRPM | SYSTOLIC BLOOD PRESSURE: 130 MMHG | BODY MASS INDEX: 44.41 KG/M2 | TEMPERATURE: 97.4 F | OXYGEN SATURATION: 98 %

## 2024-08-14 DIAGNOSIS — J30.9 ALLERGIC RHINITIS, UNSPECIFIED SEASONALITY, UNSPECIFIED TRIGGER: Primary | ICD-10-CM

## 2024-08-14 PROCEDURE — 3075F SYST BP GE 130 - 139MM HG: CPT | Performed by: NURSE PRACTITIONER

## 2024-08-14 PROCEDURE — G8427 DOCREV CUR MEDS BY ELIG CLIN: HCPCS | Performed by: NURSE PRACTITIONER

## 2024-08-14 PROCEDURE — 99213 OFFICE O/P EST LOW 20 MIN: CPT | Performed by: NURSE PRACTITIONER

## 2024-08-14 PROCEDURE — G8417 CALC BMI ABV UP PARAM F/U: HCPCS | Performed by: NURSE PRACTITIONER

## 2024-08-14 PROCEDURE — 1036F TOBACCO NON-USER: CPT | Performed by: NURSE PRACTITIONER

## 2024-08-14 PROCEDURE — 3078F DIAST BP <80 MM HG: CPT | Performed by: NURSE PRACTITIONER

## 2024-08-14 RX ORDER — MONTELUKAST SODIUM 10 MG/1
10 TABLET ORAL DAILY
Qty: 30 TABLET | Refills: 11 | Status: SHIPPED | OUTPATIENT
Start: 2024-08-14

## 2024-08-14 NOTE — PROGRESS NOTES
[facility] [place] [facility]  [facility] EAR, NOSE AND THROAT  [address]   [address]  [facility] [address]  Dept: [phone]  Dept Fax: [phone]  Loc: [phone]    HPI:     Isamar Smith is a 48 y.o. female patient here for follow up regarding sinonasal issues.  Patient reports feeling well using the Xyzal and Singulair with aerosol saline.  Initially she did need a few doses of Benadryl, but overall much better and happy with current symptom control.  Her right ear does pop/crackle at times.  She does clench/grind teeth and have some alignment issues.     Last visit:  Isamar Smith is a 48 y.o. female new patient here for evaluation of ear issues.  Patient was referred by Kirsten Howell*; notes reviewed.  Patient seen with PCP 4/1/24 found to have right serous effusion.                   Has seasonal allergies.  Takes Xyzal and Astepro, also taking a Claritin.  Does not see an allergist.  Does not use rinses regularly.  Nasal congestion and post nasal drainage. No imaging of head/sinuses on file.  She had recent PFTs due to shortness of breath and frequent bronchitis that were wnl. History of rheumatoid arthritis.     Follows with Nephrology for CKD stage I likely secondary to obesity/diabetes with proteinuria.     Plan: Need to get rid of triple antihistamines. Okay to use the Astepro with Xyzal. Add rinses and Singulair. Will touch base in a few weeks. If no improvement, consider CT sinus and/or allergy referral.     History:     Allergies   Allergen Reactions    Latex     Doxycycline      Gland in back of neck was golf ball size when she took this.    Amoxicillin Rash    Ciprofloxacin Nausea And Vomiting     Current Outpatient Medications   Medication Sig Dispense Refill    montelukast (SINGULAIR) 10 MG tablet Take 1 tablet by mouth daily 30 tablet 11    Lutein-Zeaxanthin 15-4.75 MG CAPS Take by mouth      SITagliptin (JANUVIA) 100 MG tablet Take 1 tablet by mouth once

## 2024-08-29 ENCOUNTER — HOSPITAL ENCOUNTER (OUTPATIENT)
Dept: PHYSICAL THERAPY | Age: 49
Setting detail: THERAPIES SERIES
Discharge: HOME | End: 2024-08-29
Payer: COMMERCIAL

## 2024-08-29 PROCEDURE — 97110 THERAPEUTIC EXERCISES: CPT

## 2024-08-29 NOTE — PROGRESS NOTES
[facility]  PHYSICAL THERAPY  [] EVALUATION  [] DAILY NOTE (LAND) [] DAILY NOTE (AQUATIC ) [x] PROGRESS NOTE [] DISCHARGE NOTE    [] OUTPATIENT REHABILITATION CENTER [facility]   [] [facility]    [] [facility]   [x] [facility]    Date: 2024  Patient Name:  Isamar Smith  : 1975  MRN: [id number]  CSN: [id number]    Referring Practitioner Gina Howell, THU - CNP    Diagnosis Arthropathic psoriasis, unspecified  Morbid (severe) obesity due to excess calories  Low back pain, unspecified  Other symptoms and signs involving the musculoskeletal system   Treatment Diagnosis M54.31  Sciatica, Right Side  M54.32  Sciatica, Left Side  R53.1 Weakness   Date of Evaluation 24    Additional Pertinent History HTN; Pre-DM; anxiety; obesity; OA; SOB      Functional Outcome Measure Used Modified NOEMÍ   Functional Outcome Score 15/50 or 30% disability (24)   13/50 or 26% disability (24)      Insurance: Primary: Payor: [employer] /  /  / ,   Secondary:    Authorization Information: Pre-certification is not needed    Approved Procedure Codes: Authorization of Specific CPT Codes Not Required  (Codes requested indicated by red font, codes approved indicated by black font)   Visit # 7, 1/10 for progress note (Reporting Period:  to     )   Visits Allowed: Based on medical necessity   Recertification Date:    Physician Follow-Up:    Physician Orders:    History of Present Illness: Isamar is referred to address low back pain that has intensified over the last few months with the sciatic pain being most notable. The sciatic pain shoots down either leg, currently left side is effected the most.     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 She does not remember the pain being intensified since the fall.     She denies numbness and describes it more like a burning  importance of abdominal bracing and being consistent with HEP. We will follow up again in 2 weeks.    Body Structures/Functions/Activity Limitations: impaired activity tolerance, impaired endurance, impaired muscle tone, impaired ROM, impaired strength, pain, abnormal gait, and abnormal posture  Prognosis: good    GOALS:  Patient Goal: resolve back pain    Short Term Goals:  Time Frame: 4 weeks   Isamar will demonstrate and maintain neutral spine posturing with all exercises and transfers thereby providing greater internal support to her lumbar spine. GOAL MET  Isamar's radicular symptoms will centralize to lumbar spine with nerve glide with goal of complete resolution. ONGOING-it is improving   Isamar's Modified NOEMÍ will improve to <10 indicating moderate disability related to her back pain.      Long Term Goals:  Time Frame: 8 weeks   Isamar will be discharged from PT with independent HEP to maintain all gains achieved in clinic.  [place] Modified NOEMÍ will improve to <2 indicating minimal to no disability related to her back pain.  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      Patient Education:   []  HEP/Education Completed: Plan of Care, Goals, HEP  [facility] Access Code:  []  No new Education completed  [x]  Reviewed Prior HEP      [x]  Patient verbalized and/or demonstrated understanding of education provided.  []  Patient unable to verbalize and/or demonstrate understanding of education provided.  Will continue education.  [x]  Barriers to learning: n/a    PLAN:  Treatment Recommendations: Strengthening, Range of Motion, Endurance Training, Gait Training, Neuromuscular Re-education, Manual Therapy - Soft Tissue Mobilization, Manual Therapy - Joint Manipulation, Pain Management, Home Exercise Program, Patient Education, Integrative Dry Needling, Aquatics, and Modalities    []  Plan of care initiated.  Plan

## 2024-09-20 ENCOUNTER — HOSPITAL ENCOUNTER (OUTPATIENT)
Dept: PHYSICAL THERAPY | Age: 49
Setting detail: THERAPIES SERIES
Discharge: HOME | End: 2024-09-20

## 2024-10-03 ENCOUNTER — LAB (OUTPATIENT)
Dept: LAB | Age: 49
End: 2024-10-03

## 2024-10-03 LAB
ALT SERPL W/O P-5'-P-CCNC: 15 U/L (ref 11–66)
AST SERPL-CCNC: 17 U/L (ref 5–40)
BASOPHILS ABSOLUTE: 0 THOU/MM3 (ref 0–0.1)
BASOPHILS NFR BLD AUTO: 0.3 %
CREAT SERPL-MCNC: 0.6 MG/DL (ref 0.4–1.2)
DEPRECATED RDW RBC AUTO: 44 FL (ref 35–45)
EOSINOPHIL NFR BLD AUTO: 1.2 %
EOSINOPHILS ABSOLUTE: 0.1 THOU/MM3 (ref 0–0.4)
ERYTHROCYTE [DISTWIDTH] IN BLOOD BY AUTOMATED COUNT: 14.4 % (ref 11.5–14.5)
ERYTHROCYTE [SEDIMENTATION RATE] IN BLOOD BY WESTERGREN METHOD: 19 MM/HR (ref 0–20)
GFR SERPL CREATININE-BSD FRML MDRD: > 90 ML/MIN/1.73M2
HCT VFR BLD AUTO: 39.2 % (ref 37–47)
HGB BLD-MCNC: 12.9 GM/DL (ref 12–16)
IMM GRANULOCYTES # BLD AUTO: 0.04 THOU/MM3 (ref 0–0.07)
IMM GRANULOCYTES NFR BLD AUTO: 0.3 %
LYMPHOCYTES ABSOLUTE: 3.3 THOU/MM3 (ref 1–4.8)
LYMPHOCYTES NFR BLD AUTO: 26.7 %
MCH RBC QN AUTO: 28.2 PG (ref 26–33)
MCHC RBC AUTO-ENTMCNC: 32.9 GM/DL (ref 32.2–35.5)
MCV RBC AUTO: 85.6 FL (ref 81–99)
MONOCYTES ABSOLUTE: 0.6 THOU/MM3 (ref 0.4–1.3)
MONOCYTES NFR BLD AUTO: 5.3 %
NEUTROPHILS ABSOLUTE: 8.1 THOU/MM3 (ref 1.8–7.7)
NEUTROPHILS NFR BLD AUTO: 66.2 %
NRBC BLD AUTO-RTO: 0 /100 WBC
PLATELET # BLD AUTO: 381 THOU/MM3 (ref 130–400)
PMV BLD AUTO: 9.7 FL (ref 9.4–12.4)
RBC # BLD AUTO: 4.58 MILL/MM3 (ref 4.2–5.4)
WBC # BLD AUTO: 12.2 THOU/MM3 (ref 4.8–10.8)

## 2024-10-07 LAB
GAMMA INTERFERON BACKGROUND BLD IA-ACNC: 0.01 IU/ML
M TB IFN-G BLD-IMP: NEGATIVE
M TB IFN-G CD4+ BCKGRND COR BLD-ACNC: 0.04 IU/ML
M TB IFN-G CD4+CD8+ BCKGRND COR BLD-ACNC: 0.05 IU/ML
MITOGEN IGNF BCKGRD COR BLD-ACNC: 9.99 IU/ML

## 2025-04-11 ENCOUNTER — LAB (OUTPATIENT)
Dept: LAB | Age: 50
End: 2025-04-11

## 2025-04-11 LAB
ALT SERPL W/O P-5'-P-CCNC: 15 U/L (ref 10–35)
AST SERPL-CCNC: 18 U/L (ref 10–35)
BASOPHILS ABSOLUTE: 0 THOU/MM3 (ref 0–0.1)
BASOPHILS NFR BLD AUTO: 0.4 %
CREAT SERPL-MCNC: 0.6 MG/DL (ref 0.5–0.9)
DEPRECATED RDW RBC AUTO: 45.1 FL (ref 35–45)
EOSINOPHIL NFR BLD AUTO: 1.7 %
EOSINOPHILS ABSOLUTE: 0.2 THOU/MM3 (ref 0–0.4)
ERYTHROCYTE [DISTWIDTH] IN BLOOD BY AUTOMATED COUNT: 14.8 % (ref 11.5–14.5)
ERYTHROCYTE [SEDIMENTATION RATE] IN BLOOD BY WESTERGREN METHOD: 17 MM/HR (ref 0–20)
GFR SERPL CREATININE-BSD FRML MDRD: > 90 ML/MIN/1.73M2
HCT VFR BLD AUTO: 37.2 % (ref 37–47)
HGB BLD-MCNC: 12 GM/DL (ref 12–16)
IMM GRANULOCYTES # BLD AUTO: 0.02 THOU/MM3 (ref 0–0.07)
IMM GRANULOCYTES NFR BLD AUTO: 0.2 %
LYMPHOCYTES ABSOLUTE: 2.8 THOU/MM3 (ref 1–4.8)
LYMPHOCYTES NFR BLD AUTO: 31.5 %
MCH RBC QN AUTO: 27.4 PG (ref 26–33)
MCHC RBC AUTO-ENTMCNC: 32.3 GM/DL (ref 32.2–35.5)
MCV RBC AUTO: 84.9 FL (ref 81–99)
MONOCYTES ABSOLUTE: 0.5 THOU/MM3 (ref 0.4–1.3)
MONOCYTES NFR BLD AUTO: 5.7 %
NEUTROPHILS ABSOLUTE: 5.4 THOU/MM3 (ref 1.8–7.7)
NEUTROPHILS NFR BLD AUTO: 60.5 %
NRBC BLD AUTO-RTO: 0 /100 WBC
PLATELET # BLD AUTO: 328 THOU/MM3 (ref 130–400)
PMV BLD AUTO: 10.1 FL (ref 9.4–12.4)
RBC # BLD AUTO: 4.38 MILL/MM3 (ref 4.2–5.4)
WBC # BLD AUTO: 8.9 THOU/MM3 (ref 4.8–10.8)

## 2025-05-08 ENCOUNTER — TELEPHONE (OUTPATIENT)
Dept: PULMONOLOGY | Age: 50
End: 2025-05-08

## 2025-05-08 NOTE — TELEPHONE ENCOUNTER
Phoned patient and left message to call office to reschedule appointment due to provider no longer practicing at [facility].

## 2025-06-27 ENCOUNTER — LAB (OUTPATIENT)
Dept: LAB | Age: 50
End: 2025-06-27

## 2025-06-27 LAB
25(OH)D3 SERPL-MCNC: 32 NG/ML (ref 30–100)
ALBUMIN SERPL BCG-MCNC: 4.1 G/DL (ref 3.4–4.9)
ALP SERPL-CCNC: 80 U/L (ref 38–126)
ALT SERPL W/O P-5'-P-CCNC: 16 U/L (ref 10–35)
ANION GAP SERPL CALC-SCNC: 12 MEQ/L (ref 8–16)
AST SERPL-CCNC: 18 U/L (ref 10–35)
BASOPHILS ABSOLUTE: 0 THOU/MM3 (ref 0–0.1)
BASOPHILS NFR BLD AUTO: 0.3 %
BILIRUB SERPL-MCNC: 0.3 MG/DL (ref 0.3–1.2)
BUN SERPL-MCNC: 16 MG/DL (ref 8–23)
CALCIUM SERPL-MCNC: 9.4 MG/DL (ref 8.6–10)
CHLORIDE SERPL-SCNC: 100 MEQ/L (ref 98–111)
CHOLEST SERPL-MCNC: 164 MG/DL (ref 100–199)
CO2 SERPL-SCNC: 26 MEQ/L (ref 22–29)
CREAT SERPL-MCNC: 0.6 MG/DL (ref 0.5–0.9)
CREAT UR-MCNC: 89.4 MG/DL
DEPRECATED MEAN GLUCOSE BLD GHB EST-ACNC: 135 MG/DL (ref 70–126)
DEPRECATED RDW RBC AUTO: 45.6 FL (ref 35–45)
EOSINOPHIL NFR BLD AUTO: 1.8 %
EOSINOPHILS ABSOLUTE: 0.2 THOU/MM3 (ref 0–0.4)
ERYTHROCYTE [DISTWIDTH] IN BLOOD BY AUTOMATED COUNT: 14.5 % (ref 11.5–14.5)
ERYTHROCYTE [SEDIMENTATION RATE] IN BLOOD BY WESTERGREN METHOD: 14 MM/HR (ref 0–20)
GFR SERPL CREATININE-BSD FRML MDRD: > 90 ML/MIN/1.73M2
GLUCOSE SERPL-MCNC: 111 MG/DL (ref 74–109)
HBA1C MFR BLD HPLC: 6.5 % (ref 4–6)
HCT VFR BLD AUTO: 40.4 % (ref 37–47)
HDLC SERPL-MCNC: 32 MG/DL
HGB BLD-MCNC: 12.9 GM/DL (ref 12–16)
IMM GRANULOCYTES # BLD AUTO: 0.03 THOU/MM3 (ref 0–0.07)
IMM GRANULOCYTES NFR BLD AUTO: 0.3 %
LDLC SERPL CALC-MCNC: 100 MG/DL
LYMPHOCYTES ABSOLUTE: 3 THOU/MM3 (ref 1–4.8)
LYMPHOCYTES NFR BLD AUTO: 33 %
MCH RBC QN AUTO: 27.8 PG (ref 26–33)
MCHC RBC AUTO-ENTMCNC: 31.9 GM/DL (ref 32.2–35.5)
MCV RBC AUTO: 87.1 FL (ref 81–99)
MICROALBUMIN UR-MCNC: 18.1 MG/DL
MICROALBUMIN/CREAT RATIO PNL UR: 202 MG/G (ref 0–30)
MONOCYTES ABSOLUTE: 0.6 THOU/MM3 (ref 0.4–1.3)
MONOCYTES NFR BLD AUTO: 6.8 %
NEUTROPHILS ABSOLUTE: 5.2 THOU/MM3 (ref 1.8–7.7)
NEUTROPHILS NFR BLD AUTO: 57.8 %
NRBC BLD AUTO-RTO: 0 /100 WBC
PLATELET # BLD AUTO: 331 THOU/MM3 (ref 130–400)
PMV BLD AUTO: 10.1 FL (ref 9.4–12.4)
POTASSIUM SERPL-SCNC: 4.1 MEQ/L (ref 3.5–5.2)
PROT SERPL-MCNC: 7 G/DL (ref 6.4–8.3)
RBC # BLD AUTO: 4.64 MILL/MM3 (ref 4.2–5.4)
SODIUM SERPL-SCNC: 138 MEQ/L (ref 135–145)
TRIGL SERPL-MCNC: 162 MG/DL (ref 0–199)
TSH SERPL DL<=0.05 MIU/L-ACNC: 1.96 UIU/ML (ref 0.27–4.2)
WBC # BLD AUTO: 9 THOU/MM3 (ref 4.8–10.8)

## 2025-08-05 ENCOUNTER — LAB (OUTPATIENT)
Dept: LAB | Age: 50
End: 2025-08-05

## 2025-08-05 DIAGNOSIS — I10 ESSENTIAL HYPERTENSION: ICD-10-CM

## 2025-08-05 DIAGNOSIS — E11.21 DIABETIC NEPHROPATHY WITH PROTEINURIA (HCC): ICD-10-CM

## 2025-08-05 DIAGNOSIS — R80.0 ISOLATED NON-NEPHROTIC PROTEINURIA: ICD-10-CM

## 2025-08-05 DIAGNOSIS — N18.2 CKD (CHRONIC KIDNEY DISEASE), STAGE II: ICD-10-CM

## 2025-08-05 LAB
ALBUMIN SERPL BCG-MCNC: 4.3 G/DL (ref 3.4–4.9)
ANION GAP SERPL CALC-SCNC: 11 MEQ/L (ref 8–16)
BACTERIA: ABNORMAL
BILIRUB UR QL STRIP: NEGATIVE
BUN SERPL-MCNC: 14 MG/DL (ref 8–23)
CALCIUM SERPL-MCNC: 9.8 MG/DL (ref 8.6–10)
CASTS #/AREA URNS LPF: ABNORMAL /LPF
CASTS #/AREA URNS LPF: ABNORMAL /LPF
CHARACTER UR: CLEAR
CHARCOAL URNS QL MICRO: ABNORMAL
CHLORIDE SERPL-SCNC: 98 MEQ/L (ref 98–111)
CO2 SERPL-SCNC: 29 MEQ/L (ref 22–29)
COLOR UR: YELLOW
CREAT SERPL-MCNC: 0.8 MG/DL (ref 0.5–0.9)
CREAT UR-MCNC: 96.2 MG/DL
CREAT UR-MCNC: 96.2 MG/DL
CRYSTALS URNS QL MICRO: ABNORMAL
EPITHELIAL CELLS, UA: ABNORMAL /HPF
GFR SERPL CREATININE-BSD FRML MDRD: 90 ML/MIN/1.73M2
GLUCOSE SERPL-MCNC: 109 MG/DL (ref 74–109)
GLUCOSE UR QL STRIP.AUTO: NEGATIVE MG/DL
HGB UR QL STRIP.AUTO: NEGATIVE
KETONES UR QL STRIP.AUTO: NEGATIVE
LEUKOCYTE ESTERASE UR QL STRIP.AUTO: NEGATIVE
MICROALBUMIN UR-MCNC: 18.5 MG/DL
MICROALBUMIN/CREAT RATIO PNL UR: 192 MG/G (ref 0–30)
NITRITE UR QL STRIP.AUTO: NEGATIVE
PH UR STRIP.AUTO: 6.5 [PH] (ref 5–9)
PHOSPHATE SERPL-MCNC: 4.2 MG/DL (ref 2.5–4.5)
POTASSIUM SERPL-SCNC: 3.9 MEQ/L (ref 3.5–5.2)
PROT UR STRIP.AUTO-MCNC: 30 MG/DL
PROT UR-MCNC: 35.2 MG/DL
PROT/CREAT 24H UR: 0.37 MG/G{CREAT}
RBC #/AREA URNS HPF: ABNORMAL /HPF
RENAL EPI CELLS #/AREA URNS HPF: ABNORMAL /[HPF]
SODIUM SERPL-SCNC: 138 MEQ/L (ref 135–145)
SPECIFIC GRAVITY UA: 1.02 (ref 1–1.03)
UROBILINOGEN, URINE: 0.2 EU/DL (ref 0–1)
WBC #/AREA URNS HPF: ABNORMAL /HPF
YEAST LIKE FUNGI URNS QL MICRO: ABNORMAL

## 2025-08-21 ENCOUNTER — OFFICE VISIT (OUTPATIENT)
Dept: NEPHROLOGY | Age: 50
End: 2025-08-21
Payer: COMMERCIAL

## 2025-08-21 VITALS
OXYGEN SATURATION: 98 % | WEIGHT: 293 LBS | DIASTOLIC BLOOD PRESSURE: 86 MMHG | HEART RATE: 65 BPM | SYSTOLIC BLOOD PRESSURE: 118 MMHG | BODY MASS INDEX: 50.48 KG/M2

## 2025-08-21 DIAGNOSIS — I10 ESSENTIAL HYPERTENSION: ICD-10-CM

## 2025-08-21 DIAGNOSIS — N18.2 CKD (CHRONIC KIDNEY DISEASE), STAGE II: Primary | ICD-10-CM

## 2025-08-21 DIAGNOSIS — R80.0 ISOLATED NON-NEPHROTIC PROTEINURIA: ICD-10-CM

## 2025-08-21 DIAGNOSIS — E11.21 DIABETIC NEPHROPATHY WITH PROTEINURIA (HCC): ICD-10-CM

## 2025-08-21 PROCEDURE — 2022F DILAT RTA XM EVC RTNOPTHY: CPT | Performed by: INTERNAL MEDICINE

## 2025-08-21 PROCEDURE — 1036F TOBACCO NON-USER: CPT | Performed by: INTERNAL MEDICINE

## 2025-08-21 PROCEDURE — 3079F DIAST BP 80-89 MM HG: CPT | Performed by: INTERNAL MEDICINE

## 2025-08-21 PROCEDURE — 3044F HG A1C LEVEL LT 7.0%: CPT | Performed by: INTERNAL MEDICINE

## 2025-08-21 PROCEDURE — G8427 DOCREV CUR MEDS BY ELIG CLIN: HCPCS | Performed by: INTERNAL MEDICINE

## 2025-08-21 PROCEDURE — G8417 CALC BMI ABV UP PARAM F/U: HCPCS | Performed by: INTERNAL MEDICINE

## 2025-08-21 PROCEDURE — 3074F SYST BP LT 130 MM HG: CPT | Performed by: INTERNAL MEDICINE

## 2025-08-21 PROCEDURE — 3017F COLORECTAL CA SCREEN DOC REV: CPT | Performed by: INTERNAL MEDICINE

## 2025-08-21 PROCEDURE — 99213 OFFICE O/P EST LOW 20 MIN: CPT | Performed by: INTERNAL MEDICINE

## 2025-08-21 RX ORDER — TIRZEPATIDE 2.5 MG/.5ML
INJECTION, SOLUTION SUBCUTANEOUS
COMMUNITY
Start: 2025-07-07

## 2025-08-28 ENCOUNTER — OFFICE VISIT (OUTPATIENT)
Age: 50
End: 2025-08-28
Payer: COMMERCIAL

## 2025-08-28 VITALS
HEART RATE: 72 BPM | WEIGHT: 293 LBS | TEMPERATURE: 98 F | SYSTOLIC BLOOD PRESSURE: 130 MMHG | OXYGEN SATURATION: 97 % | DIASTOLIC BLOOD PRESSURE: 82 MMHG | BODY MASS INDEX: 44.41 KG/M2 | HEIGHT: 68 IN

## 2025-08-28 DIAGNOSIS — G47.33 OSA ON CPAP: Primary | ICD-10-CM

## 2025-08-28 DIAGNOSIS — L40.50 PSORIATIC ARTHRITIS (HCC): ICD-10-CM

## 2025-08-28 PROCEDURE — 3017F COLORECTAL CA SCREEN DOC REV: CPT | Performed by: NURSE PRACTITIONER

## 2025-08-28 PROCEDURE — 99214 OFFICE O/P EST MOD 30 MIN: CPT | Performed by: NURSE PRACTITIONER

## 2025-08-28 PROCEDURE — G8427 DOCREV CUR MEDS BY ELIG CLIN: HCPCS | Performed by: NURSE PRACTITIONER

## 2025-08-28 PROCEDURE — 3075F SYST BP GE 130 - 139MM HG: CPT | Performed by: NURSE PRACTITIONER

## 2025-08-28 PROCEDURE — 1036F TOBACCO NON-USER: CPT | Performed by: NURSE PRACTITIONER

## 2025-08-28 PROCEDURE — 3079F DIAST BP 80-89 MM HG: CPT | Performed by: NURSE PRACTITIONER

## 2025-08-28 PROCEDURE — G8417 CALC BMI ABV UP PARAM F/U: HCPCS | Performed by: NURSE PRACTITIONER

## 2025-08-28 RX ORDER — POTASSIUM CHLORIDE 1500 MG/1
20 TABLET, EXTENDED RELEASE ORAL DAILY
COMMUNITY
Start: 2025-07-07

## 2025-08-28 RX ORDER — NYSTATIN 100000 [USP'U]/G
POWDER TOPICAL
COMMUNITY
Start: 2025-07-08

## 2025-08-28 RX ORDER — HYDROXYZINE HYDROCHLORIDE 25 MG/1
TABLET, FILM COATED ORAL
COMMUNITY

## 2025-09-04 ENCOUNTER — LAB (OUTPATIENT)
Dept: LAB | Age: 50
End: 2025-09-04

## 2025-09-05 LAB — HOMOCYSTEINE: 7.1 UMOL/L (ref 0–15)

## (undated) DEVICE — GLOVE ORTHO 8   MSG9480